# Patient Record
Sex: MALE | Race: WHITE | Employment: OTHER | URBAN - NONMETROPOLITAN AREA
[De-identification: names, ages, dates, MRNs, and addresses within clinical notes are randomized per-mention and may not be internally consistent; named-entity substitution may affect disease eponyms.]

---

## 2017-01-03 RX ORDER — HYDROCHLOROTHIAZIDE 25 MG/1
TABLET ORAL
Qty: 30 TABLET | Refills: 4 | Status: SHIPPED | OUTPATIENT
Start: 2017-01-03 | End: 2017-11-01 | Stop reason: SDUPTHER

## 2017-01-18 ENCOUNTER — OFFICE VISIT (OUTPATIENT)
Dept: FAMILY MEDICINE CLINIC | Age: 62
End: 2017-01-18

## 2017-01-18 VITALS
HEART RATE: 68 BPM | HEIGHT: 70 IN | SYSTOLIC BLOOD PRESSURE: 122 MMHG | DIASTOLIC BLOOD PRESSURE: 70 MMHG | WEIGHT: 192 LBS | BODY MASS INDEX: 27.49 KG/M2 | OXYGEN SATURATION: 98 %

## 2017-01-18 DIAGNOSIS — I10 ESSENTIAL HYPERTENSION: Primary | ICD-10-CM

## 2017-01-18 PROCEDURE — G8482 FLU IMMUNIZE ORDER/ADMIN: HCPCS | Performed by: NURSE PRACTITIONER

## 2017-01-18 PROCEDURE — G8419 CALC BMI OUT NRM PARAM NOF/U: HCPCS | Performed by: NURSE PRACTITIONER

## 2017-01-18 PROCEDURE — G8427 DOCREV CUR MEDS BY ELIG CLIN: HCPCS | Performed by: NURSE PRACTITIONER

## 2017-01-18 PROCEDURE — 99214 OFFICE O/P EST MOD 30 MIN: CPT | Performed by: NURSE PRACTITIONER

## 2017-01-18 PROCEDURE — 3017F COLORECTAL CA SCREEN DOC REV: CPT | Performed by: NURSE PRACTITIONER

## 2017-01-18 PROCEDURE — 4004F PT TOBACCO SCREEN RCVD TLK: CPT | Performed by: NURSE PRACTITIONER

## 2017-01-18 ASSESSMENT — ENCOUNTER SYMPTOMS
RESPIRATORY NEGATIVE: 1
EYES NEGATIVE: 1
ALLERGIC/IMMUNOLOGIC NEGATIVE: 1

## 2017-03-27 ENCOUNTER — OFFICE VISIT (OUTPATIENT)
Dept: FAMILY MEDICINE CLINIC | Age: 62
End: 2017-03-27

## 2017-03-27 VITALS
BODY MASS INDEX: 26.45 KG/M2 | SYSTOLIC BLOOD PRESSURE: 126 MMHG | DIASTOLIC BLOOD PRESSURE: 84 MMHG | HEIGHT: 70 IN | WEIGHT: 184.8 LBS | TEMPERATURE: 98.6 F | OXYGEN SATURATION: 95 % | HEART RATE: 96 BPM

## 2017-03-27 DIAGNOSIS — J40 BRONCHITIS: Primary | ICD-10-CM

## 2017-03-27 PROCEDURE — 99214 OFFICE O/P EST MOD 30 MIN: CPT | Performed by: FAMILY MEDICINE

## 2017-03-27 RX ORDER — CEFDINIR 300 MG/1
300 CAPSULE ORAL 2 TIMES DAILY
Qty: 20 CAPSULE | Refills: 0 | Status: SHIPPED | OUTPATIENT
Start: 2017-03-27 | End: 2017-04-06

## 2017-03-27 RX ORDER — DEXTROMETHORPHAN HYDROBROMIDE AND PROMETHAZINE HYDROCHLORIDE 15; 6.25 MG/5ML; MG/5ML
5 SYRUP ORAL 4 TIMES DAILY PRN
Qty: 120 ML | Refills: 0 | Status: SHIPPED | OUTPATIENT
Start: 2017-03-27 | End: 2017-04-03

## 2017-03-27 ASSESSMENT — ENCOUNTER SYMPTOMS
SHORTNESS OF BREATH: 1
VOMITING: 0
DIARRHEA: 0
NAUSEA: 0
COUGH: 1
CONSTIPATION: 0

## 2017-03-31 DIAGNOSIS — I10 ESSENTIAL HYPERTENSION: ICD-10-CM

## 2017-03-31 RX ORDER — FLUOXETINE HYDROCHLORIDE 40 MG/1
CAPSULE ORAL
Qty: 60 CAPSULE | Refills: 5 | Status: SHIPPED | OUTPATIENT
Start: 2017-03-31 | End: 2017-09-27 | Stop reason: SDUPTHER

## 2017-03-31 RX ORDER — LISINOPRIL 10 MG/1
TABLET ORAL
Qty: 30 TABLET | Refills: 3 | Status: SHIPPED | OUTPATIENT
Start: 2017-03-31 | End: 2017-07-31 | Stop reason: SDUPTHER

## 2017-04-18 ENCOUNTER — OFFICE VISIT (OUTPATIENT)
Dept: FAMILY MEDICINE CLINIC | Age: 62
End: 2017-04-18

## 2017-04-18 VITALS
SYSTOLIC BLOOD PRESSURE: 102 MMHG | DIASTOLIC BLOOD PRESSURE: 68 MMHG | HEIGHT: 70 IN | BODY MASS INDEX: 27.2 KG/M2 | OXYGEN SATURATION: 96 % | WEIGHT: 190 LBS | HEART RATE: 89 BPM

## 2017-04-18 DIAGNOSIS — R10.30 LOWER ABDOMINAL PAIN: ICD-10-CM

## 2017-04-18 DIAGNOSIS — R30.0 DYSURIA: Primary | ICD-10-CM

## 2017-04-18 DIAGNOSIS — N41.0 ACUTE PROSTATITIS: ICD-10-CM

## 2017-04-18 LAB
BILIRUBIN, POC: NEGATIVE
BLOOD URINE, POC: NEGATIVE
CLARITY, POC: CLEAR
COLOR, POC: YELLOW
GLUCOSE URINE, POC: NEGATIVE
KETONES, POC: NORMAL
LEUKOCYTE EST, POC: NEGATIVE
NITRITE, POC: NEGATIVE
PH, POC: 6
PROTEIN, POC: NORMAL
SPECIFIC GRAVITY, POC: 1.02
UROBILINOGEN, POC: 0.2

## 2017-04-18 PROCEDURE — 81002 URINALYSIS NONAUTO W/O SCOPE: CPT | Performed by: FAMILY MEDICINE

## 2017-04-18 PROCEDURE — 99214 OFFICE O/P EST MOD 30 MIN: CPT | Performed by: FAMILY MEDICINE

## 2017-04-18 RX ORDER — CIPROFLOXACIN 500 MG/1
500 TABLET, FILM COATED ORAL 2 TIMES DAILY
Qty: 28 TABLET | Refills: 0 | Status: SHIPPED | OUTPATIENT
Start: 2017-04-18 | End: 2017-05-02

## 2017-04-18 RX ORDER — IBUPROFEN 800 MG/1
800 TABLET ORAL EVERY 8 HOURS PRN
Qty: 60 TABLET | Refills: 0 | Status: SHIPPED | OUTPATIENT
Start: 2017-04-18 | End: 2019-06-19

## 2017-04-18 ASSESSMENT — ENCOUNTER SYMPTOMS
ABDOMINAL PAIN: 1
NAUSEA: 1

## 2017-04-20 LAB — URINE CULTURE, ROUTINE: NORMAL

## 2017-04-27 RX ORDER — SIMVASTATIN 10 MG
TABLET ORAL
Qty: 30 TABLET | Refills: 5 | Status: SHIPPED | OUTPATIENT
Start: 2017-04-27 | End: 2017-08-30 | Stop reason: SDUPTHER

## 2017-05-23 ENCOUNTER — OFFICE VISIT (OUTPATIENT)
Dept: FAMILY MEDICINE CLINIC | Age: 62
End: 2017-05-23

## 2017-05-23 VITALS
BODY MASS INDEX: 27.35 KG/M2 | OXYGEN SATURATION: 98 % | HEART RATE: 87 BPM | DIASTOLIC BLOOD PRESSURE: 68 MMHG | HEIGHT: 70 IN | SYSTOLIC BLOOD PRESSURE: 102 MMHG | WEIGHT: 191 LBS

## 2017-05-23 DIAGNOSIS — R59.0 AXILLARY ADENOPATHY: Primary | ICD-10-CM

## 2017-05-23 PROCEDURE — 99213 OFFICE O/P EST LOW 20 MIN: CPT | Performed by: NURSE PRACTITIONER

## 2017-05-23 ASSESSMENT — ENCOUNTER SYMPTOMS: RESPIRATORY NEGATIVE: 1

## 2017-06-13 RX ORDER — METOPROLOL SUCCINATE 25 MG/1
TABLET, EXTENDED RELEASE ORAL
Qty: 30 TABLET | Refills: 5 | Status: SHIPPED | OUTPATIENT
Start: 2017-06-13 | End: 2018-06-28 | Stop reason: SDUPTHER

## 2017-06-14 RX ORDER — METOPROLOL SUCCINATE 25 MG/1
TABLET, EXTENDED RELEASE ORAL
Qty: 30 TABLET | Refills: 5 | Status: SHIPPED | OUTPATIENT
Start: 2017-06-14 | End: 2017-08-30 | Stop reason: SDUPTHER

## 2017-07-31 DIAGNOSIS — I10 ESSENTIAL HYPERTENSION: ICD-10-CM

## 2017-07-31 RX ORDER — LISINOPRIL 10 MG/1
TABLET ORAL
Qty: 30 TABLET | Refills: 3 | Status: SHIPPED | OUTPATIENT
Start: 2017-07-31 | End: 2017-11-29 | Stop reason: SDUPTHER

## 2017-08-25 RX ORDER — SIMVASTATIN 10 MG
TABLET ORAL
Qty: 90 TABLET | Refills: 3 | Status: SHIPPED | OUTPATIENT
Start: 2017-08-25 | End: 2018-10-29 | Stop reason: DRUGHIGH

## 2017-08-30 ENCOUNTER — OFFICE VISIT (OUTPATIENT)
Dept: FAMILY MEDICINE CLINIC | Age: 62
End: 2017-08-30

## 2017-08-30 VITALS
SYSTOLIC BLOOD PRESSURE: 124 MMHG | HEIGHT: 70 IN | HEART RATE: 81 BPM | DIASTOLIC BLOOD PRESSURE: 86 MMHG | BODY MASS INDEX: 27.06 KG/M2 | WEIGHT: 189 LBS | TEMPERATURE: 98.3 F | OXYGEN SATURATION: 97 %

## 2017-08-30 DIAGNOSIS — R73.09 ELEVATED GLUCOSE: ICD-10-CM

## 2017-08-30 DIAGNOSIS — Z11.4 SCREENING FOR HIV WITHOUT PRESENCE OF RISK FACTORS: ICD-10-CM

## 2017-08-30 DIAGNOSIS — E78.2 MIXED HYPERLIPIDEMIA: ICD-10-CM

## 2017-08-30 DIAGNOSIS — Z23 NEEDS FLU SHOT: ICD-10-CM

## 2017-08-30 DIAGNOSIS — R55 NEAR SYNCOPE: Primary | ICD-10-CM

## 2017-08-30 DIAGNOSIS — I10 ESSENTIAL HYPERTENSION: ICD-10-CM

## 2017-08-30 PROCEDURE — 90471 IMMUNIZATION ADMIN: CPT | Performed by: NURSE PRACTITIONER

## 2017-08-30 PROCEDURE — 90688 IIV4 VACCINE SPLT 0.5 ML IM: CPT | Performed by: NURSE PRACTITIONER

## 2017-08-30 PROCEDURE — 99213 OFFICE O/P EST LOW 20 MIN: CPT | Performed by: NURSE PRACTITIONER

## 2017-08-30 ASSESSMENT — ENCOUNTER SYMPTOMS
RESPIRATORY NEGATIVE: 1
ALLERGIC/IMMUNOLOGIC NEGATIVE: 1
EYES NEGATIVE: 1
GASTROINTESTINAL NEGATIVE: 1

## 2017-08-31 ENCOUNTER — NURSE ONLY (OUTPATIENT)
Dept: FAMILY MEDICINE CLINIC | Age: 62
End: 2017-08-31

## 2017-08-31 DIAGNOSIS — I10 ESSENTIAL HYPERTENSION: ICD-10-CM

## 2017-08-31 DIAGNOSIS — E78.2 MIXED HYPERLIPIDEMIA: ICD-10-CM

## 2017-08-31 DIAGNOSIS — R73.09 ELEVATED GLUCOSE: ICD-10-CM

## 2017-08-31 DIAGNOSIS — Z11.4 SCREENING FOR HIV WITHOUT PRESENCE OF RISK FACTORS: ICD-10-CM

## 2017-08-31 LAB
A/G RATIO: 1.6 (ref 1.1–2.2)
ALBUMIN SERPL-MCNC: 4.6 G/DL (ref 3.4–5)
ALP BLD-CCNC: 46 U/L (ref 40–129)
ALT SERPL-CCNC: 49 U/L (ref 10–40)
ANION GAP SERPL CALCULATED.3IONS-SCNC: 17 MMOL/L (ref 3–16)
AST SERPL-CCNC: 50 U/L (ref 15–37)
BILIRUB SERPL-MCNC: 0.8 MG/DL (ref 0–1)
BUN BLDV-MCNC: 17 MG/DL (ref 7–20)
CALCIUM SERPL-MCNC: 9.5 MG/DL (ref 8.3–10.6)
CHLORIDE BLD-SCNC: 98 MMOL/L (ref 99–110)
CHOLESTEROL, TOTAL: 226 MG/DL (ref 0–199)
CO2: 23 MMOL/L (ref 21–32)
CREAT SERPL-MCNC: 0.8 MG/DL (ref 0.8–1.3)
GFR AFRICAN AMERICAN: >60
GFR NON-AFRICAN AMERICAN: >60
GLOBULIN: 2.9 G/DL
GLUCOSE BLD-MCNC: 101 MG/DL (ref 70–99)
HDLC SERPL-MCNC: 50 MG/DL (ref 40–60)
LDL CHOLESTEROL CALCULATED: 134 MG/DL
POTASSIUM SERPL-SCNC: 4.5 MMOL/L (ref 3.5–5.1)
SODIUM BLD-SCNC: 138 MMOL/L (ref 136–145)
TOTAL PROTEIN: 7.5 G/DL (ref 6.4–8.2)
TRIGL SERPL-MCNC: 210 MG/DL (ref 0–150)
VLDLC SERPL CALC-MCNC: 42 MG/DL

## 2017-08-31 PROCEDURE — 36415 COLL VENOUS BLD VENIPUNCTURE: CPT | Performed by: NURSE PRACTITIONER

## 2017-09-01 LAB
ESTIMATED AVERAGE GLUCOSE: 102.5 MG/DL
HBA1C MFR BLD: 5.2 %
HIV-1 AND HIV-2 ANTIBODIES: NORMAL

## 2017-09-27 RX ORDER — FLUOXETINE HYDROCHLORIDE 40 MG/1
CAPSULE ORAL
Qty: 60 CAPSULE | Refills: 5 | Status: SHIPPED | OUTPATIENT
Start: 2017-09-27 | End: 2018-04-04 | Stop reason: SDUPTHER

## 2017-11-01 RX ORDER — HYDROCHLOROTHIAZIDE 25 MG/1
TABLET ORAL
Qty: 30 TABLET | Refills: 4 | Status: SHIPPED | OUTPATIENT
Start: 2017-11-01 | End: 2018-08-27 | Stop reason: SDUPTHER

## 2018-04-04 RX ORDER — FLUOXETINE HYDROCHLORIDE 40 MG/1
CAPSULE ORAL
Qty: 60 CAPSULE | Refills: 5 | Status: SHIPPED | OUTPATIENT
Start: 2018-04-04 | End: 2018-06-26 | Stop reason: SDUPTHER

## 2018-05-03 ENCOUNTER — OFFICE VISIT (OUTPATIENT)
Dept: FAMILY MEDICINE CLINIC | Age: 63
End: 2018-05-03

## 2018-05-03 VITALS
HEIGHT: 70 IN | OXYGEN SATURATION: 94 % | DIASTOLIC BLOOD PRESSURE: 74 MMHG | WEIGHT: 192 LBS | BODY MASS INDEX: 27.49 KG/M2 | HEART RATE: 90 BPM | TEMPERATURE: 98.1 F | SYSTOLIC BLOOD PRESSURE: 126 MMHG

## 2018-05-03 DIAGNOSIS — R68.89 FLU-LIKE SYMPTOMS: ICD-10-CM

## 2018-05-03 DIAGNOSIS — J18.9 PNEUMONIA OF LEFT LOWER LOBE DUE TO INFECTIOUS ORGANISM: Primary | ICD-10-CM

## 2018-05-03 LAB
INFLUENZA A ANTIGEN, POC: NORMAL
INFLUENZA B ANTIGEN, POC: NORMAL

## 2018-05-03 PROCEDURE — 99213 OFFICE O/P EST LOW 20 MIN: CPT | Performed by: NURSE PRACTITIONER

## 2018-05-03 PROCEDURE — 87804 INFLUENZA ASSAY W/OPTIC: CPT | Performed by: NURSE PRACTITIONER

## 2018-05-03 RX ORDER — ALBUTEROL SULFATE 90 UG/1
2 AEROSOL, METERED RESPIRATORY (INHALATION) EVERY 6 HOURS PRN
Qty: 1 INHALER | Refills: 3 | Status: SHIPPED | OUTPATIENT
Start: 2018-05-03 | End: 2019-06-19

## 2018-05-03 RX ORDER — LEVOFLOXACIN 500 MG/1
500 TABLET, FILM COATED ORAL DAILY
Qty: 5 TABLET | Refills: 0 | Status: SHIPPED | OUTPATIENT
Start: 2018-05-03 | End: 2018-05-08

## 2018-05-03 ASSESSMENT — ENCOUNTER SYMPTOMS
NAUSEA: 1
EYES NEGATIVE: 1
VOMITING: 0
SHORTNESS OF BREATH: 1
DIARRHEA: 0
WHEEZING: 1
COUGH: 1

## 2018-05-27 DIAGNOSIS — I10 ESSENTIAL HYPERTENSION: ICD-10-CM

## 2018-05-29 RX ORDER — LISINOPRIL 10 MG/1
TABLET ORAL
Qty: 30 TABLET | Refills: 5 | Status: SHIPPED | OUTPATIENT
Start: 2018-05-29 | End: 2018-11-28 | Stop reason: SDUPTHER

## 2018-06-20 ENCOUNTER — TELEPHONE (OUTPATIENT)
Dept: FAMILY MEDICINE CLINIC | Age: 63
End: 2018-06-20

## 2018-06-21 RX ORDER — POTASSIUM CHLORIDE 750 MG/1
10 TABLET, FILM COATED, EXTENDED RELEASE ORAL DAILY
Qty: 90 TABLET | Refills: 3 | Status: SHIPPED | OUTPATIENT
Start: 2018-06-21 | End: 2019-07-23 | Stop reason: SDUPTHER

## 2018-06-28 RX ORDER — METOPROLOL SUCCINATE 25 MG/1
TABLET, EXTENDED RELEASE ORAL
Qty: 30 TABLET | Refills: 5 | Status: SHIPPED | OUTPATIENT
Start: 2018-06-28 | End: 2019-01-15 | Stop reason: SDUPTHER

## 2018-07-26 RX ORDER — SIMVASTATIN 10 MG
TABLET ORAL
Qty: 30 TABLET | Refills: 0 | Status: SHIPPED | OUTPATIENT
Start: 2018-07-26 | End: 2018-08-31 | Stop reason: SDUPTHER

## 2018-08-14 ENCOUNTER — OFFICE VISIT (OUTPATIENT)
Dept: FAMILY MEDICINE CLINIC | Age: 63
End: 2018-08-14

## 2018-08-14 VITALS
DIASTOLIC BLOOD PRESSURE: 96 MMHG | SYSTOLIC BLOOD PRESSURE: 156 MMHG | OXYGEN SATURATION: 98 % | HEART RATE: 93 BPM | WEIGHT: 196.2 LBS | BODY MASS INDEX: 28.15 KG/M2

## 2018-08-14 DIAGNOSIS — R53.83 FATIGUE, UNSPECIFIED TYPE: Primary | ICD-10-CM

## 2018-08-14 DIAGNOSIS — R00.2 PALPITATIONS: ICD-10-CM

## 2018-08-14 DIAGNOSIS — R42 DIZZINESS: ICD-10-CM

## 2018-08-14 PROCEDURE — 3017F COLORECTAL CA SCREEN DOC REV: CPT | Performed by: NURSE PRACTITIONER

## 2018-08-14 PROCEDURE — 99213 OFFICE O/P EST LOW 20 MIN: CPT | Performed by: NURSE PRACTITIONER

## 2018-08-14 PROCEDURE — G8419 CALC BMI OUT NRM PARAM NOF/U: HCPCS | Performed by: NURSE PRACTITIONER

## 2018-08-14 PROCEDURE — 93000 ELECTROCARDIOGRAM COMPLETE: CPT | Performed by: NURSE PRACTITIONER

## 2018-08-14 PROCEDURE — 4004F PT TOBACCO SCREEN RCVD TLK: CPT | Performed by: NURSE PRACTITIONER

## 2018-08-14 PROCEDURE — G8427 DOCREV CUR MEDS BY ELIG CLIN: HCPCS | Performed by: NURSE PRACTITIONER

## 2018-08-14 PROCEDURE — 36415 COLL VENOUS BLD VENIPUNCTURE: CPT | Performed by: NURSE PRACTITIONER

## 2018-08-14 ASSESSMENT — ENCOUNTER SYMPTOMS
RESPIRATORY NEGATIVE: 1
EYES NEGATIVE: 1
GASTROINTESTINAL NEGATIVE: 1

## 2018-08-14 ASSESSMENT — PATIENT HEALTH QUESTIONNAIRE - PHQ9
SUM OF ALL RESPONSES TO PHQ QUESTIONS 1-9: 0
2. FEELING DOWN, DEPRESSED OR HOPELESS: 0
1. LITTLE INTEREST OR PLEASURE IN DOING THINGS: 0
SUM OF ALL RESPONSES TO PHQ QUESTIONS 1-9: 0
SUM OF ALL RESPONSES TO PHQ9 QUESTIONS 1 & 2: 0

## 2018-08-14 NOTE — PROGRESS NOTES
normal.   Neck: Neck supple. Cardiovascular: Normal rate, regular rhythm, S1 normal, S2 normal, normal heart sounds, intact distal pulses and normal pulses. Exam reveals no gallop. No murmur heard. Pulmonary/Chest: Effort normal and breath sounds normal. No accessory muscle usage. No respiratory distress. Abdominal: Soft. Bowel sounds are normal.   Neurological: He is alert and oriented to person, place, and time. Skin: Skin is warm and dry. No rash noted. Psychiatric: He has a normal mood and affect. His speech is normal and behavior is normal.       Assessment/Plan:   1. Fatigue, unspecified type  Patient presents today with c/o dizziness, fatigue, palpitations and intermittent chest pain over the past 6 months. Patient also reports his blood has fluctuated. Blood pressure is elevated today however patient/spouse reports he has been out of metoprolol for 4-5 days. Patient also reports he just took morning medications just prior to visit. Patient with no history of heart disease however strong family history of heart disease in first degree family member <48years of age. EKG shows NSR today. Discussed other possible causes including CAD. Recommend labs as below and patient f/u with cardiology. I also advised patient that should he develop any further chest pain, I would recommend patient seek emergent care. Patient/spouse verbalized understanding. Also see patient instructions. - Comprehensive Metabolic Panel  - TSH with Reflex  - CBC Auto Differential  - Vitamin B12  - Nirali Paul MD    2. Palpitations  See note above. - EKG 12 Lead  - Nirali Paul MD    3.  Dizziness  - Comprehensive Metabolic Panel  - EKG 12 Lead  - Nirali Paul MD

## 2018-08-14 NOTE — PATIENT INSTRUCTIONS
dizziness. Some people feel dizzy when they have migraine headaches. Sometimes bouts of flu can make you feel dizzy. Some medical conditions, such as heart problems or high blood pressure, can make you feel dizzy. Many medicines can cause dizziness, including medicines for high blood pressure, pain, or anxiety. If a medicine causes your symptoms, your doctor may recommend that you stop or change the medicine. If it is a problem with your heart, you may need medicine to help your heart work better. If there is no clear reason for your symptoms, your doctor may suggest watching and waiting for a while to see if the dizziness goes away on its own. Follow-up care is a key part of your treatment and safety. Be sure to make and go to all appointments, and call your doctor if you are having problems. It's also a good idea to know your test results and keep a list of the medicines you take. How can you care for yourself at home? · If your doctor recommends or prescribes medicine, take it exactly as directed. Call your doctor if you think you are having a problem with your medicine. · Do not drive while you feel dizzy. · Try to prevent falls. Steps you can take include:  ¨ Using nonskid mats, adding grab bars near the tub, and using night-lights. ¨ Clearing your home so that walkways are free of anything you might trip on. ¨ Letting family and friends know that you have been feeling dizzy. This will help them know how to help you. When should you call for help? Call 911 anytime you think you may need emergency care. For example, call if:    · You passed out (lost consciousness).     · You have dizziness along with symptoms of a heart attack. These may include:  ¨ Chest pain or pressure, or a strange feeling in the chest.  ¨ Sweating. ¨ Shortness of breath. ¨ Nausea or vomiting. ¨ Pain, pressure, or a strange feeling in the back, neck, jaw, or upper belly or in one or both shoulders or arms.   ¨ Lightheadedness or sudden weakness. ¨ A fast or irregular heartbeat.     · You have symptoms of a stroke. These may include:  ¨ Sudden numbness, tingling, weakness, or loss of movement in your face, arm, or leg, especially on only one side of your body. ¨ Sudden vision changes. ¨ Sudden trouble speaking. ¨ Sudden confusion or trouble understanding simple statements. ¨ Sudden problems with walking or balance. ¨ A sudden, severe headache that is different from past headaches.    Call your doctor now or seek immediate medical care if:    · You feel dizzy and have a fever, headache, or ringing in your ears.     · You have new or increased nausea and vomiting.     · Your dizziness does not go away or comes back.    Watch closely for changes in your health, and be sure to contact your doctor if:    · You do not get better as expected. Where can you learn more? Go to https://[email or web address].[email or web address]. org and sign in to your [employer] account. Enter A250 in the Thename.is box to learn more about \"Dizziness: Care Instructions. \"     If you do not have an account, please click on the \"Sign Up Now\" link. Current as of: November 20, 2017  Content Version: 11.7  © 3265-6230 [employer]. Care instructions adapted under license by [facility] ([facility]). If you have questions about a medical condition or this instruction, always ask your healthcare professional. [address] any warranty or liability for your use of this information.

## 2018-08-15 LAB
A/G RATIO: 1.9 (ref 1.1–2.2)
ALBUMIN SERPL-MCNC: 4.7 G/DL (ref 3.4–5)
ALP BLD-CCNC: 45 U/L (ref 40–129)
ALT SERPL-CCNC: 42 U/L (ref 10–40)
ANION GAP SERPL CALCULATED.3IONS-SCNC: 15 MMOL/L (ref 3–16)
AST SERPL-CCNC: 44 U/L (ref 15–37)
BASOPHILS ABSOLUTE: 0 K/UL (ref 0–0.2)
BASOPHILS RELATIVE PERCENT: 0.5 %
BILIRUB SERPL-MCNC: 0.5 MG/DL (ref 0–1)
BUN BLDV-MCNC: 15 MG/DL (ref 7–20)
CALCIUM SERPL-MCNC: 9.5 MG/DL (ref 8.3–10.6)
CHLORIDE BLD-SCNC: 100 MMOL/L (ref 99–110)
CO2: 23 MMOL/L (ref 21–32)
CREAT SERPL-MCNC: 0.8 MG/DL (ref 0.8–1.3)
EOSINOPHILS ABSOLUTE: 0.2 K/UL (ref 0–0.6)
EOSINOPHILS RELATIVE PERCENT: 4.8 %
GFR AFRICAN AMERICAN: >60
GFR NON-AFRICAN AMERICAN: >60
GLOBULIN: 2.5 G/DL
GLUCOSE BLD-MCNC: 100 MG/DL (ref 70–99)
HCT VFR BLD CALC: 39.8 % (ref 40.5–52.5)
HEMOGLOBIN: 13.4 G/DL (ref 13.5–17.5)
LYMPHOCYTES ABSOLUTE: 1.2 K/UL (ref 1–5.1)
LYMPHOCYTES RELATIVE PERCENT: 32.5 %
MCH RBC QN AUTO: 35 PG (ref 26–34)
MCHC RBC AUTO-ENTMCNC: 33.7 G/DL (ref 31–36)
MCV RBC AUTO: 104 FL (ref 80–100)
MONOCYTES ABSOLUTE: 0.4 K/UL (ref 0–1.3)
MONOCYTES RELATIVE PERCENT: 11.2 %
NEUTROPHILS ABSOLUTE: 1.9 K/UL (ref 1.7–7.7)
NEUTROPHILS RELATIVE PERCENT: 51 %
PDW BLD-RTO: 13.4 % (ref 12.4–15.4)
PLATELET # BLD: 200 K/UL (ref 135–450)
PLATELET SLIDE REVIEW: ADEQUATE
PMV BLD AUTO: 8.8 FL (ref 5–10.5)
POTASSIUM SERPL-SCNC: 4.1 MMOL/L (ref 3.5–5.1)
RBC # BLD: 3.83 M/UL (ref 4.2–5.9)
RBC # BLD: NORMAL 10*6/UL
SLIDE REVIEW: ABNORMAL
SODIUM BLD-SCNC: 138 MMOL/L (ref 136–145)
TOTAL PROTEIN: 7.2 G/DL (ref 6.4–8.2)
TSH REFLEX: 1.7 UIU/ML (ref 0.27–4.2)
VITAMIN B-12: 333 PG/ML (ref 211–911)
WBC # BLD: 3.8 K/UL (ref 4–11)

## 2018-08-20 ENCOUNTER — TELEPHONE (OUTPATIENT)
Dept: CARDIOLOGY CLINIC | Age: 63
End: 2018-08-20

## 2018-08-20 NOTE — TELEPHONE ENCOUNTER
----- Message from Juan Gupta MD sent at 8/20/2018  8:27 AM EDT -----  Normal EKG upon my review. They want referral and please make sure he will have appt. Thanks.

## 2018-08-28 RX ORDER — HYDROCHLOROTHIAZIDE 25 MG/1
TABLET ORAL
Qty: 30 TABLET | Refills: 4 | Status: SHIPPED | OUTPATIENT
Start: 2018-08-28 | End: 2019-01-15 | Stop reason: SDUPTHER

## 2018-08-31 RX ORDER — SIMVASTATIN 10 MG
TABLET ORAL
Qty: 30 TABLET | Refills: 5 | Status: SHIPPED | OUTPATIENT
Start: 2018-08-31 | End: 2018-10-01 | Stop reason: SDUPTHER

## 2018-09-27 PROBLEM — R53.82 CHRONIC FATIGUE: Status: ACTIVE | Noted: 2018-09-27

## 2018-09-27 PROBLEM — R42 DIZZINESS: Status: ACTIVE | Noted: 2018-09-27

## 2018-10-01 ENCOUNTER — OFFICE VISIT (OUTPATIENT)
Dept: CARDIOLOGY CLINIC | Age: 63
End: 2018-10-01
Payer: MEDICARE

## 2018-10-01 VITALS
WEIGHT: 197 LBS | OXYGEN SATURATION: 96 % | HEART RATE: 74 BPM | HEIGHT: 70 IN | SYSTOLIC BLOOD PRESSURE: 164 MMHG | DIASTOLIC BLOOD PRESSURE: 104 MMHG | BODY MASS INDEX: 28.2 KG/M2

## 2018-10-01 DIAGNOSIS — R07.2 PRECORDIAL PAIN: ICD-10-CM

## 2018-10-01 DIAGNOSIS — R42 DIZZINESS: ICD-10-CM

## 2018-10-01 DIAGNOSIS — R00.2 PALPITATIONS: Primary | ICD-10-CM

## 2018-10-01 DIAGNOSIS — I10 ESSENTIAL HYPERTENSION: ICD-10-CM

## 2018-10-01 DIAGNOSIS — E78.2 MIXED HYPERLIPIDEMIA: ICD-10-CM

## 2018-10-01 PROCEDURE — G8419 CALC BMI OUT NRM PARAM NOF/U: HCPCS | Performed by: INTERNAL MEDICINE

## 2018-10-01 PROCEDURE — 3017F COLORECTAL CA SCREEN DOC REV: CPT | Performed by: INTERNAL MEDICINE

## 2018-10-01 PROCEDURE — G8484 FLU IMMUNIZE NO ADMIN: HCPCS | Performed by: INTERNAL MEDICINE

## 2018-10-01 PROCEDURE — 99204 OFFICE O/P NEW MOD 45 MIN: CPT | Performed by: INTERNAL MEDICINE

## 2018-10-01 PROCEDURE — G8427 DOCREV CUR MEDS BY ELIG CLIN: HCPCS | Performed by: INTERNAL MEDICINE

## 2018-10-01 RX ORDER — SILDENAFIL CITRATE 20 MG/1
20 TABLET ORAL PRN
COMMUNITY
End: 2019-09-20

## 2018-10-01 NOTE — PROGRESS NOTES
[address]   Cardiac Consultation    Referring Provider:  SATISH Thakur CNP     Chief Complaint   Patient presents with   [facility] Cardiologist     referred by PCP, ekg 08/14/2018    Palpitations    Dizziness    Fatigue    Discuss Labs     cmp 08/14/2018 & lip 2015      Subjective  History of Present Illness:   Mr Botello Ear 66 yr old male here as new patient referred by NP Ephraim Grant for c/o palpitations, SOB, and CP. He has PMH HTN, HLD, GERD, rheumatoid arthritis, and enlarged prostate. His most recent EKG 8/14/18 showed NSR. Note remote [facility] 2004 showed normal coronaries. Today he reports for last 4 months having random episodes of palpitations that feel like his heart is racing/skipping in his chest.  No associated symptoms and nothing triggers the event. Spells last about 20 minutes with no associated symptoms and resolve spontaneously. On average 2 episodes per week. No palps for past week. He also reports for past 4 yrs with standing after sitting he will feel dizzy and lightheaded. He has passed out twice in past 6 months which is new for him. He rememebers last episode getting out of car to urinate and woke up with wife picking him up off road. At times reports vague chest pains not related to anything in particular. He does get SOB when walking longer distances which is newer finding. Note +premature CAD family history with father dying of MI at age 46 and sister with CABG at 70yo. Patient with no complaints of  SOB,  edema, or orthopnea/PND. Veleta Gift Past Medical History:   has a past medical history of Acid reflux; Arthritis; Depression; Enlarged prostate; Hearing decreased; Hyperlipidemia; Hypertension; and Muscle pain. Surgical History:   has a past surgical history that includes Appendectomy; Wrist surgery; other surgical history (09/27/2012); Cardiac catheterization; Cystocopy; Colonoscopy (7/25/2013); and Upper gastrointestinal endoscopy (7/25/2013).      Social History:   Retired from [employer], , 2 grown children lives with wife in [place], [place]  He reports that he quit smoking about 25 years ago after smoking 15 yrs 5ppd. . He has a 30.00 pack-year smoking history. His smokeless tobacco use includes Chew. He reports that he drinks about 6 pack per alcohol per day . He reports that he uses drugs, including Marijuana several times per week. Family History:  Da MI  age 46, Brother CHF Sister CABG age 72  family history includes Cancer in his mother; Diabetes in his mother; Heart Disease in his father; High Blood Pressure in his father; Stroke in his mother. Home Medications:  Prior to Admission medications    Medication Sig Start Date End Date Taking? Authorizing Provider   sildenafil (REVATIO) 20 MG tablet Take 20 mg by mouth as needed   Yes Historical Provider, MD   simvastatin (ZOCOR) 10 MG tablet TAKE 1 TABLET BY MOUTH EVERY EVENING. 18  Yes SATISH Meek CNP   hydrochlorothiazide (HYDRODIURIL) 25 MG tablet TAKE 1/2 TABLET BY MOUTH DAILY.  18  Yes SATISH Meek CNP   metoprolol succinate (TOPROL XL) 25 MG extended release tablet TAKE 1 TABLET BY MOUTH DAILY 18  Yes Alfreda Motta MD   FLUoxetine (PROZAC) 40 MG capsule TAKE ONE CAPSULE BY MOUTH TWICE A DAY 18  Yes Alfreda Motta MD   potassium chloride (KLOR-CON 10) 10 MEQ extended release tablet Take 1 tablet by mouth daily 18  Yes Alfreda Motta MD   lisinopril (PRINIVIL;ZESTRIL) 10 MG tablet TAKE 1 TABLET BY MOUTH DAILY 18  Yes Alfreda Motta MD   albuterol sulfate HFA (VENTOLIN HFA) 108 (90 Base) MCG/ACT inhaler Inhale 2 puffs into the lungs every 6 hours as needed for Wheezing 5/3/18  Yes SATISH Meek CNP   simvastatin (ZOCOR) 10 MG tablet TAKE 1 TABLET BY MOUTH EVERY EVENING. 17  Yes SATISH Meek CNP   ibuprofen (ADVIL;MOTRIN) 800 MG tablet Take 1 tablet by mouth every 8 hours as needed for Pain

## 2018-10-01 NOTE — COMMUNICATION BODY
4/18/17  Yes Ta Eubanks MD   sulindac (CLINORIL) 150 MG tablet TAKE 1 TABLET BY MOUTH 2 TIMES DAILY. 11/28/16  Yes Historical Provider, MD   FIBER COMPLETE PO Take by mouth   Yes Historical Provider, MD   terazosin (HYTRIN) 5 MG capsule TAKE 1 CAPSULE BY MOUTH NIGHTLY. 11/28/16  Yes Ta Eubanks MD   Tocilizumab (ACTEMRA IV) Infuse  intravenously. Yes Historical Provider, MD   aspirin 81 MG chewable tablet Take 81 mg by mouth daily. Yes Historical Provider, MD   omeprazole (PRILOSEC) 20 MG capsule Take 20 mg by mouth Daily    Yes Historical Provider, MD        Allergies:  Codeine and Pcn [penicillins]     Review of Systems:   · Constitutional: there has been no unanticipated weight loss. There's been no change in energy level, sleep pattern, or activity level. · Eyes: No visual changes or diplopia. No scleral icterus. · ENT: No Headaches, hearing loss or vertigo. No mouth sores or sore throat. · Cardiovascular: Reviewed in HPI  · Respiratory: No cough or wheezing, no sputum production. No hematemesis. · Gastrointestinal: No abdominal pain, appetite loss, blood in stools. No change in bowel or bladder habits. · Genitourinary: No dysuria, trouble voiding, or hematuria. · Musculoskeletal:  No gait disturbance, weakness or joint complaints. · Integumentary: No rash or pruritis. · Neurological: No headache, diplopia, change in muscle strength, numbness or tingling. No change in gait, balance, coordination, mood, affect, memory, mentation, behavior. · Psychiatric: No anxiety, no depression. · Endocrine: No malaise, fatigue or temperature intolerance. No excessive thirst, fluid intake, or urination. No tremor. · Hematologic/Lymphatic: No abnormal bruising or bleeding, blood clots or swollen lymph nodes. · Allergic/Immunologic: No nasal congestion or hives.         Physical Examination:    Vitals:    10/01/18 1355   BP: 136/82   Pulse: 80   SpO2: 96%        Constitutional and General Appearance: NAD   Respiratory:  · Normal excursion and expansion without use of accessory muscles  · Resp Auscultation: Normal breath sounds without dullness  Cardiovascular:  · The apical impulses not displaced  · Heart tones are crisp and normal  · Cervical veins are not engorged  · The carotid upstroke is normal in amplitude and contour without delay or bruit  · Normal S1S2, No S3, No Murmur  · Peripheral pulses are symmetrical and full  · There is no clubbing, cyanosis of the extremities. · No edema  · Femoral Arteries: 2+ and equal  · Pedal Pulses: 2+ and equal   Abdomen:  · No masses or tenderness  · Liver/Spleen: No Abnormalities Noted  Neurological/Psychiatric:  · Alert and oriented in all spheres  · Moves all extremities well  · Exhibits normal gait balance and coordination  · No abnormalities of mood, affect, memory, mentation, or behavior are noted  Skin:  · Skin: warm and dry. Lab Results   Component Value Date    CHOL 226 (H) 08/31/2017    CHOL 168 01/15/2015    CHOL 146 08/08/2012     Lab Results   Component Value Date    TRIG 210 (H) 08/31/2017    TRIG 94 01/15/2015    TRIG 58 08/08/2012     Lab Results   Component Value Date    HDL 50 08/31/2017    HDL 37 (L) 01/15/2015    HDL 52 08/08/2012     Lab Results   Component Value Date    LDLCALC 134 (H) 08/31/2017    LDLCALC 112 (H) 01/15/2015    LDLCALC 82 08/08/2012     Lab Results   Component Value Date    LABVLDL 42 08/31/2017    LABVLDL 19 01/15/2015    LABVLDL 12 08/08/2012     No results found for: CHOLHDLRATIO    Assessment:     1. Palpitations:  His most recent EKG 8/14/18 NSR. Possible ventricular or atrial ectopy vs SVT vs VT vs sinus tachycardia. Will likely have him wear monitor after non-invasive testing completed. 2. Essential hypertension Stable and will continue present medical regimen. 3. Mixed hyperlipidemia  Managed by PCP. I personally reviewed most recent lipids from 8/31/17showing mildly suboptimal labs.  Will recheck near future

## 2018-10-26 ENCOUNTER — HOSPITAL ENCOUNTER (OUTPATIENT)
Dept: NON INVASIVE DIAGNOSTICS | Age: 63
Discharge: HOME | End: 2018-10-26
Payer: MEDICARE

## 2018-10-26 ENCOUNTER — HOSPITAL ENCOUNTER (OUTPATIENT)
Age: 63
Discharge: HOME | End: 2018-10-26
Payer: MEDICARE

## 2018-10-26 ENCOUNTER — TELEPHONE (OUTPATIENT)
Dept: CARDIOLOGY CLINIC | Age: 63
End: 2018-10-26

## 2018-10-26 ENCOUNTER — HOSPITAL ENCOUNTER (OUTPATIENT)
Dept: NUCLEAR MEDICINE | Age: 63
Discharge: HOME | End: 2018-10-26
Payer: MEDICARE

## 2018-10-26 DIAGNOSIS — R00.2 PALPITATIONS: ICD-10-CM

## 2018-10-26 DIAGNOSIS — R42 DIZZINESS: ICD-10-CM

## 2018-10-26 DIAGNOSIS — R07.2 PRECORDIAL PAIN: ICD-10-CM

## 2018-10-26 DIAGNOSIS — E78.2 MIXED HYPERLIPIDEMIA: ICD-10-CM

## 2018-10-26 DIAGNOSIS — R00.2 PALPITATIONS: Primary | ICD-10-CM

## 2018-10-26 LAB
CHOLESTEROL, TOTAL: 210 MG/DL (ref 0–199)
HDLC SERPL-MCNC: 49 MG/DL (ref 40–60)
LDL CHOLESTEROL CALCULATED: 130 MG/DL
LV EF: 55 %
LV EF: 58 %
LVEF MODALITY: NORMAL
LVEF MODALITY: NORMAL
TRIGL SERPL-MCNC: 157 MG/DL (ref 0–150)
VLDLC SERPL CALC-MCNC: 31 MG/DL

## 2018-10-26 PROCEDURE — 36415 COLL VENOUS BLD VENIPUNCTURE: CPT

## 2018-10-26 PROCEDURE — 93306 TTE W/DOPPLER COMPLETE: CPT

## 2018-10-26 PROCEDURE — 93017 CV STRESS TEST TRACING ONLY: CPT

## 2018-10-26 PROCEDURE — 78452 HT MUSCLE IMAGE SPECT MULT: CPT

## 2018-10-26 PROCEDURE — 3430000000 HC RX DIAGNOSTIC RADIOPHARMACEUTICAL: Performed by: INTERNAL MEDICINE

## 2018-10-26 PROCEDURE — A9502 TC99M TETROFOSMIN: HCPCS | Performed by: INTERNAL MEDICINE

## 2018-10-26 PROCEDURE — 80061 LIPID PANEL: CPT

## 2018-10-26 PROCEDURE — 6360000002 HC RX W HCPCS: Performed by: INTERNAL MEDICINE

## 2018-10-26 RX ADMIN — TETROFOSMIN 33 MILLICURIE: 0.23 INJECTION, POWDER, LYOPHILIZED, FOR SOLUTION INTRAVENOUS at 10:20

## 2018-10-26 RX ADMIN — TETROFOSMIN 11 MILLICURIE: 0.23 INJECTION, POWDER, LYOPHILIZED, FOR SOLUTION INTRAVENOUS at 09:05

## 2018-10-26 RX ADMIN — REGADENOSON 0.4 MG: 0.08 INJECTION, SOLUTION INTRAVENOUS at 10:20

## 2018-10-26 NOTE — TELEPHONE ENCOUNTER
Echo 2D w doppler w color complete   Order: [id number]   Status:  Final result   Visible to patient:  No (Not Released) Dx:  Palpitations; Precordial pain; Dizziness   Notes recorded by Chau Morgan MD on 10/26/2018 at 4:27 PM EDT  ECHO and stress test. Heart strength normal and no evidence for significant heart artery blockages. Good news. ? Electrical rhythm issue. Please order 30 day event monitor and make sure f/u appt afterwards. Thanks.      Attempted to reach

## 2018-10-29 ENCOUNTER — TELEPHONE (OUTPATIENT)
Dept: CARDIOLOGY CLINIC | Age: 63
End: 2018-10-29

## 2018-10-29 DIAGNOSIS — Z79.899 MEDICATION MANAGEMENT: Primary | ICD-10-CM

## 2018-10-29 RX ORDER — SIMVASTATIN 20 MG
20 TABLET ORAL NIGHTLY
Qty: 30 TABLET | Refills: 3 | Status: SHIPPED | OUTPATIENT
Start: 2018-10-29 | End: 2018-12-28 | Stop reason: SDUPTHER

## 2018-11-28 DIAGNOSIS — I10 ESSENTIAL HYPERTENSION: ICD-10-CM

## 2018-11-28 RX ORDER — LISINOPRIL 10 MG/1
TABLET ORAL
Qty: 30 TABLET | Refills: 5 | Status: SHIPPED | OUTPATIENT
Start: 2018-11-28 | End: 2019-06-19 | Stop reason: SDUPTHER

## 2018-12-20 PROCEDURE — 93228 REMOTE 30 DAY ECG REV/REPORT: CPT | Performed by: INTERNAL MEDICINE

## 2018-12-28 RX ORDER — SIMVASTATIN 20 MG
20 TABLET ORAL NIGHTLY
Qty: 30 TABLET | Refills: 0 | Status: SHIPPED | OUTPATIENT
Start: 2018-12-28 | End: 2019-01-15 | Stop reason: SDUPTHER

## 2019-01-15 ENCOUNTER — OFFICE VISIT (OUTPATIENT)
Dept: CARDIOLOGY CLINIC | Age: 64
End: 2019-01-15
Payer: MEDICARE

## 2019-01-15 VITALS
OXYGEN SATURATION: 97 % | HEIGHT: 70 IN | HEART RATE: 70 BPM | WEIGHT: 192 LBS | BODY MASS INDEX: 27.49 KG/M2 | DIASTOLIC BLOOD PRESSURE: 62 MMHG | SYSTOLIC BLOOD PRESSURE: 112 MMHG

## 2019-01-15 DIAGNOSIS — R00.2 PALPITATIONS: ICD-10-CM

## 2019-01-15 DIAGNOSIS — E78.2 MIXED HYPERLIPIDEMIA: ICD-10-CM

## 2019-01-15 DIAGNOSIS — I10 ESSENTIAL HYPERTENSION: Primary | ICD-10-CM

## 2019-01-15 PROCEDURE — G8484 FLU IMMUNIZE NO ADMIN: HCPCS | Performed by: INTERNAL MEDICINE

## 2019-01-15 PROCEDURE — G8419 CALC BMI OUT NRM PARAM NOF/U: HCPCS | Performed by: INTERNAL MEDICINE

## 2019-01-15 PROCEDURE — 3017F COLORECTAL CA SCREEN DOC REV: CPT | Performed by: INTERNAL MEDICINE

## 2019-01-15 PROCEDURE — 99214 OFFICE O/P EST MOD 30 MIN: CPT | Performed by: INTERNAL MEDICINE

## 2019-01-15 PROCEDURE — G8427 DOCREV CUR MEDS BY ELIG CLIN: HCPCS | Performed by: INTERNAL MEDICINE

## 2019-01-15 PROCEDURE — 4004F PT TOBACCO SCREEN RCVD TLK: CPT | Performed by: INTERNAL MEDICINE

## 2019-01-15 RX ORDER — HYDROCHLOROTHIAZIDE 25 MG/1
25 TABLET ORAL DAILY
Qty: 30 TABLET | Refills: 11 | Status: SHIPPED | OUTPATIENT
Start: 2019-01-15 | End: 2019-07-11 | Stop reason: ALTCHOICE

## 2019-01-15 RX ORDER — SIMVASTATIN 20 MG
20 TABLET ORAL NIGHTLY
Qty: 30 TABLET | Refills: 11 | Status: SHIPPED | OUTPATIENT
Start: 2019-01-15 | End: 2019-06-19

## 2019-01-15 RX ORDER — METOPROLOL SUCCINATE 25 MG/1
25 TABLET, EXTENDED RELEASE ORAL DAILY
Qty: 30 TABLET | Refills: 11 | Status: SHIPPED | OUTPATIENT
Start: 2019-01-15 | End: 2019-09-20 | Stop reason: SDUPTHER

## 2019-02-05 RX ORDER — FLUOXETINE HYDROCHLORIDE 40 MG/1
CAPSULE ORAL
Qty: 60 CAPSULE | Refills: 5 | Status: SHIPPED | OUTPATIENT
Start: 2019-02-05 | End: 2019-07-23 | Stop reason: SDUPTHER

## 2019-02-09 DIAGNOSIS — R00.2 PALPITATIONS: ICD-10-CM

## 2019-02-09 DIAGNOSIS — R42 DIZZINESS: ICD-10-CM

## 2019-02-11 ENCOUNTER — TELEPHONE (OUTPATIENT)
Dept: CARDIOLOGY CLINIC | Age: 64
End: 2019-02-11

## 2019-04-23 DIAGNOSIS — I10 ESSENTIAL HYPERTENSION: ICD-10-CM

## 2019-04-24 RX ORDER — LISINOPRIL 10 MG/1
TABLET ORAL
Qty: 30 TABLET | Refills: 3 | OUTPATIENT
Start: 2019-04-24

## 2019-04-29 RX ORDER — POTASSIUM CHLORIDE 750 MG/1
CAPSULE, EXTENDED RELEASE ORAL
Qty: 90 CAPSULE | Refills: 0 | OUTPATIENT
Start: 2019-04-29

## 2019-06-14 DIAGNOSIS — I10 ESSENTIAL HYPERTENSION: ICD-10-CM

## 2019-06-17 RX ORDER — LISINOPRIL 10 MG/1
TABLET ORAL
Qty: 30 TABLET | Refills: 4 | OUTPATIENT
Start: 2019-06-17

## 2019-06-19 ENCOUNTER — HOSPITAL ENCOUNTER (INPATIENT)
Age: 64
LOS: 1 days | Discharge: HOME | DRG: 305 | End: 2019-06-20
Attending: EMERGENCY MEDICINE | Admitting: INTERNAL MEDICINE
Payer: MEDICARE

## 2019-06-19 ENCOUNTER — APPOINTMENT (OUTPATIENT)
Dept: GENERAL RADIOLOGY | Age: 64
DRG: 305 | End: 2019-06-19
Payer: MEDICARE

## 2019-06-19 DIAGNOSIS — R07.9 CHEST PAIN, UNSPECIFIED TYPE: ICD-10-CM

## 2019-06-19 DIAGNOSIS — I10 ESSENTIAL HYPERTENSION: ICD-10-CM

## 2019-06-19 DIAGNOSIS — I10 HYPERTENSION, UNSPECIFIED TYPE: Primary | ICD-10-CM

## 2019-06-19 LAB
A/G RATIO: 1.7 (ref 1.1–2.2)
ALBUMIN SERPL-MCNC: 4.8 G/DL (ref 3.4–5)
ALP BLD-CCNC: 49 U/L (ref 40–129)
ALT SERPL-CCNC: 28 U/L (ref 10–40)
ANION GAP SERPL CALCULATED.3IONS-SCNC: 14 MMOL/L (ref 3–16)
AST SERPL-CCNC: 29 U/L (ref 15–37)
BASOPHILS ABSOLUTE: 0.1 K/UL (ref 0–0.2)
BASOPHILS RELATIVE PERCENT: 1.2 %
BILIRUB SERPL-MCNC: 0.4 MG/DL (ref 0–1)
BUN BLDV-MCNC: 20 MG/DL (ref 7–20)
CALCIUM SERPL-MCNC: 9.9 MG/DL (ref 8.3–10.6)
CHLORIDE BLD-SCNC: 100 MMOL/L (ref 99–110)
CO2: 24 MMOL/L (ref 21–32)
CREAT SERPL-MCNC: 0.9 MG/DL (ref 0.8–1.3)
EOSINOPHILS ABSOLUTE: 0.3 K/UL (ref 0–0.6)
EOSINOPHILS RELATIVE PERCENT: 6 %
GFR AFRICAN AMERICAN: >60
GFR NON-AFRICAN AMERICAN: >60
GLOBULIN: 2.8 G/DL
GLUCOSE BLD-MCNC: 89 MG/DL (ref 70–99)
HCT VFR BLD CALC: 41.5 % (ref 40.5–52.5)
HEMOGLOBIN: 14.2 G/DL (ref 13.5–17.5)
LYMPHOCYTES ABSOLUTE: 1.5 K/UL (ref 1–5.1)
LYMPHOCYTES RELATIVE PERCENT: 34.8 %
MCH RBC QN AUTO: 34.8 PG (ref 26–34)
MCHC RBC AUTO-ENTMCNC: 34.3 G/DL (ref 31–36)
MCV RBC AUTO: 101.5 FL (ref 80–100)
MONOCYTES ABSOLUTE: 0.4 K/UL (ref 0–1.3)
MONOCYTES RELATIVE PERCENT: 10.6 %
NEUTROPHILS ABSOLUTE: 2 K/UL (ref 1.7–7.7)
NEUTROPHILS RELATIVE PERCENT: 47.4 %
PDW BLD-RTO: 12.8 % (ref 12.4–15.4)
PLATELET # BLD: 197 K/UL (ref 135–450)
PMV BLD AUTO: 7.2 FL (ref 5–10.5)
POTASSIUM SERPL-SCNC: 4.2 MMOL/L (ref 3.5–5.1)
RBC # BLD: 4.09 M/UL (ref 4.2–5.9)
SODIUM BLD-SCNC: 138 MMOL/L (ref 136–145)
TOTAL PROTEIN: 7.6 G/DL (ref 6.4–8.2)
TROPONIN: <0.01 NG/ML
TROPONIN: <0.01 NG/ML
WBC # BLD: 4.2 K/UL (ref 4–11)

## 2019-06-19 PROCEDURE — 80053 COMPREHEN METABOLIC PANEL: CPT

## 2019-06-19 PROCEDURE — 6370000000 HC RX 637 (ALT 250 FOR IP): Performed by: INTERNAL MEDICINE

## 2019-06-19 PROCEDURE — 6360000002 HC RX W HCPCS: Performed by: INTERNAL MEDICINE

## 2019-06-19 PROCEDURE — 85025 COMPLETE CBC W/AUTO DIFF WBC: CPT

## 2019-06-19 PROCEDURE — 2580000003 HC RX 258: Performed by: INTERNAL MEDICINE

## 2019-06-19 PROCEDURE — 71045 X-RAY EXAM CHEST 1 VIEW: CPT

## 2019-06-19 PROCEDURE — 36415 COLL VENOUS BLD VENIPUNCTURE: CPT

## 2019-06-19 PROCEDURE — 2060000000 HC ICU INTERMEDIATE R&B

## 2019-06-19 PROCEDURE — 99285 EMERGENCY DEPT VISIT HI MDM: CPT

## 2019-06-19 PROCEDURE — G0378 HOSPITAL OBSERVATION PER HR: HCPCS

## 2019-06-19 PROCEDURE — 96374 THER/PROPH/DIAG INJ IV PUSH: CPT

## 2019-06-19 PROCEDURE — 84484 ASSAY OF TROPONIN QUANT: CPT

## 2019-06-19 PROCEDURE — 96372 THER/PROPH/DIAG INJ SC/IM: CPT

## 2019-06-19 PROCEDURE — 2580000003 HC RX 258: Performed by: EMERGENCY MEDICINE

## 2019-06-19 PROCEDURE — 93005 ELECTROCARDIOGRAM TRACING: CPT | Performed by: EMERGENCY MEDICINE

## 2019-06-19 PROCEDURE — 6370000000 HC RX 637 (ALT 250 FOR IP): Performed by: EMERGENCY MEDICINE

## 2019-06-19 RX ORDER — ONDANSETRON 2 MG/ML
4 INJECTION INTRAMUSCULAR; INTRAVENOUS EVERY 6 HOURS PRN
Status: DISCONTINUED | OUTPATIENT
Start: 2019-06-19 | End: 2019-06-20 | Stop reason: HOSPADM

## 2019-06-19 RX ORDER — FINASTERIDE 5 MG/1
5 TABLET, FILM COATED ORAL DAILY
Status: DISCONTINUED | OUTPATIENT
Start: 2019-06-19 | End: 2019-06-20 | Stop reason: HOSPADM

## 2019-06-19 RX ORDER — ASPIRIN 81 MG/1
81 TABLET, CHEWABLE ORAL DAILY
Status: DISCONTINUED | OUTPATIENT
Start: 2019-06-20 | End: 2019-06-20 | Stop reason: HOSPADM

## 2019-06-19 RX ORDER — PANTOPRAZOLE SODIUM 40 MG/1
40 TABLET, DELAYED RELEASE ORAL
Status: DISCONTINUED | OUTPATIENT
Start: 2019-06-20 | End: 2019-06-20 | Stop reason: HOSPADM

## 2019-06-19 RX ORDER — NITROGLYCERIN 0.4 MG/1
0.4 TABLET SUBLINGUAL EVERY 5 MIN PRN
Status: DISCONTINUED | OUTPATIENT
Start: 2019-06-19 | End: 2019-06-20 | Stop reason: HOSPADM

## 2019-06-19 RX ORDER — MORPHINE SULFATE 4 MG/ML
2 INJECTION, SOLUTION INTRAMUSCULAR; INTRAVENOUS EVERY 4 HOURS PRN
Status: DISCONTINUED | OUTPATIENT
Start: 2019-06-19 | End: 2019-06-20 | Stop reason: HOSPADM

## 2019-06-19 RX ORDER — HYDRALAZINE HYDROCHLORIDE 20 MG/ML
10 INJECTION INTRAMUSCULAR; INTRAVENOUS EVERY 6 HOURS PRN
Status: DISCONTINUED | OUTPATIENT
Start: 2019-06-19 | End: 2019-06-20 | Stop reason: HOSPADM

## 2019-06-19 RX ORDER — SODIUM CHLORIDE 0.9 % (FLUSH) 0.9 %
10 SYRINGE (ML) INJECTION PRN
Status: DISCONTINUED | OUTPATIENT
Start: 2019-06-19 | End: 2019-06-20 | Stop reason: HOSPADM

## 2019-06-19 RX ORDER — CARVEDILOL 25 MG/1
25 TABLET ORAL 2 TIMES DAILY
Status: DISCONTINUED | OUTPATIENT
Start: 2019-06-19 | End: 2019-06-20

## 2019-06-19 RX ORDER — SODIUM CHLORIDE 9 MG/ML
INJECTION, SOLUTION INTRAVENOUS CONTINUOUS
Status: DISCONTINUED | OUTPATIENT
Start: 2019-06-19 | End: 2019-06-19

## 2019-06-19 RX ORDER — FINASTERIDE 5 MG/1
5 TABLET, FILM COATED ORAL DAILY
COMMUNITY

## 2019-06-19 RX ORDER — DOXAZOSIN 2 MG/1
4 TABLET ORAL DAILY
Status: DISCONTINUED | OUTPATIENT
Start: 2019-06-19 | End: 2019-06-20 | Stop reason: HOSPADM

## 2019-06-19 RX ORDER — LISINOPRIL 10 MG/1
10 TABLET ORAL DAILY
Status: DISCONTINUED | OUTPATIENT
Start: 2019-06-19 | End: 2019-06-20

## 2019-06-19 RX ORDER — POTASSIUM CHLORIDE 750 MG/1
10 TABLET, EXTENDED RELEASE ORAL DAILY
Status: DISCONTINUED | OUTPATIENT
Start: 2019-06-19 | End: 2019-06-20 | Stop reason: HOSPADM

## 2019-06-19 RX ORDER — ASPIRIN 81 MG/1
243 TABLET, CHEWABLE ORAL ONCE
Status: COMPLETED | OUTPATIENT
Start: 2019-06-19 | End: 2019-06-19

## 2019-06-19 RX ORDER — IBUPROFEN 600 MG/1
600 TABLET ORAL
Status: DISCONTINUED | OUTPATIENT
Start: 2019-06-20 | End: 2019-06-20 | Stop reason: HOSPADM

## 2019-06-19 RX ORDER — HYDROCHLOROTHIAZIDE 25 MG/1
25 TABLET ORAL DAILY
Status: DISCONTINUED | OUTPATIENT
Start: 2019-06-19 | End: 2019-06-20 | Stop reason: HOSPADM

## 2019-06-19 RX ORDER — SIMVASTATIN 10 MG
20 TABLET ORAL NIGHTLY
Status: DISCONTINUED | OUTPATIENT
Start: 2019-06-19 | End: 2019-06-20 | Stop reason: HOSPADM

## 2019-06-19 RX ORDER — FLUOXETINE 10 MG/1
40 CAPSULE ORAL 2 TIMES DAILY
Status: DISCONTINUED | OUTPATIENT
Start: 2019-06-19 | End: 2019-06-20 | Stop reason: HOSPADM

## 2019-06-19 RX ORDER — METOPROLOL SUCCINATE 25 MG/1
25 TABLET, EXTENDED RELEASE ORAL DAILY
Status: DISCONTINUED | OUTPATIENT
Start: 2019-06-19 | End: 2019-06-19

## 2019-06-19 RX ORDER — SODIUM CHLORIDE 0.9 % (FLUSH) 0.9 %
10 SYRINGE (ML) INJECTION EVERY 12 HOURS SCHEDULED
Status: DISCONTINUED | OUTPATIENT
Start: 2019-06-19 | End: 2019-06-20 | Stop reason: HOSPADM

## 2019-06-19 RX ORDER — LISINOPRIL 10 MG/1
TABLET ORAL
Qty: 30 TABLET | Refills: 0 | Status: ON HOLD | OUTPATIENT
Start: 2019-06-19 | End: 2019-06-20 | Stop reason: HOSPADM

## 2019-06-19 RX ADMIN — POTASSIUM CHLORIDE 10 MEQ: 10 TABLET, EXTENDED RELEASE ORAL at 22:21

## 2019-06-19 RX ADMIN — SIMVASTATIN 20 MG: 10 TABLET, FILM COATED ORAL at 22:58

## 2019-06-19 RX ADMIN — CARVEDILOL 25 MG: 25 TABLET, FILM COATED ORAL at 22:58

## 2019-06-19 RX ADMIN — SODIUM CHLORIDE: 9 INJECTION, SOLUTION INTRAVENOUS at 17:33

## 2019-06-19 RX ADMIN — MORPHINE SULFATE 2 MG: 4 INJECTION INTRAVENOUS at 22:59

## 2019-06-19 RX ADMIN — Medication 10 ML: at 22:21

## 2019-06-19 RX ADMIN — NITROGLYCERIN 0.4 MG: 0.4 TABLET SUBLINGUAL at 17:44

## 2019-06-19 RX ADMIN — ENOXAPARIN SODIUM 40 MG: 40 INJECTION SUBCUTANEOUS at 22:21

## 2019-06-19 RX ADMIN — ASPIRIN 81 MG 243 MG: 81 TABLET ORAL at 17:33

## 2019-06-19 RX ADMIN — DOXAZOSIN 4 MG: 2 TABLET ORAL at 22:20

## 2019-06-19 RX ADMIN — NITROGLYCERIN 0.4 MG: 0.4 TABLET SUBLINGUAL at 17:39

## 2019-06-19 RX ADMIN — NITROGLYCERIN 1 INCH: 20 OINTMENT TOPICAL at 17:53

## 2019-06-19 RX ADMIN — NITROGLYCERIN 0.4 MG: 0.4 TABLET SUBLINGUAL at 17:33

## 2019-06-19 RX ADMIN — Medication 10 ML: at 22:58

## 2019-06-19 ASSESSMENT — ENCOUNTER SYMPTOMS
DIARRHEA: 1
CONSTIPATION: 0
NAUSEA: 0
PHOTOPHOBIA: 0
SHORTNESS OF BREATH: 0
SORE THROAT: 0
ABDOMINAL PAIN: 0
VOMITING: 0

## 2019-06-19 ASSESSMENT — PAIN DESCRIPTION - LOCATION
LOCATION: CHEST
LOCATION: CHEST

## 2019-06-19 ASSESSMENT — PAIN SCALES - GENERAL
PAINLEVEL_OUTOF10: 4
PAINLEVEL_OUTOF10: 1
PAINLEVEL_OUTOF10: 0
PAINLEVEL_OUTOF10: 1
PAINLEVEL_OUTOF10: 2

## 2019-06-19 ASSESSMENT — PAIN DESCRIPTION - PAIN TYPE: TYPE: ACUTE PAIN

## 2019-06-19 ASSESSMENT — PAIN - FUNCTIONAL ASSESSMENT: PAIN_FUNCTIONAL_ASSESSMENT: PREVENTS OR INTERFERES WITH ALL ACTIVE AND SOME PASSIVE ACTIVITIES

## 2019-06-19 ASSESSMENT — PAIN DESCRIPTION - DESCRIPTORS
DESCRIPTORS: PRESSURE
DESCRIPTORS: PRESSURE

## 2019-06-19 ASSESSMENT — PAIN DESCRIPTION - PROGRESSION: CLINICAL_PROGRESSION: GRADUALLY IMPROVING

## 2019-06-19 ASSESSMENT — PAIN DESCRIPTION - ORIENTATION: ORIENTATION: MID

## 2019-06-19 ASSESSMENT — PAIN DESCRIPTION - ONSET: ONSET: GRADUAL

## 2019-06-19 ASSESSMENT — PAIN DESCRIPTION - FREQUENCY: FREQUENCY: INTERMITTENT

## 2019-06-19 NOTE — ED PROVIDER NOTES
Patient presents emergency department with complaint of hypertension and chest pressure. Patient states that his pressure began at approximately 12:00 today. Patient states that he would rank it is a 2 out of 10. He has had no headaches no dizziness no difficulty speaking chewing or swallowing. He said no abdominal pain. He did have an episode of diarrhea. But no dysuria and no shortness of breath. Patient has chronic arthritic pain to his shoulders and he states that is unchanged. He has never had nitroglycerin. He does follow-up with cardiologist and had a stress test last year. Patient had a catheterization done 15 years ago. Patient quit smoking many years ago but does chew tobacco.  Patient drinks 4-5 beers every night. Nothing has made his pressure better or worse. Patient was recently out of his lisinopril for 3 days but started it back up yesterday. He was not out any other medications. Patient last took Viagra 5 days ago. PAST MEDICAL HISTORY   has a past medical history of Acid reflux, Arthritis, Depression, Enlarged prostate, Hearing decreased, Hyperlipidemia, Hypertension, and Muscle pain. PAST SURGICAL HISTORY   has a past surgical history that includes Appendectomy; Wrist surgery; other surgical history (09/27/2012); Cardiac catheterization; Cystocopy; Colonoscopy (7/25/2013); and Upper gastrointestinal endoscopy (7/25/2013). FAMILY HISTORY  family history includes Cancer in his mother; Diabetes in his mother; Heart Disease in his father; High Blood Pressure in his father; Stroke in his mother. SOCIAL HISTORY   reports that he quit smoking about 25 years ago. He has a 30.00 pack-year smoking history. His smokeless tobacco use includes snuff. He reports that he drinks about 12.6 oz of alcohol per week. He reports that he has current or past drug history. Drug: Marijuana.     HOME MEDICATIONS     Prior to Admission medications    Medication Sig Start Date End Date Taking? Authorizing Provider   lisinopril (PRINIVIL;ZESTRIL) 10 MG tablet TAKE 1 TABLET BY MOUTH DAILY 6/19/19   SATISH Trejo CNP   FLUoxetine (PROZAC) 40 MG capsule TAKE 1 CAPSULE BY MOUTH TWICE A DAY 2/5/19   SATISH Trejo CNP   simvastatin (ZOCOR) 20 MG tablet Take 1 tablet by mouth nightly 1/15/19   Zulay Ruiz MD   hydrochlorothiazide (HYDRODIURIL) 25 MG tablet Take 1 tablet by mouth daily 1/15/19   Zulay Ruiz MD   metoprolol succinate (TOPROL XL) 25 MG extended release tablet Take 1 tablet by mouth daily 1/15/19   Zulay Ruiz MD   sildenafil (REVATIO) 20 MG tablet Take 20 mg by mouth as needed    Historical Provider, MD   potassium chloride (KLOR-CON 10) 10 MEQ extended release tablet Take 1 tablet by mouth daily 6/21/18   Dameon Dai MD   albuterol sulfate HFA (VENTOLIN HFA) 108 (90 Base) MCG/ACT inhaler Inhale 2 puffs into the lungs every 6 hours as needed for Wheezing 5/3/18   SATISH Trejo CNP   ibuprofen (ADVIL;MOTRIN) 800 MG tablet Take 1 tablet by mouth every 8 hours as needed for Pain 4/18/17   Dameon Dai MD   sulindac (CLINORIL) 150 MG tablet TAKE 1 TABLET BY MOUTH 2 TIMES DAILY. 11/28/16   Historical Provider, MD   FIBER COMPLETE PO Take by mouth    Historical Provider, MD   terazosin (HYTRIN) 5 MG capsule TAKE 1 CAPSULE BY MOUTH NIGHTLY. 11/28/16   Dameon Dai MD   Tocilizumab (ACTEMRA IV) Infuse  intravenously. Historical Provider, MD   aspirin 81 MG chewable tablet Take 81 mg by mouth daily. Historical Provider, MD   omeprazole (PRILOSEC) 20 MG capsule Take 20 mg by mouth Daily     Historical Provider, MD        ALLERGIES  is allergic to codeine and pcn [penicillins]. Review of Systems   Constitutional: Negative for chills and fever. HENT: Negative for ear pain and sore throat. Eyes: Negative for photophobia. Respiratory: Negative for shortness of breath. Cardiovascular: Positive for chest pain. Gastrointestinal: Positive for diarrhea. Negative for abdominal pain, constipation, nausea and vomiting. Endocrine: Negative for cold intolerance and heat intolerance. Genitourinary: Negative for dysuria. Musculoskeletal: Positive for arthralgias. Negative for myalgias. Skin: Negative for rash. Neurological: Negative for dizziness, speech difficulty, numbness and headaches. Physical Exam   Constitutional: He is oriented to person, place, and time. He appears well-developed. No distress. HENT:   Head: Normocephalic and atraumatic. Right Ear: Tympanic membrane and external ear normal.   Left Ear: Tympanic membrane and external ear normal.   Mouth/Throat: Oropharynx is clear and moist. No oropharyngeal exudate. Eyes: Pupils are equal, round, and reactive to light. Conjunctivae are normal. Right eye exhibits no discharge. Left eye exhibits no discharge. Neck: Normal range of motion. Neck supple. No JVD present. Cardiovascular: Normal rate, regular rhythm, normal heart sounds and intact distal pulses. Exam reveals no gallop and no friction rub. No murmur heard. Pulmonary/Chest: Effort normal and breath sounds normal. No stridor. No respiratory distress. He has no wheezes. He has no rales. He exhibits no tenderness. Abdominal: Soft. Bowel sounds are normal. He exhibits no distension and no mass. There is no tenderness. There is no rigidity, no rebound and no guarding. No hernia. Musculoskeletal: Normal range of motion. He exhibits no edema or tenderness. Neurological: He is alert and oriented to person, place, and time. He has normal strength. No cranial nerve deficit or sensory deficit. He exhibits normal muscle tone. Coordination normal. GCS eye subscore is 4. GCS verbal subscore is 5. GCS motor subscore is 6. Skin: Skin is warm and dry. Capillary refill takes less than 2 seconds. No rash noted. He is not diaphoretic. No erythema. No pallor.    Psychiatric: He has a normal mood and

## 2019-06-19 NOTE — ED NOTES
[facility]     Monty Strong Day  06/19/19 1934

## 2019-06-19 NOTE — ED NOTES
PT STS HE IS HAVING CHEST PRESSURE MIDSTERNAL TODAY ALSO. DENIES SOB.      Rodriguez Gomez, RN  06/19/19 2426

## 2019-06-20 VITALS
RESPIRATION RATE: 18 BRPM | HEART RATE: 88 BPM | TEMPERATURE: 97.9 F | BODY MASS INDEX: 28.65 KG/M2 | DIASTOLIC BLOOD PRESSURE: 73 MMHG | OXYGEN SATURATION: 96 % | WEIGHT: 200.13 LBS | SYSTOLIC BLOOD PRESSURE: 123 MMHG | HEIGHT: 70 IN

## 2019-06-20 PROBLEM — R07.9 CHEST PAIN: Status: RESOLVED | Noted: 2019-06-19 | Resolved: 2019-06-20

## 2019-06-20 LAB
CHOLESTEROL, TOTAL: 178 MG/DL (ref 0–199)
EKG ATRIAL RATE: 62 BPM
EKG ATRIAL RATE: 77 BPM
EKG DIAGNOSIS: NORMAL
EKG DIAGNOSIS: NORMAL
EKG P AXIS: 53 DEGREES
EKG P AXIS: 54 DEGREES
EKG P-R INTERVAL: 158 MS
EKG P-R INTERVAL: 170 MS
EKG Q-T INTERVAL: 396 MS
EKG Q-T INTERVAL: 456 MS
EKG QRS DURATION: 84 MS
EKG QRS DURATION: 86 MS
EKG QTC CALCULATION (BAZETT): 448 MS
EKG QTC CALCULATION (BAZETT): 462 MS
EKG R AXIS: 51 DEGREES
EKG R AXIS: 78 DEGREES
EKG T AXIS: 66 DEGREES
EKG T AXIS: 73 DEGREES
EKG VENTRICULAR RATE: 62 BPM
EKG VENTRICULAR RATE: 77 BPM
HCT VFR BLD CALC: 37.5 % (ref 40.5–52.5)
HDLC SERPL-MCNC: 44 MG/DL (ref 40–60)
HEMOGLOBIN: 13 G/DL (ref 13.5–17.5)
LDL CHOLESTEROL CALCULATED: ABNORMAL MG/DL
LDL CHOLESTEROL DIRECT: 99 MG/DL
MCH RBC QN AUTO: 35.5 PG (ref 26–34)
MCHC RBC AUTO-ENTMCNC: 34.7 G/DL (ref 31–36)
MCV RBC AUTO: 102.2 FL (ref 80–100)
PDW BLD-RTO: 13 % (ref 12.4–15.4)
PLATELET # BLD: 173 K/UL (ref 135–450)
PMV BLD AUTO: 7.2 FL (ref 5–10.5)
RBC # BLD: 3.67 M/UL (ref 4.2–5.9)
TRIGL SERPL-MCNC: 347 MG/DL (ref 0–150)
TROPONIN: <0.01 NG/ML
VLDLC SERPL CALC-MCNC: ABNORMAL MG/DL
WBC # BLD: 3.9 K/UL (ref 4–11)

## 2019-06-20 PROCEDURE — G0378 HOSPITAL OBSERVATION PER HR: HCPCS

## 2019-06-20 PROCEDURE — 6370000000 HC RX 637 (ALT 250 FOR IP): Performed by: INTERNAL MEDICINE

## 2019-06-20 PROCEDURE — 99222 1ST HOSP IP/OBS MODERATE 55: CPT | Performed by: INTERNAL MEDICINE

## 2019-06-20 PROCEDURE — 93010 ELECTROCARDIOGRAM REPORT: CPT | Performed by: INTERNAL MEDICINE

## 2019-06-20 PROCEDURE — 36415 COLL VENOUS BLD VENIPUNCTURE: CPT

## 2019-06-20 PROCEDURE — 93005 ELECTROCARDIOGRAM TRACING: CPT | Performed by: INTERNAL MEDICINE

## 2019-06-20 PROCEDURE — 2580000003 HC RX 258: Performed by: INTERNAL MEDICINE

## 2019-06-20 PROCEDURE — 85027 COMPLETE CBC AUTOMATED: CPT

## 2019-06-20 PROCEDURE — 80061 LIPID PANEL: CPT

## 2019-06-20 PROCEDURE — 96372 THER/PROPH/DIAG INJ SC/IM: CPT

## 2019-06-20 PROCEDURE — 6360000002 HC RX W HCPCS: Performed by: INTERNAL MEDICINE

## 2019-06-20 PROCEDURE — 99217 PR OBSERVATION CARE DISCHARGE MANAGEMENT: CPT | Performed by: INTERNAL MEDICINE

## 2019-06-20 RX ORDER — LISINOPRIL 20 MG/1
20 TABLET ORAL DAILY
Qty: 30 TABLET | Refills: 0 | Status: SHIPPED | OUTPATIENT
Start: 2019-06-21 | End: 2019-07-23 | Stop reason: SDUPTHER

## 2019-06-20 RX ORDER — LISINOPRIL 20 MG/1
20 TABLET ORAL DAILY
Status: DISCONTINUED | OUTPATIENT
Start: 2019-06-20 | End: 2019-06-20 | Stop reason: HOSPADM

## 2019-06-20 RX ORDER — METOPROLOL SUCCINATE 25 MG/1
25 TABLET, EXTENDED RELEASE ORAL DAILY
Status: DISCONTINUED | OUTPATIENT
Start: 2019-06-20 | End: 2019-06-20 | Stop reason: HOSPADM

## 2019-06-20 RX ADMIN — Medication 10 ML: at 09:47

## 2019-06-20 RX ADMIN — DOXAZOSIN 4 MG: 2 TABLET ORAL at 09:48

## 2019-06-20 RX ADMIN — FINASTERIDE 5 MG: 5 TABLET, FILM COATED ORAL at 09:48

## 2019-06-20 RX ADMIN — PANTOPRAZOLE SODIUM 40 MG: 40 TABLET, DELAYED RELEASE ORAL at 09:49

## 2019-06-20 RX ADMIN — ASPIRIN 81 MG 81 MG: 81 TABLET ORAL at 09:48

## 2019-06-20 RX ADMIN — METOPROLOL SUCCINATE 25 MG: 25 TABLET, FILM COATED, EXTENDED RELEASE ORAL at 12:33

## 2019-06-20 RX ADMIN — ENOXAPARIN SODIUM 40 MG: 40 INJECTION SUBCUTANEOUS at 09:49

## 2019-06-20 RX ADMIN — FLUOXETINE 40 MG: 10 CAPSULE ORAL at 09:48

## 2019-06-20 RX ADMIN — HYDROCHLOROTHIAZIDE 25 MG: 25 TABLET ORAL at 09:47

## 2019-06-20 RX ADMIN — POTASSIUM CHLORIDE 10 MEQ: 10 TABLET, EXTENDED RELEASE ORAL at 09:47

## 2019-06-20 RX ADMIN — LISINOPRIL 20 MG: 20 TABLET ORAL at 09:49

## 2019-06-20 NOTE — PLAN OF CARE
Problem: SAFETY  Goal: Free from accidental physical injury  Outcome: Ongoing  Goal: Free from intentional harm  Outcome: Ongoing     Problem: DAILY CARE  Goal: Daily care needs are met  Outcome: Ongoing     Problem: PAIN  Goal: Patient's pain/discomfort is manageable  Outcome: Ongoing     Problem: SKIN INTEGRITY  Goal: Skin integrity is maintained or improved  Outcome: Ongoing     Problem: KNOWLEDGE DEFICIT  Goal: Patient/S.O. demonstrates understanding of disease process, treatment plan, medications, and discharge instructions. Outcome: Ongoing     Problem: DISCHARGE BARRIERS  Goal: Patient's continuum of care needs are met  Outcome: Ongoing     Problem: Pain:  Description  Pain management should include both nonpharmacologic and pharmacologic interventions.   Goal: Pain level will decrease  Description  Pain level will decrease  Outcome: Ongoing  Goal: Control of acute pain  Description  Control of acute pain  Outcome: Ongoing  Goal: Control of chronic pain  Description  Control of chronic pain  Outcome: Ongoing

## 2019-06-20 NOTE — CARE COORDINATION
DISCHARGE ORDER  Date/Time 2019 12:29 PM  Completed by: Erich Carias, Case Management    Patient Name: Nellie Barrientos    : 1955  Admitting Diagnosis: Chest pain [R07.9]  Chest pain [R07.9]  Admit Date/Time: 2019  4:59 PM    Noted discharge order. Confirmed discharge plan with patient / family (wife): Yes   Discharge Plan: Noted order for dc. Met with pt and wife briefly prior to dc. States is indep at home with all care. Denies services. Declines HHC. Chart reviewed and no dc needs identified.

## 2019-06-20 NOTE — H&P
discomfort  Gastrointestinal:  Negative for nausea, vomiting, diarrhea  Genitourinary:  Negative for polyuria, dysuria   Hematologic/Lymphatic:  Negative for  bleeding tendency, easy bruising  Musculoskeletal:  Negative for myalgias and arthralgias  Neurologic:  Negative for  confusion,dysarthria. Skin:  Negative for itching,rash  Psychiatric:  Negative for depression,anxiety, agitation. Endocrine:  Negative for polydipsia,polyuria,heat /cold intolerance. PHYSICAL EXAM:    BP (!) 153/89   Pulse 72   Temp 98.1 °F (36.7 °C) (Oral)   Resp 16   Ht 5' 10\" (1.778 m)   Wt 199 lb 6.4 oz (90.4 kg)   SpO2 97%   BMI 28.61 kg/m²     General appearance:  No apparent distress, appears stated age and cooperative. HEENT:  Normal cephalic, atraumatic without obvious deformity. Pupils equal, round, and reactive to light. Extra ocular muscles intact. Conjunctivae/corneas clear. Neck: Supple, with full range of motion. No jugular venous distention. Trachea midline. Respiratory:  Normal respiratory effort. Clear to auscultation, bilaterally without Rales/Wheezes/Rhonchi. Cardiovascular:  Regular rate and rhythm with normal S1/S2 without murmurs, rubs or gallops. Abdomen: Soft, non-tender, non-distended with normal bowel sounds. Musculoskeletal:  No clubbing, cyanosis or edema bilaterally. Full range of motion without deformity. Skin: Skin color, texture, turgor normal.  No rashes or lesions. Neurologic:  Neurovascularly intact without any focal sensory/motor deficits.  Cranial nerves: II-XII intact, grossly non-focal.  Psychiatric:  Alert and oriented, thought content appropriate, normal insight  Capillary Refill: Brisk,< 3 seconds   Peripheral Pulses: +2 palpable, equal bilaterally       Labs:   Recent Labs     06/19/19  1715   WBC 4.2   HGB 14.2   HCT 41.5        Recent Labs     06/19/19  1715      K 4.2      CO2 24   BUN 20   CREATININE 0.9   CALCIUM 9.9     Recent Labs     06/19/19  1715   AST

## 2019-06-20 NOTE — PROGRESS NOTES
Discharge instructions given and patient voiced understanding. Copy of discharge and meds to beds brought medication lisinopril and medication with patient. IV removed. Vitals stable. CP and PE completed. Leaving with all personal belongings. No further needs upon discharge.

## 2019-06-20 NOTE — PLAN OF CARE
Problem: SAFETY  Goal: Free from accidental physical injury  6/20/2019 0114 by René Persaud RN  Outcome: Ongoing  6/20/2019 0007 by René Persaud RN  Outcome: Ongoing  Goal: Free from intentional harm  6/20/2019 0114 by René Persaud RN  Outcome: Ongoing  6/20/2019 0007 by René Persaud RN  Outcome: Ongoing     Problem: PAIN  Goal: Patient's pain/discomfort is manageable  6/20/2019 0114 by René Persaud RN  Outcome: Ongoing  6/20/2019 0007 by René Persaud RN  Outcome: Ongoing     Problem: SKIN INTEGRITY  Goal: Skin integrity is maintained or improved  6/20/2019 0114 by René Persaud RN  Outcome: Ongoing  6/20/2019 0007 by René Persaud RN  Outcome: Ongoing     Problem: KNOWLEDGE DEFICIT  Goal: Patient/S.O. demonstrates understanding of disease process, treatment plan, medications, and discharge instructions.   6/20/2019 0114 by René Persaud RN  Outcome: Ongoing  6/20/2019 0007 by René Persaud RN  Outcome: Ongoing     Problem: DISCHARGE BARRIERS  Goal: Patient's continuum of care needs are met  6/20/2019 0114 by René Persaud RN  Outcome: Ongoing  6/20/2019 0007 by René Persaud RN  Outcome: Ongoing

## 2019-06-20 NOTE — PROGRESS NOTES
Assessment completed as charted. Patient denies any needs at this time. No c/o c/p. Vss. Call light in reach will monitor.

## 2019-06-20 NOTE — DISCHARGE SUMMARY
Name:  Christine Husain  Room:  /9417-86  MRN:    [id number]    Discharge Summary    This discharge summary is in conjunction with a complete physical exam done on the day of discharge. Discharging Physician: Dr. Maradiaga [place]: 6/19/2019  Discharge:   6/20/2019     HPI taken from admission H&P:      60 y.o. male who presented to the hospital with a chief complaint chest pain. The patient has a history of rheumatoid arthritis and is on an immunologic infusion monthly. He also has a history of hypertension, depression and BPH. He states that she's been very weak over the last couple of days with sternal chest tightness and discomfort. He has had no energy and has been unable to get out of bed really. He endorses this discomfort in his chest tightness, nonradiating and has been persistent. He did endorse get some relief in the emergency room when he got nitroglycerin. The pain never did go away and is still there. At home his wife noted his blood pressure has been high and this was confirmed at the emergency room tonight. Of note he recently had a negative stress test in October 2018. He denies any nausea, vomiting, shortness of breath, diaphoresis, abdominal pain. He'll be admitted for cardiology evaluation. Diagnoses this Admission and Hospital Course     #Chest pain  - suspect related to uncontrolled BP  - he had a negative stress test in oct 2018  - Ruled out ACS with negative serial trop and a non ischemic EKG  - Chest pain resolved with control of BP  - seen by cardiology, BP meds adjusted as below, no further testing    #Hypertensive Urgency   - /110 on admission   - continued home toprol XL and HCTZ  - increased lisinopril from 10 mg daily to 20 mg daily   - see PCP 1 week.   Home monitoring of BP    #Rheumatoid Arthritis  - gets IV infusions     #Depression  - cont home meds    Procedures (Please Review Full Report for Details)  None     Consults    Cardiology     Physical Exam at Discharge:    BP (!) 158/84   Pulse 73   Temp 97.9 °F (36.6 °C) (Oral)   Resp 18   Ht 5' 10\" (1.778 m)   Wt 200 lb 2 oz (90.8 kg)   SpO2 96%   BMI 28.71 kg/m²     Gen: No distress. Alert. Eyes: PERRL. No sclera icterus. No conjunctival injection. ENT: No discharge. Pharynx clear. Neck: No JVD. Trachea midline. Resp: No accessory muscle use. No crackles. No wheezes. No rhonchi. CV: Regular rate. Regular rhythm. No murmur. No rub. No edema. GI: Non-tender. Non-distended. Normal bowel sounds. Skin: Warm and dry. No nodule on exposed extremities. No rash on exposed extremities. M/S: No cyanosis. No joint deformity. No clubbing. Neuro: Awake. Grossly nonfocal    Psych: Oriented x 3. No anxiety or agitation. CBC:   Recent Labs     06/19/19  1715 06/20/19  0500   WBC 4.2 3.9*   HGB 14.2 13.0*   HCT 41.5 37.5*   .5* 102.2*    173     BMP:   Recent Labs     06/19/19  1715      K 4.2      CO2 24   BUN 20   CREATININE 0.9     LIVER PROFILE:   Recent Labs     06/19/19  1715   AST 29   ALT 28   BILITOT 0.4   ALKPHOS 49     CULTURES  None     RADIOLOGY  XR CHEST PORTABLE   Final Result   No active cardiopulmonary disease           Stress test  10/2018   Normal myocardial perfusion normal left ventricular function and wall    motion.    The estimated left ventricular function is 55%.    The left ventricular size is mildly dilated.      Discharge Medications     Medication List      CHANGE how you take these medications    lisinopril 20 MG tablet  Commonly known as:  PRINIVIL;ZESTRIL  Take 1 tablet by mouth daily  Start taking on:  6/21/2019  What changed:    · medication strength  · how much to take  · how to take this  · when to take this  · additional instructions        CONTINUE taking these medications    ACTEMRA IV     aspirin 81 MG chewable tablet     FLUoxetine 40 MG capsule  Commonly known as:  PROZAC  TAKE 1 CAPSULE BY MOUTH TWICE A DAY     hydrochlorothiazide 25 MG tablet  Commonly known as:  HYDRODIURIL  Take 1 tablet by mouth daily     metoprolol succinate 25 MG extended release tablet  Commonly known as:  TOPROL XL  Take 1 tablet by mouth daily     potassium chloride 10 MEQ extended release tablet  Commonly known as:  KLOR-CON 10  Take 1 tablet by mouth daily     sildenafil 20 MG tablet  Commonly known as:  REVATIO     sulindac 150 MG tablet  Commonly known as:  CLINORIL     terazosin 5 MG capsule  Commonly known as:  HYTRIN  TAKE 1 CAPSULE BY MOUTH NIGHTLY. STOP taking these medications    albuterol sulfate  (90 Base) MCG/ACT inhaler  Commonly known as:  VENTOLIN HFA     FIBER COMPLETE PO     ibuprofen 800 MG tablet  Commonly known as:  ADVIL;MOTRIN     simvastatin 20 MG tablet  Commonly known as:  ZOCOR        ASK your doctor about these medications    finasteride 5 MG tablet  Commonly known as:  PROSCAR     omeprazole 20 MG delayed release capsule  Commonly known as:  PRILOSEC           Where to Get Your Medications      These medications were sent to [id number] [place] [address], [address] [place] [address]    Phone:  [phone]   · lisinopril 20 MG tablet       Discharged in stable condition to home     Follow Up:   Follow up with PCP in 1 week     Anita Hernandez PA-C  6/20/2019 1:31 PM        .Demarco Urena MD 7/10/2019 8:13 AM

## 2019-06-20 NOTE — CONSULTS
[address] [facility]  [phone]        Reason for Consultation/Chief Complaint: \"I have been worn out and having high BP. \" Consulted for HTN    History of Present Illness:  Noreen Viveros is a 60 y.o. patient who presented to [facility] 6/19/19 with c/o fatigue and high BP. He  follows with me  for his cardiology care last OV  1/15/19. He has PMH HTN, HLD, GERD, rheumatoid arthritis, and enlarged prostate. Note remote C 2004 showed normal coronaries. Most recent ECHO 10/26/18 EF 55-60%; moderate concentric LVH, Mild MR, TR, CA. Most recent Lexiscan myoview stress test 10/26/18 Normal myocardial perfusion normal LV function and wall  motion. EF=55%. Most recent event monitor 11/13/18-12/12/18 showed NSR most of test avg HR 77 bpm, no pauses or arrhythmias tachycardia 11% and bradycardia 11% no afib detected. At last OV he reported random episodes of palpitations, SOB, and CP which prompted the non-invasive tested described previously. Now presents with feeling fatigued and \"run down\" last couple of days. Also minimal chest tightness constant. Wife reports home BP >200mmHg and he ran out of lisinopril for 3-4 days. Restarted just before admission. In the ED his EKG revealed NSR 62bmp (no change from 6/10/19 or 8/18 EKG)  Note CXR No active cardiopulmonary disease;  troponin neg x 3; elevated /110, 197/118mmHg. Patient with no c/o SOB, palpitations, dizziness, edema, or orthopnea/PND. I have been asked to provide consultation regarding further management and testing. Past Medical History:   has a past medical history of Acid reflux, Arthritis, Depression, Enlarged prostate, Hearing decreased, Hyperlipidemia, Hypertension, and Muscle pain. Surgical History:   has a past surgical history that includes Appendectomy; Wrist surgery; other surgical history (09/27/2012); Cardiac catheterization; Cystocopy; Colonoscopy (7/25/2013); and Upper gastrointestinal endoscopy (7/25/2013). Social History:  He is disabled, , 2 sons lives with his wife in [facility]. He  reports that he quit smoking about 20 years ago after smoking for about 20 yrs 5 ppd. He has a 30.00 pack-year smoking history. His smokeless tobacco use includes snuff 1 can dip every other day. He reports that he drinks about 6 pack per day  He reports that he has current  Marijuana use 3  joints per week     Family History: Dad  MI age 46, Mom  age 68 lung CA Bros MI CHF  Age 52's Sister CABG age 76  family history includes Cancer in his mother; Diabetes in his mother; Heart Disease in his father; High Blood Pressure in his father; Stroke in his mother. Home Medications:  Were reviewed and are listed in nursing record. and/or listed below  Prior to Admission medications    Medication Sig Start Date End Date Taking? Authorizing Provider   lisinopril (PRINIVIL;ZESTRIL) 10 MG tablet TAKE 1 TABLET BY MOUTH DAILY 19  Yes SATISH Cornelius CNP   finasteride (PROSCAR) 5 MG tablet Take 5 mg by mouth daily   Yes Historical Provider, MD   FLUoxetine (PROZAC) 40 MG capsule TAKE 1 CAPSULE BY MOUTH TWICE A DAY 19  Yes SATISH Cornelius CNP   hydrochlorothiazide (HYDRODIURIL) 25 MG tablet Take 1 tablet by mouth daily 1/15/19  Yes Adonis Flanagan MD   metoprolol succinate (TOPROL XL) 25 MG extended release tablet Take 1 tablet by mouth daily 1/15/19  Yes Adonis Flanagan MD   potassium chloride (KLOR-CON 10) 10 MEQ extended release tablet Take 1 tablet by mouth daily 18  Yes Genny Casas MD   sulindac (CLINORIL) 150 MG tablet TAKE 1 TABLET BY MOUTH 2 TIMES DAILY. 16  Yes Historical Provider, MD   terazosin (HYTRIN) 5 MG capsule TAKE 1 CAPSULE BY MOUTH NIGHTLY. 16  Yes Genny Casas MD   aspirin 81 MG chewable tablet Take 81 mg by mouth daily.    Yes Historical Provider, MD   omeprazole (PRILOSEC) 20 MG capsule Take 20 mg by mouth Daily    Yes Historical Provider, MD sildenafil (REVATIO) 20 MG tablet Take 20 mg by mouth as needed    Historical Provider, MD   Tocilizumab (ACTEMRA IV) Infuse  intravenously. Historical Provider, MD        Allergies:  Codeine and Pcn [penicillins]     Review of Systems:   All 14 point review of symptoms completed. Pertinent positives identified in the HPI, all other review of symptoms negative as below.     Physical Examination:    Vitals:    06/20/19 0432   BP: (!) 142/90   Pulse: 61   Resp: 16   Temp: 97.6 °F (36.4 °C)   SpO2: 96%    Weight: 200 lb 2 oz (90.8 kg)         General Appearance:  Alert, cooperative, no distress, appears stated age   Head:  Normocephalic, without obvious abnormality, atraumatic   Eyes:  PERRL, conjunctiva/corneas clear       Nose: Nares normal, no drainage or sinus tenderness   Throat: Lips, mucosa, and tongue normal   Neck: Supple, symmetrical, trachea midline, no adenopathy, thyroid: not enlarged, symmetric, no tenderness/mass/nodules, no carotid bruit or JVD       Lungs:   Clear to auscultation bilaterally, respirations unlabored   Chest Wall:  No tenderness or deformity   Heart:  Regular rate and rhythm, S1, S2 normal, no murmur, rub or gallop   Abdomen:   Soft, non-tender, bowel sounds active all four quadrants,  no masses, no organomegaly           Extremities: Extremities normal, atraumatic, no cyanosis or edema   Pulses: 2+ and symmetric   Skin: Skin color, texture, turgor normal, no rashes or lesions   Pysch: Normal mood and affect   Neurologic: Normal gross motor and sensory exam.         Labs  CBC:   Lab Results   Component Value Date    WBC 3.9 06/20/2019    RBC 3.67 06/20/2019    HGB 13.0 06/20/2019    HCT 37.5 06/20/2019    .2 06/20/2019    RDW 13.0 06/20/2019     06/20/2019     CMP:    Lab Results   Component Value Date     06/19/2019    K 4.2 06/19/2019     06/19/2019    CO2 24 06/19/2019    BUN 20 06/19/2019    CREATININE 0.9 06/19/2019    GFRAA >60 06/19/2019    GFRAA >60 2012    AGRATIO 1.7 2019    LABGLOM >60 2019    GLUCOSE 89 2019    PROT 7.6 2019    PROT 6.7 2012    PROT 6.7 2012    CALCIUM 9.9 2019    BILITOT 0.4 2019    ALKPHOS 49 2019    AST 29 2019    ALT 28 2019     PT/INR:  No results found for: PTINR  Lab Results   Component Value Date    CKTOTAL 24 (L) 2012    TROPONINI <0.01 2019       EK19  I have reviewed EKG with the following interpretation:  Impression: Normal sinus rhythmPossible Left atrial enlargementBorderline ECGNo previous ECGs availableConfirmed by CATIA Eason MD [id number]) on 2019 7:49:43 AM    EKG 19  Normal sinus rhythmNormal ECGWhen compared with ECG of 2019 17:09, (unconfirmed)No significant change was foundConfirmed by CATIA Eason MD ([id number]) on 2019 7:49:    CXR 19  No active cardiopulmonary disease   FINDINGS: Heart size and pulmonary vessels within normal limits. Lungs clear. Costophrenic angles sharp      ECHO 10/26/18 Summary   Normal left ventricular systolic function with an estimated EF of 55-60%.   No regional wall motion abnormalities are seen.  Gerlean Berlin is moderate concentric left ventricular hypertrophy.   Normal left ventricular diastolic filling pressure.   The aortic root is mildly dilated.   Mild mitral regurgitation.   Mild tricuspid regurgitation.   Normal systolic pulmonary artery pressure (SPAP) estimated at 25 mmHg (RA   pressure 3 mmHg).  Mild pulmonic regurgitation present. Lexiscan myoview stress test 10/26/18  Summary  Normal myocardial perfusion normal left ventricular function and wall  motion. The estimated left ventricular function is 55%. The left ventricular size is mildly dilated. event monitor 18-18   showed NSR most of test avg HR 77 bpm, no pauses or arrhythmias tachycardia 11% and bradycardia 11% no afib detected.     Assessment:   Lucy Mccarntey is a 60 y.o. patient who presented to [facility] 6/19/19 with c/o fatigue and high BP. He  follows with me  for his cardiology care last OV  1/15/19. He has PMH HTN, HLD, GERD, rheumatoid arthritis, and enlarged prostate. Note remote [facility] 2004 showed normal coronaries. Most recent ECHO 10/26/18 EF 55-60%; moderate concentric LVH, Mild MR, TR, WI. Most recent Lexiscan myoview stress test 10/26/18 Normal myocardial perfusion normal LV function and wall  motion. EF=55%. Most recent event monitor 11/13/18-12/12/18 showed NSR most of test avg HR 77 bpm, no pauses or arrhythmias tachycardia 11% and bradycardia 11% no afib detected. At last OV he reported random episodes of palpitations, SOB, and CP which prompted the non-invasive tested described previously. Now presents with feeling fatigued and \"run down\" last couple of days. Also minimal chest tightness constant. Wife reports home BP >200mmHg and he ran out of lisinopril for 3-4 days. Restarted just before admission. In the ED his EKG revealed NSR 62bmp (no change from 6/10/19 or 8/18 EKG)  Note CXR No active cardiopulmonary disease;  troponin neg x 3; elevated /110, 197/118mmHg. Diagnosis of HTN emergency in older male who ran out of lisinopril at home which likely caused clinical presentation. Mild unspecified CP likely due to HTN. Recs:  1. Increase lisinopril from 10 to 20mg daily for better BP control. 2. Note BP already improved and he feels much better. 3. Continue toprol XL 25mg qd, HCTZ 25mg qd, zocor 20mg qhs, and baby aspirin qd.  4. No need for cardiac testing at this time. 6. OK for d/c from cardiology later today if BP improved and feeling OK. He has f/u appt PCP in 3 weeks.        Patient Active Problem List   Diagnosis    Muscular weakness    Depression    Acid reflux    Hyperlipidemia    Hypertension    Muscle pain    Hearing decreased    Arthritis    Enlarged prostate    Chronic fatigue    Dizziness    Palpitations    Chest pain        Thank you

## 2019-06-21 ENCOUNTER — TELEPHONE (OUTPATIENT)
Dept: FAMILY MEDICINE CLINIC | Age: 64
End: 2019-06-21

## 2019-06-21 RX ORDER — AMLODIPINE BESYLATE 5 MG/1
5 TABLET ORAL DAILY
Qty: 90 TABLET | Refills: 3 | Status: SHIPPED | OUTPATIENT
Start: 2019-06-21 | End: 2019-07-11 | Stop reason: ALTCHOICE

## 2019-06-21 NOTE — TELEPHONE ENCOUNTER
Ana Cristina [facility] Transitions Initial Follow Up Call    Outreach made within 2 business days of discharge: Yes    Patient: Rodrigo Dominguez Patient : 1955   MRN: [id number]  Reason for Admission: There are no discharge diagnoses documented for the most recent discharge. Discharge Date: 19       Spoke with: wife    Discharge department/facility: 2019    TCM Interactive Patient Contact:  Was patient able to fill all prescriptions: Yes  Was patient instructed to bring all medications to the follow-up visit: Yes  Is patient taking all medications as directed in the discharge summary?  Yes  Does patient understand their discharge instructions: Yes  Does patient have questions or concerns that need addressed prior to 7-14 day follow up office visit: yes     Scheduled appointment with PCP within 7-14 days    Follow Up  Future Appointments   Date Time Provider Ney Vyas   2019 10:00 AM SATISH Lee - CNP [facility]   2019  1:45 PM Colin Flores MD [facility] ANNMARIE Hall MA

## 2019-06-21 NOTE — TELEPHONE ENCOUNTER
I spoke with the wife and his BP is high again. It is running 164/100.  They increased his Lisinopril to 20mg, please advise rc   He will see you 07/23/2019 [email or web address]

## 2019-06-21 NOTE — TELEPHONE ENCOUNTER
Wife called back, she said his bp is down to 110/76 and she did speak with his cardiologist who advised if bp stays up he is to take Amlodipine 5 mg which they sent in for him and he does have appt scheduled for 7/23 and cardiologist said that was fine

## 2019-06-21 NOTE — TELEPHONE ENCOUNTER
Pt's wife called. Nicholas Leal was  discharged from hospital yesterday. BP's have been elevated overnight and this morning ranging from 150's-180's/ 100-110. Nicholas Leal has a headache no other symptoms. Crissy Alfaro was advised if BP's remain elevated or any other symptoms or concern- go to ED. Hospital note 6/20/19  Recs:  1. Increase lisinopril from 10 to 20mg daily for better BP control. 2. Note BP already improved and he feels much better. 3. Continue toprol XL 25mg qd, HCTZ 25mg qd, zocor 20mg qhs, and baby aspirin qd.  4. No need for cardiac testing at this time. 6. OK for d/c from cardiology later today if BP improved and feeling OK. He has f/u appt PCP in 3 weeks.

## 2019-07-11 ENCOUNTER — TELEPHONE (OUTPATIENT)
Dept: CARDIOLOGY CLINIC | Age: 64
End: 2019-07-11

## 2019-07-18 ENCOUNTER — HOSPITAL ENCOUNTER (EMERGENCY)
Age: 64
Discharge: HOME | End: 2019-07-18
Attending: EMERGENCY MEDICINE
Payer: MEDICARE

## 2019-07-18 ENCOUNTER — TELEPHONE (OUTPATIENT)
Dept: CARDIOLOGY CLINIC | Age: 64
End: 2019-07-18

## 2019-07-18 ENCOUNTER — APPOINTMENT (OUTPATIENT)
Dept: GENERAL RADIOLOGY | Age: 64
End: 2019-07-18
Payer: MEDICARE

## 2019-07-18 VITALS
RESPIRATION RATE: 10 BRPM | OXYGEN SATURATION: 97 % | WEIGHT: 207 LBS | BODY MASS INDEX: 29.63 KG/M2 | DIASTOLIC BLOOD PRESSURE: 91 MMHG | HEIGHT: 70 IN | HEART RATE: 66 BPM | SYSTOLIC BLOOD PRESSURE: 146 MMHG | TEMPERATURE: 98.4 F

## 2019-07-18 DIAGNOSIS — I10 ASYMPTOMATIC HYPERTENSION: Primary | ICD-10-CM

## 2019-07-18 LAB
A/G RATIO: 1.6 (ref 1.1–2.2)
ALBUMIN SERPL-MCNC: 4.4 G/DL (ref 3.4–5)
ALP BLD-CCNC: 48 U/L (ref 40–129)
ALT SERPL-CCNC: 36 U/L (ref 10–40)
ANION GAP SERPL CALCULATED.3IONS-SCNC: 18 MMOL/L (ref 3–16)
AST SERPL-CCNC: 37 U/L (ref 15–37)
BASOPHILS ABSOLUTE: 0 K/UL (ref 0–0.2)
BASOPHILS RELATIVE PERCENT: 0.6 %
BILIRUB SERPL-MCNC: 0.5 MG/DL (ref 0–1)
BUN BLDV-MCNC: 19 MG/DL (ref 7–20)
CALCIUM SERPL-MCNC: 9.4 MG/DL (ref 8.3–10.6)
CHLORIDE BLD-SCNC: 97 MMOL/L (ref 99–110)
CO2: 19 MMOL/L (ref 21–32)
CREAT SERPL-MCNC: 0.9 MG/DL (ref 0.8–1.3)
EKG ATRIAL RATE: 67 BPM
EKG DIAGNOSIS: NORMAL
EKG P AXIS: 27 DEGREES
EKG P-R INTERVAL: 156 MS
EKG Q-T INTERVAL: 430 MS
EKG QRS DURATION: 84 MS
EKG QTC CALCULATION (BAZETT): 454 MS
EKG R AXIS: 44 DEGREES
EKG T AXIS: 70 DEGREES
EKG VENTRICULAR RATE: 67 BPM
EOSINOPHILS ABSOLUTE: 0.1 K/UL (ref 0–0.6)
EOSINOPHILS RELATIVE PERCENT: 2.7 %
GFR AFRICAN AMERICAN: >60
GFR NON-AFRICAN AMERICAN: >60
GLOBULIN: 2.8 G/DL
GLUCOSE BLD-MCNC: 224 MG/DL (ref 70–99)
HCT VFR BLD CALC: 38.3 % (ref 40.5–52.5)
HEMOGLOBIN: 12.9 G/DL (ref 13.5–17.5)
LYMPHOCYTES ABSOLUTE: 0.6 K/UL (ref 1–5.1)
LYMPHOCYTES RELATIVE PERCENT: 16.9 %
MCH RBC QN AUTO: 34.4 PG (ref 26–34)
MCHC RBC AUTO-ENTMCNC: 33.8 G/DL (ref 31–36)
MCV RBC AUTO: 102 FL (ref 80–100)
MONOCYTES ABSOLUTE: 0.1 K/UL (ref 0–1.3)
MONOCYTES RELATIVE PERCENT: 3.4 %
NEUTROPHILS ABSOLUTE: 2.8 K/UL (ref 1.7–7.7)
NEUTROPHILS RELATIVE PERCENT: 76.4 %
PDW BLD-RTO: 12.2 % (ref 12.4–15.4)
PLATELET # BLD: 180 K/UL (ref 135–450)
PMV BLD AUTO: 7.6 FL (ref 5–10.5)
POTASSIUM REFLEX MAGNESIUM: 4.1 MMOL/L (ref 3.5–5.1)
RBC # BLD: 3.76 M/UL (ref 4.2–5.9)
SODIUM BLD-SCNC: 134 MMOL/L (ref 136–145)
TOTAL PROTEIN: 7.2 G/DL (ref 6.4–8.2)
TROPONIN: <0.01 NG/ML
WBC # BLD: 3.7 K/UL (ref 4–11)

## 2019-07-18 PROCEDURE — 71046 X-RAY EXAM CHEST 2 VIEWS: CPT

## 2019-07-18 PROCEDURE — 93005 ELECTROCARDIOGRAM TRACING: CPT | Performed by: PHYSICIAN ASSISTANT

## 2019-07-18 PROCEDURE — 85025 COMPLETE CBC W/AUTO DIFF WBC: CPT

## 2019-07-18 PROCEDURE — 80053 COMPREHEN METABOLIC PANEL: CPT

## 2019-07-18 PROCEDURE — 84484 ASSAY OF TROPONIN QUANT: CPT

## 2019-07-18 PROCEDURE — 93010 ELECTROCARDIOGRAM REPORT: CPT | Performed by: INTERNAL MEDICINE

## 2019-07-18 PROCEDURE — 99283 EMERGENCY DEPT VISIT LOW MDM: CPT

## 2019-07-18 RX ORDER — AMLODIPINE BESYLATE 5 MG/1
5 TABLET ORAL DAILY
COMMUNITY
End: 2019-09-20 | Stop reason: SDUPTHER

## 2019-07-18 ASSESSMENT — ENCOUNTER SYMPTOMS
GASTROINTESTINAL NEGATIVE: 1
CHEST TIGHTNESS: 1

## 2019-07-18 ASSESSMENT — HEART SCORE: ECG: 0

## 2019-07-18 NOTE — ED PROVIDER NOTES
tablet Take 81 mg by mouth daily. omeprazole (PRILOSEC) 20 MG capsule Take 20 mg by mouth Daily Historical Med      Tocilizumab (ACTEMRA IV) Infuse intravenously Indications: once mth on next dose on  Historical Med               ALLERGIES     Codeine and Pcn [penicillins]    FAMILYHISTORY       Family History   Problem Relation Age of Onset    Cancer Mother         lung    Stroke Mother         at age 24   Hiram  Diabetes Mother     Heart Disease Father     High Blood Pressure Father           SOCIAL HISTORY       Social History     Socioeconomic History    Marital status:      Spouse name: None    Number of children: None    Years of education: None    Highest education level: None   Occupational History    None   Social Needs    Financial resource strain: None    Food insecurity:     Worry: None     Inability: None    Transportation needs:     Medical: None     Non-medical: None   Tobacco Use    Smoking status: Former Smoker     Packs/day: 2.00     Years: 15.00     Pack years: 30.00     Last attempt to quit: 1993     Years since quittin.0    Smokeless tobacco: Current User     Types: Snuff    Tobacco comment: chews daily   Substance and Sexual Activity    Alcohol use:  Yes     Alcohol/week: 30.0 - 35.0 standard drinks     Types: 30 - 35 Cans of beer per week     Comment: PER WIFE    Drug use: Yes     Types: Marijuana     Comment: couple times per wk    Sexual activity: Yes     Partners: Female   Lifestyle    Physical activity:     Days per week: None     Minutes per session: None    Stress: None   Relationships    Social connections:     Talks on phone: None     Gets together: None     Attends Druze service: None     Active member of club or organization: None     Attends meetings of clubs or organizations: None     Relationship status: None    Intimate partner violence:     Fear of current or ex partner: None     Emotionally abused: None     Physically abused: None Emergency Department Physician in the absence of a cardiologist.  Please see their note for interpretation of EKG. RADIOLOGY:   Non-plain film images such as CT, Ultrasound and MRI are read by the radiologist. Plain radiographic images are visualized andpreliminarily interpreted by the  ED Provider with the below findings:        Interpretation perthe Radiologist below, if available at the time of this note:    XR CHEST STANDARD (2 VW)   Final Result   1. No acute abnormality. No results found. PROCEDURES   Unless otherwise noted below, none     Procedures    CRITICAL CARE TIME   N/A    CONSULTS:  None      EMERGENCY DEPARTMENT COURSE and DIFFERENTIAL DIAGNOSIS/MDM:   Vitals:    Vitals:    07/18/19 1307 07/18/19 1333 07/18/19 1403 07/18/19 1404   BP: (!) 151/97 (!) 141/90 (!) 146/91    Pulse: 70 71 67 66   Resp: 21 12 17 10   Temp:       TempSrc:       SpO2: 95% 93% (!) 88% 97%   Weight:       Height:           Patient was given thefollowing medications:  Medications - No data to display    Patient brought in today for complaints of elevated blood pressure at home. On exam alert oriented afebrile breathing on room air satting 98%. Nontoxic appearing no acute respiratory distress. Old labs and records reviewed. Patient recently admitted for hypertension about 1 month ago and they have recently changed his medications. Patient switched now taking 20 of lisinopril along with metoprolol, amlodipine as needed. Patient states he did take 1 dose of amlodipine 5 mg this morning. Patient did have an echo done 1 month ago which showed an EF of 55 to 60%. He also had a negative stress test done in October 2018. Patient does follow with Dr. Ela Rosa with cardiology. Patient states that since he was admitted 1 month ago he has not followed up with cardiology in regards to the recent medication changes. Patient did have slightly elevated blood sugar at 225 today. Sodium of 134.   White count of

## 2019-07-23 ENCOUNTER — OFFICE VISIT (OUTPATIENT)
Dept: FAMILY MEDICINE CLINIC | Age: 64
End: 2019-07-23
Payer: MEDICARE

## 2019-07-23 VITALS
DIASTOLIC BLOOD PRESSURE: 94 MMHG | OXYGEN SATURATION: 96 % | BODY MASS INDEX: 29.82 KG/M2 | HEART RATE: 63 BPM | SYSTOLIC BLOOD PRESSURE: 169 MMHG | WEIGHT: 207.8 LBS

## 2019-07-23 DIAGNOSIS — E78.2 MIXED HYPERLIPIDEMIA: ICD-10-CM

## 2019-07-23 DIAGNOSIS — F33.42 RECURRENT MAJOR DEPRESSIVE DISORDER, IN FULL REMISSION (HCC): ICD-10-CM

## 2019-07-23 DIAGNOSIS — I10 ESSENTIAL HYPERTENSION: Primary | ICD-10-CM

## 2019-07-23 DIAGNOSIS — M06.9 RHEUMATOID ARTHRITIS INVOLVING MULTIPLE SITES, UNSPECIFIED RHEUMATOID FACTOR PRESENCE: ICD-10-CM

## 2019-07-23 LAB
ANION GAP SERPL CALCULATED.3IONS-SCNC: 14 MMOL/L (ref 3–16)
BASOPHILS ABSOLUTE: 0 K/UL (ref 0–0.2)
BASOPHILS RELATIVE PERCENT: 1.2 %
BUN BLDV-MCNC: 21 MG/DL (ref 7–20)
CALCIUM SERPL-MCNC: 9.2 MG/DL (ref 8.3–10.6)
CHLORIDE BLD-SCNC: 103 MMOL/L (ref 99–110)
CHOLESTEROL, TOTAL: 172 MG/DL (ref 0–199)
CO2: 22 MMOL/L (ref 21–32)
CREAT SERPL-MCNC: 0.7 MG/DL (ref 0.8–1.3)
EOSINOPHILS ABSOLUTE: 0.1 K/UL (ref 0–0.6)
EOSINOPHILS RELATIVE PERCENT: 2.8 %
GFR AFRICAN AMERICAN: >60
GFR NON-AFRICAN AMERICAN: >60
GLUCOSE BLD-MCNC: 91 MG/DL (ref 70–99)
HCT VFR BLD CALC: 37.6 % (ref 40.5–52.5)
HDLC SERPL-MCNC: 54 MG/DL (ref 40–60)
HEMOGLOBIN: 12.8 G/DL (ref 13.5–17.5)
LDL CHOLESTEROL CALCULATED: 96 MG/DL
LYMPHOCYTES ABSOLUTE: 0.8 K/UL (ref 1–5.1)
LYMPHOCYTES RELATIVE PERCENT: 21.1 %
MCH RBC QN AUTO: 34.9 PG (ref 26–34)
MCHC RBC AUTO-ENTMCNC: 34.1 G/DL (ref 31–36)
MCV RBC AUTO: 102.6 FL (ref 80–100)
MONOCYTES ABSOLUTE: 0.4 K/UL (ref 0–1.3)
MONOCYTES RELATIVE PERCENT: 11.2 %
NEUTROPHILS ABSOLUTE: 2.4 K/UL (ref 1.7–7.7)
NEUTROPHILS RELATIVE PERCENT: 63.7 %
PDW BLD-RTO: 12.5 % (ref 12.4–15.4)
PLATELET # BLD: 195 K/UL (ref 135–450)
PMV BLD AUTO: 8.3 FL (ref 5–10.5)
POTASSIUM SERPL-SCNC: 4.2 MMOL/L (ref 3.5–5.1)
RBC # BLD: 3.67 M/UL (ref 4.2–5.9)
SODIUM BLD-SCNC: 139 MMOL/L (ref 136–145)
TRIGL SERPL-MCNC: 111 MG/DL (ref 0–150)
VLDLC SERPL CALC-MCNC: 22 MG/DL
WBC # BLD: 3.7 K/UL (ref 4–11)

## 2019-07-23 PROCEDURE — 3017F COLORECTAL CA SCREEN DOC REV: CPT | Performed by: NURSE PRACTITIONER

## 2019-07-23 PROCEDURE — 99214 OFFICE O/P EST MOD 30 MIN: CPT | Performed by: NURSE PRACTITIONER

## 2019-07-23 PROCEDURE — G8419 CALC BMI OUT NRM PARAM NOF/U: HCPCS | Performed by: NURSE PRACTITIONER

## 2019-07-23 PROCEDURE — 4004F PT TOBACCO SCREEN RCVD TLK: CPT | Performed by: NURSE PRACTITIONER

## 2019-07-23 PROCEDURE — G8427 DOCREV CUR MEDS BY ELIG CLIN: HCPCS | Performed by: NURSE PRACTITIONER

## 2019-07-23 RX ORDER — FLUOXETINE HYDROCHLORIDE 40 MG/1
80 CAPSULE ORAL DAILY
Qty: 60 CAPSULE | Refills: 5 | Status: SHIPPED | OUTPATIENT
Start: 2019-07-23 | End: 2020-01-28

## 2019-07-23 RX ORDER — POTASSIUM CHLORIDE 750 MG/1
10 TABLET, FILM COATED, EXTENDED RELEASE ORAL DAILY
Qty: 90 TABLET | Refills: 3 | Status: SHIPPED | OUTPATIENT
Start: 2019-07-23 | End: 2020-07-14 | Stop reason: SDUPTHER

## 2019-07-23 RX ORDER — LISINOPRIL 20 MG/1
20 TABLET ORAL DAILY
Qty: 30 TABLET | Refills: 0 | Status: SHIPPED | OUTPATIENT
Start: 2019-07-23 | End: 2019-08-23 | Stop reason: SDUPTHER

## 2019-07-23 ASSESSMENT — ENCOUNTER SYMPTOMS
RESPIRATORY NEGATIVE: 1
ALLERGIC/IMMUNOLOGIC NEGATIVE: 1
EYES NEGATIVE: 1

## 2019-07-23 NOTE — PROGRESS NOTES
Years since quittin.0    Smokeless tobacco: Current User     Types: Snuff    Tobacco comment: chews daily   Substance Use Topics    Alcohol use: Yes     Alcohol/week: 30.0 - 35.0 standard drinks     Types: 30 - 35 Cans of beer per week     Comment: PER WIFE       Objective:   BP (!) 180/99   Pulse 63   Wt 207 lb 12.8 oz (94.3 kg)   SpO2 96%   BMI 29.82 kg/m²      Physical Exam   Constitutional: He is oriented to person, place, and time. He appears well-developed and well-nourished. He is cooperative. He does not have a sickly appearance. No distress. HENT:   Head: Normocephalic and atraumatic. Eyes: Conjunctivae and EOM are normal.   Neck: Normal range of motion. Neck supple. No thyromegaly present. Cardiovascular: Normal rate, regular rhythm, S1 normal, S2 normal, normal heart sounds and intact distal pulses. Exam reveals no gallop and no friction rub. No murmur heard. Pulmonary/Chest: Effort normal and breath sounds normal. No respiratory distress. Abdominal: Soft. Bowel sounds are normal.   Musculoskeletal: Normal range of motion. Neurological: He is oriented to person, place, and time. Coordination normal.   Skin: Skin is warm, dry and intact. No rash noted. Psychiatric: He has a normal mood and affect. His speech is normal and behavior is normal.       Assessment/Plan:   1. Essential hypertension  Patient presents today to follow-up on elevated blood pressure with associated dizziness and headaches. Patient has had high blood pressures over the past couple days however has also had some intermittently low blood pressures over the past 1 week. Amlodipine 5 mg by mouth daily was added on 2019 per patient/wife. Patient is also currently taking lisinopril 20 mg by mouth daily and Lopressor 25 mg daily. Exam is benign. Patient is following with cardiology regarding elevated blood pressures and had a hospital stay approximately 1 month ago.   Reviewed recent hospital records and

## 2019-07-23 NOTE — PATIENT INSTRUCTIONS
Please read the healthy family handout that you were given and share it with your family. Please compare this printed medication list with your medications at home to be sure they are the same. If you have any medications that are different please contact us immediately at [phone]. Also review your allergies that we have listed, these may also include medications that you have not been able to tolerate, make sure everything listed is correct. If you have any allergies that are different please contact us immediately at [phone]. Patient Education        DASH Diet: Care Instructions  Your Care Instructions    The DASH diet is an eating plan that can help lower your blood pressure. DASH stands for Dietary Approaches to Stop Hypertension. Hypertension is high blood pressure. The DASH diet focuses on eating foods that are high in calcium, potassium, and magnesium. These nutrients can lower blood pressure. The foods that are highest in these nutrients are fruits, vegetables, low-fat dairy products, nuts, seeds, and legumes. But taking calcium, potassium, and magnesium supplements instead of eating foods that are high in those nutrients does not have the same effect. The DASH diet also includes whole grains, fish, and poultry. The DASH diet is one of several lifestyle changes your doctor may recommend to lower your high blood pressure. Your doctor may also want you to decrease the amount of sodium in your diet. Lowering sodium while following the DASH diet can lower blood pressure even further than just the DASH diet alone. Follow-up care is a key part of your treatment and safety. Be sure to make and go to all appointments, and call your doctor if you are having problems. It's also a good idea to know your test results and keep a list of the medicines you take. How can you care for yourself at home? Following the DASH diet  · Eat 4 to 5 servings of fruit each day.  A serving is 1 medium-sized piece of

## 2019-07-24 DIAGNOSIS — D64.9 ANEMIA, UNSPECIFIED TYPE: Primary | ICD-10-CM

## 2019-07-24 DIAGNOSIS — D64.9 ANEMIA, UNSPECIFIED TYPE: ICD-10-CM

## 2019-07-24 LAB
FERRITIN: 152.9 NG/ML (ref 30–400)
FOLATE: 14.42 NG/ML (ref 4.78–24.2)
IRON SATURATION: 44 % (ref 20–50)
IRON: 157 UG/DL (ref 59–158)
TOTAL IRON BINDING CAPACITY: 353 UG/DL (ref 260–445)
VITAMIN B-12: 323 PG/ML (ref 211–911)

## 2019-08-01 RX ORDER — TERAZOSIN 5 MG/1
CAPSULE ORAL
Qty: 90 CAPSULE | Refills: 1 | Status: SHIPPED | OUTPATIENT
Start: 2019-08-01 | End: 2020-01-27

## 2019-08-23 DIAGNOSIS — E78.2 MIXED HYPERLIPIDEMIA: ICD-10-CM

## 2019-08-23 RX ORDER — LISINOPRIL 20 MG/1
TABLET ORAL
Qty: 30 TABLET | Refills: 0 | Status: SHIPPED | OUTPATIENT
Start: 2019-08-23 | End: 2019-09-20 | Stop reason: SDUPTHER

## 2019-08-23 NOTE — TELEPHONE ENCOUNTER
Future appt scheduled 01/08/2020  Last appt 07/23/2019    Last Written 07/23/2019  lisinopril (PRINIVIL;ZESTRIL) 20 MG tablet   #30  0 RF

## 2019-09-20 ENCOUNTER — OFFICE VISIT (OUTPATIENT)
Dept: CARDIOLOGY CLINIC | Age: 64
End: 2019-09-20
Payer: MEDICARE

## 2019-09-20 VITALS
DIASTOLIC BLOOD PRESSURE: 90 MMHG | WEIGHT: 211 LBS | OXYGEN SATURATION: 97 % | HEART RATE: 67 BPM | SYSTOLIC BLOOD PRESSURE: 120 MMHG | HEIGHT: 70 IN | BODY MASS INDEX: 30.21 KG/M2

## 2019-09-20 DIAGNOSIS — E78.2 MIXED HYPERLIPIDEMIA: ICD-10-CM

## 2019-09-20 DIAGNOSIS — I10 ESSENTIAL HYPERTENSION: Primary | ICD-10-CM

## 2019-09-20 PROCEDURE — 3017F COLORECTAL CA SCREEN DOC REV: CPT | Performed by: INTERNAL MEDICINE

## 2019-09-20 PROCEDURE — 99213 OFFICE O/P EST LOW 20 MIN: CPT | Performed by: INTERNAL MEDICINE

## 2019-09-20 PROCEDURE — G8417 CALC BMI ABV UP PARAM F/U: HCPCS | Performed by: INTERNAL MEDICINE

## 2019-09-20 PROCEDURE — 4004F PT TOBACCO SCREEN RCVD TLK: CPT | Performed by: INTERNAL MEDICINE

## 2019-09-20 PROCEDURE — G8427 DOCREV CUR MEDS BY ELIG CLIN: HCPCS | Performed by: INTERNAL MEDICINE

## 2019-09-20 RX ORDER — METOPROLOL SUCCINATE 25 MG/1
25 TABLET, EXTENDED RELEASE ORAL DAILY
Qty: 30 TABLET | Refills: 11 | Status: SHIPPED | OUTPATIENT
Start: 2019-09-20 | End: 2020-07-14 | Stop reason: SDUPTHER

## 2019-09-20 RX ORDER — SIMVASTATIN 20 MG
20 TABLET ORAL NIGHTLY
COMMUNITY
End: 2020-01-28 | Stop reason: SDUPTHER

## 2019-09-20 RX ORDER — LISINOPRIL 20 MG/1
TABLET ORAL
Qty: 30 TABLET | Refills: 11 | Status: SHIPPED | OUTPATIENT
Start: 2019-09-20 | End: 2020-07-14 | Stop reason: SDUPTHER

## 2019-09-20 RX ORDER — AMLODIPINE BESYLATE 5 MG/1
5 TABLET ORAL DAILY
Qty: 30 TABLET | Refills: 11 | Status: SHIPPED | OUTPATIENT
Start: 2019-09-20 | End: 2020-07-14 | Stop reason: SDUPTHER

## 2019-09-20 NOTE — LETTER
[facility]   Cardiac Consultation    Referring Provider:  SATISH Isabel CNP     Chief Complaint   Patient presents with    Follow-Up from Hospital     no cardiac complaints      Subjective:  Mr Master Etienne is being seen today for cardiology followup of HTN, HLD, palpitations; no complaints today    History of Present Illness:   Mr Sagastume Litter male here for routine cardiac f/u. He has PMH HTN, HLD, GERD, rheumatoid arthritis, and enlarged prostate. Note remote [facility] 2004 showed normal coronaries. Most recent ECHO 10/26/18 EF 55-60%. moderate concentric LVH, Mild MR, TR, KS. Most recent Lexiscan myoview stress test 10/26/18 Normal myocardial perfusion normal LV function and wall  motion. EF=55%. Most recent event monitor 11/13/18-12/12/18 showed NSR most of test avg HR 77 bpm, no pauses or arrhythmias tachycardia 11% and bradycardia 11% no afib detected. Admited [facility] 6/19/19 with fatigue and mild CP. Noted home BP >200mmHg after he ran out of lisinopril. In the ED his EKG revealed NSR 62bmp (no change from 6/10/19 or 8/18 EKG); negative Tyree x 3; elevated /110, 197/118mmHg. Note increased lisinopril from 10 to 20mg daily 6/21/19 added amlodipine 5 mg daily. Note on 7/11/19 HCTZ stopped due to orthostatic hypotension per Dr. Jules Geiger when I was off. Most recent EKG 7/18/19 Normal; NSR (no change from 8/18 EKG).    Today he reports feeling good. Reports home blood pressures have been 120/90 and below. Denies chest pain, shortness of breath, edema, dizziness, palpitations and syncope. 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 Taking all his medication without complication or side effects. Past Medical History:   has a past medical history of Acid reflux, Arthritis, Depression, Enlarged prostate, Hearing decreased, Hyperlipidemia, Hypertension, Muscle pain, and Rheumatoid arthritis ([facility] [address]      Surgical History: 10/26/18 EF 55-60%; moderate concentric LVH, Mild MR, TR, RI. Most recent Lexiscan myoview stress test 10/26/18 Normal myocardial perfusion normal wall motion and LVEF55%. Plan:  1. Meds reviewed. Refills as warranted   2. Continue current medication   3. Keep home blood pressure. goal 135/80 or less  4. Follow up with me in 6 months     This note was scribed in the presence of Marshall Puckett MD by Gale Ramos RN    Cost of prescription medications and patient compliance have been reviewed with patient. All questions answered. Thank you for allowing me to participate in the care of this individual.    This note was scribed in the presence of Marshall Puckett MD by Paty Patino RN    I, Dr. Sandy Kumar, personally performed the services described in this documentation, as scribed by the above signed scribe in my presence. It is both accurate and complete to my knowledge. I agree with the details independently gathered by the clinical support staff, while the remaining scribed note accurately describes my personal service to the patient. Guilherme Starr.  Eliu Mendoza M.D., [facility] [place]

## 2019-11-19 ENCOUNTER — OFFICE VISIT (OUTPATIENT)
Dept: FAMILY MEDICINE CLINIC | Age: 64
End: 2019-11-19
Payer: MEDICARE

## 2019-11-19 VITALS
OXYGEN SATURATION: 93 % | WEIGHT: 208.2 LBS | DIASTOLIC BLOOD PRESSURE: 98 MMHG | SYSTOLIC BLOOD PRESSURE: 161 MMHG | TEMPERATURE: 98.3 F | BODY MASS INDEX: 29.87 KG/M2 | HEART RATE: 75 BPM

## 2019-11-19 DIAGNOSIS — B96.89 ACUTE BACTERIAL BRONCHITIS: Primary | ICD-10-CM

## 2019-11-19 DIAGNOSIS — J20.8 ACUTE BACTERIAL BRONCHITIS: Primary | ICD-10-CM

## 2019-11-19 PROCEDURE — G8417 CALC BMI ABV UP PARAM F/U: HCPCS | Performed by: NURSE PRACTITIONER

## 2019-11-19 PROCEDURE — 99213 OFFICE O/P EST LOW 20 MIN: CPT | Performed by: NURSE PRACTITIONER

## 2019-11-19 PROCEDURE — 3017F COLORECTAL CA SCREEN DOC REV: CPT | Performed by: NURSE PRACTITIONER

## 2019-11-19 PROCEDURE — 4004F PT TOBACCO SCREEN RCVD TLK: CPT | Performed by: NURSE PRACTITIONER

## 2019-11-19 PROCEDURE — G8484 FLU IMMUNIZE NO ADMIN: HCPCS | Performed by: NURSE PRACTITIONER

## 2019-11-19 PROCEDURE — G8427 DOCREV CUR MEDS BY ELIG CLIN: HCPCS | Performed by: NURSE PRACTITIONER

## 2019-11-19 RX ORDER — AZITHROMYCIN 250 MG/1
250 TABLET, FILM COATED ORAL SEE ADMIN INSTRUCTIONS
Qty: 6 TABLET | Refills: 0 | Status: SHIPPED | OUTPATIENT
Start: 2019-11-19 | End: 2019-11-24

## 2019-11-19 RX ORDER — BENZONATATE 100 MG/1
100 CAPSULE ORAL 3 TIMES DAILY PRN
Qty: 30 CAPSULE | Refills: 0 | Status: SHIPPED | OUTPATIENT
Start: 2019-11-19 | End: 2019-11-26

## 2019-11-19 ASSESSMENT — ENCOUNTER SYMPTOMS
COUGH: 1
WHEEZING: 1
SORE THROAT: 0
GASTROINTESTINAL NEGATIVE: 1
SINUS PAIN: 0
EYES NEGATIVE: 1
SINUS PRESSURE: 0
SHORTNESS OF BREATH: 1

## 2020-01-15 ENCOUNTER — OFFICE VISIT (OUTPATIENT)
Dept: FAMILY MEDICINE CLINIC | Age: 65
End: 2020-01-15
Payer: MEDICARE

## 2020-01-15 VITALS
HEART RATE: 71 BPM | SYSTOLIC BLOOD PRESSURE: 146 MMHG | DIASTOLIC BLOOD PRESSURE: 83 MMHG | BODY MASS INDEX: 29.56 KG/M2 | OXYGEN SATURATION: 96 % | WEIGHT: 206 LBS

## 2020-01-15 LAB
A/G RATIO: 1.7 (ref 1.1–2.2)
ALBUMIN SERPL-MCNC: 4.7 G/DL (ref 3.4–5)
ALP BLD-CCNC: 51 U/L (ref 40–129)
ALT SERPL-CCNC: 44 U/L (ref 10–40)
ANION GAP SERPL CALCULATED.3IONS-SCNC: 14 MMOL/L (ref 3–16)
AST SERPL-CCNC: 52 U/L (ref 15–37)
BASOPHILS ABSOLUTE: 0 K/UL (ref 0–0.2)
BASOPHILS RELATIVE PERCENT: 0.5 %
BILIRUB SERPL-MCNC: 0.4 MG/DL (ref 0–1)
BUN BLDV-MCNC: 20 MG/DL (ref 7–20)
CALCIUM SERPL-MCNC: 9.3 MG/DL (ref 8.3–10.6)
CHLORIDE BLD-SCNC: 101 MMOL/L (ref 99–110)
CO2: 21 MMOL/L (ref 21–32)
CREAT SERPL-MCNC: 0.7 MG/DL (ref 0.8–1.3)
EOSINOPHILS ABSOLUTE: 0 K/UL (ref 0–0.6)
EOSINOPHILS RELATIVE PERCENT: 0.8 %
GFR AFRICAN AMERICAN: >60
GFR NON-AFRICAN AMERICAN: >60
GLOBULIN: 2.8 G/DL
GLUCOSE BLD-MCNC: 92 MG/DL (ref 70–99)
HCT VFR BLD CALC: 38.3 % (ref 40.5–52.5)
HEMOGLOBIN: 12.7 G/DL (ref 13.5–17.5)
LYMPHOCYTES ABSOLUTE: 1 K/UL (ref 1–5.1)
LYMPHOCYTES RELATIVE PERCENT: 21.2 %
MCH RBC QN AUTO: 34.3 PG (ref 26–34)
MCHC RBC AUTO-ENTMCNC: 33.3 G/DL (ref 31–36)
MCV RBC AUTO: 103.2 FL (ref 80–100)
MONOCYTES ABSOLUTE: 0.5 K/UL (ref 0–1.3)
MONOCYTES RELATIVE PERCENT: 9.8 %
NEUTROPHILS ABSOLUTE: 3.2 K/UL (ref 1.7–7.7)
NEUTROPHILS RELATIVE PERCENT: 67.7 %
PDW BLD-RTO: 13.7 % (ref 12.4–15.4)
PLATELET # BLD: 212 K/UL (ref 135–450)
PMV BLD AUTO: 8.4 FL (ref 5–10.5)
POTASSIUM SERPL-SCNC: 4 MMOL/L (ref 3.5–5.1)
PROSTATE SPECIFIC ANTIGEN: 0.26 NG/ML (ref 0–4)
RBC # BLD: 3.71 M/UL (ref 4.2–5.9)
SODIUM BLD-SCNC: 136 MMOL/L (ref 136–145)
TOTAL PROTEIN: 7.5 G/DL (ref 6.4–8.2)
WBC # BLD: 4.7 K/UL (ref 4–11)

## 2020-01-15 PROCEDURE — 99213 OFFICE O/P EST LOW 20 MIN: CPT | Performed by: NURSE PRACTITIONER

## 2020-01-15 PROCEDURE — G8427 DOCREV CUR MEDS BY ELIG CLIN: HCPCS | Performed by: NURSE PRACTITIONER

## 2020-01-15 PROCEDURE — G8484 FLU IMMUNIZE NO ADMIN: HCPCS | Performed by: NURSE PRACTITIONER

## 2020-01-15 PROCEDURE — G8417 CALC BMI ABV UP PARAM F/U: HCPCS | Performed by: NURSE PRACTITIONER

## 2020-01-15 PROCEDURE — 3017F COLORECTAL CA SCREEN DOC REV: CPT | Performed by: NURSE PRACTITIONER

## 2020-01-15 PROCEDURE — 4004F PT TOBACCO SCREEN RCVD TLK: CPT | Performed by: NURSE PRACTITIONER

## 2020-01-15 PROCEDURE — 36415 COLL VENOUS BLD VENIPUNCTURE: CPT | Performed by: NURSE PRACTITIONER

## 2020-01-15 ASSESSMENT — ENCOUNTER SYMPTOMS
EYES NEGATIVE: 1
GASTROINTESTINAL NEGATIVE: 1
RESPIRATORY NEGATIVE: 1

## 2020-01-15 NOTE — PATIENT INSTRUCTIONS
fruit, ½ cup chopped or canned fruit, 1/4 cup dried fruit, or 4 ounces (½ cup) of fruit juice. Choose fruit more often than fruit juice. · Eat 4 to 5 servings of vegetables each day. A serving is 1 cup of lettuce or raw leafy vegetables, ½ cup of chopped or cooked vegetables, or 4 ounces (½ cup) of vegetable juice. Choose vegetables more often than vegetable juice. · Get 2 to 3 servings of low-fat and fat-free dairy each day. A serving is 8 ounces of milk, 1 cup of yogurt, or 1 ½ ounces of cheese. · Eat 6 to 8 servings of grains each day. A serving is 1 slice of bread, 1 ounce of dry cereal, or ½ cup of cooked rice, pasta, or cooked cereal. Try to choose whole-grain products as much as possible. · Limit lean meat, poultry, and fish to 2 servings each day. A serving is 3 ounces, about the size of a deck of cards. · Eat 4 to 5 servings of nuts, seeds, and legumes (cooked dried beans, lentils, and split peas) each week. A serving is 1/3 cup of nuts, 2 tablespoons of seeds, or ½ cup of cooked beans or peas. · Limit fats and oils to 2 to 3 servings each day. A serving is 1 teaspoon of vegetable oil or 2 tablespoons of salad dressing. · Limit sweets and added sugars to 5 servings or less a week. A serving is 1 tablespoon jelly or jam, ½ cup sorbet, or 1 cup of lemonade. · Eat less than 2,300 milligrams (mg) of sodium a day. If you limit your sodium to 1,500 mg a day, you can lower your blood pressure even more. Tips for success  · Start small. Do not try to make dramatic changes to your diet all at once. You might feel that you are missing out on your favorite foods and then be more likely to not follow the plan. Make small changes, and stick with them. Once those changes become habit, add a few more changes. · Try some of the following:  ? Make it a goal to eat a fruit or vegetable at every meal and at snacks. This will make it easy to get the recommended amount of fruits and vegetables each day.   ? Try yogurt topped with fruit and nuts for a snack or healthy dessert. ? Add lettuce, tomato, cucumber, and onion to sandwiches. ? Combine a ready-made pizza crust with low-fat mozzarella cheese and lots of vegetable toppings. Try using tomatoes, squash, spinach, broccoli, carrots, cauliflower, and onions. ? Have a variety of cut-up vegetables with a low-fat dip as an appetizer instead of chips and dip. ? Sprinkle sunflower seeds or chopped almonds over salads. Or try adding chopped walnuts or almonds to cooked vegetables. ? Try some vegetarian meals using beans and peas. Add garbanzo or kidney beans to salads. Make burritos and tacos with mashed pereira beans or black beans. Where can you learn more? Go to https://[employer]. org and sign in to your [employer] account. Enter Z958 in the [employer] box to learn more about \"DASH Diet: Care Instructions. \"     If you do not have an account, please click on the \"Sign Up Now\" link. Current as of: April 9, 2019  Content Version: 12.3  © 2060-9061 Healthwise, [employer]. Care instructions adapted under license by [facility] ([facility]). If you have questions about a medical condition or this instruction, always ask your healthcare professional. [place] [address] any warranty or liability for your use of this information. Patient Education        High Blood Pressure: Care Instructions  Overview    It's normal for blood pressure to go up and down throughout the day. But if it stays up, you have high blood pressure. Another name for high blood pressure is hypertension. Despite what a lot of people think, high blood pressure usually doesn't cause headaches or make you feel dizzy or lightheaded. It usually has no symptoms. But it does increase your risk of stroke, heart attack, and other problems. You and your doctor will talk about your risks of these problems based on your blood pressure.   Your doctor will give you a goal for your blood pressure. Your goal will be based on your health and your age. Lifestyle changes, such as eating healthy and being active, are always important to help lower blood pressure. You might also take medicine to reach your blood pressure goal.  Follow-up care is a key part of your treatment and safety. Be sure to make and go to all appointments, and call your doctor if you are having problems. It's also a good idea to know your test results and keep a list of the medicines you take. How can you care for yourself at home? Medical treatment  · If you stop taking your medicine, your blood pressure will go back up. You may take one or more types of medicine to lower your blood pressure. Be safe with medicines. Take your medicine exactly as prescribed. Call your doctor if you think you are having a problem with your medicine. · Talk to your doctor before you start taking aspirin every day. Aspirin can help certain people lower their risk of a heart attack or stroke. But taking aspirin isn't right for everyone, because it can cause serious bleeding. · See your doctor regularly. You may need to see the doctor more often at first or until your blood pressure comes down. · If you are taking blood pressure medicine, talk to your doctor before you take decongestants or anti-inflammatory medicine, such as ibuprofen. Some of these medicines can raise blood pressure. · Learn how to check your blood pressure at home. Lifestyle changes  · Stay at a healthy weight. This is especially important if you put on weight around the waist. Losing even 10 pounds can help you lower your blood pressure. · If your doctor recommends it, get more exercise. Walking is a good choice. Bit by bit, increase the amount you walk every day. Try for at least 30 minutes on most days of the week. You also may want to swim, bike, or do other activities. · Avoid or limit alcohol. Talk to your doctor about whether you can drink any alcohol.   · Try to limit how much sodium you eat to less than 2,300 milligrams (mg) a day. Your doctor may ask you to try to eat less than 1,500 mg a day. · Eat plenty of fruits (such as bananas and oranges), vegetables, legumes, whole grains, and low-fat dairy products. · Lower the amount of saturated fat in your diet. Saturated fat is found in animal products such as milk, cheese, and meat. Limiting these foods may help you lose weight and also lower your risk for heart disease. · Do not smoke. Smoking increases your risk for heart attack and stroke. If you need help quitting, talk to your doctor about stop-smoking programs and medicines. These can increase your chances of quitting for good. When should you call for help? Call  911 anytime you think you may need emergency care. This may mean having symptoms that suggest that your blood pressure is causing a serious heart or blood vessel problem. Your blood pressure may be over 180/120.   For example, call  911 if:    · You have symptoms of a heart attack. These may include:  ? Chest pain or pressure, or a strange feeling in the chest.  ? Sweating. ? Shortness of breath. ? Nausea or vomiting. ? Pain, pressure, or a strange feeling in the back, neck, jaw, or upper belly or in one or both shoulders or arms. ? Lightheadedness or sudden weakness. ? A fast or irregular heartbeat.     · You have symptoms of a stroke. These may include:  ? Sudden numbness, tingling, weakness, or loss of movement in your face, arm, or leg, especially on only one side of your body. ? Sudden vision changes. ? Sudden trouble speaking. ? Sudden confusion or trouble understanding simple statements. ? Sudden problems with walking or balance. ? A sudden, severe headache that is different from past headaches.     · You have severe back or belly pain.    Do not wait until your blood pressure comes down on its own.  Get help right away.   Call your doctor now or seek immediate care if:    · Your blood pressure

## 2020-01-15 NOTE — PROGRESS NOTES
Comment: PER WIFE       Objective:   BP (!) 157/94   Pulse 71   Wt 206 lb (93.4 kg)   SpO2 96%   BMI 29.56 kg/m²     Vitals:    01/15/20 1100 01/15/20 1130   BP: (!) 157/94 (!) 146/83   Pulse: 71    SpO2: 96%    Weight: 206 lb (93.4 kg)      Physical Exam  Vitals signs and nursing note reviewed. Constitutional:       Appearance: Normal appearance. He is well-developed and well-groomed. He is not ill-appearing or toxic-appearing. HENT:      Head: Normocephalic and atraumatic. Eyes:      Conjunctiva/sclera: Conjunctivae normal.   Neck:      Musculoskeletal: Normal range of motion and neck supple. Cardiovascular:      Rate and Rhythm: Normal rate and regular rhythm. Pulses: Normal pulses. Heart sounds: Normal heart sounds, S1 normal and S2 normal. No murmur. No friction rub. No gallop. Pulmonary:      Effort: Pulmonary effort is normal.      Breath sounds: Normal breath sounds. No decreased breath sounds, wheezing, rhonchi or rales. Abdominal:      General: Bowel sounds are normal.      Palpations: Abdomen is soft. Musculoskeletal:      Comments: Full range of motion upper/lower extremities   Lymphadenopathy:      Cervical: No cervical adenopathy. Skin:     General: Skin is warm and dry. Capillary Refill: Capillary refill takes less than 2 seconds. Findings: No rash. Neurological:      Mental Status: He is alert and oriented to person, place, and time. Psychiatric:         Attention and Perception: Attention normal.         Mood and Affect: Mood normal.         Speech: Speech normal.         Behavior: Behavior normal. Behavior is cooperative. Thought Content: Thought content normal.         Assessment/Plan:   1. Rheumatoid arthritis involving multiple sites, unspecified rheumatoid factor presence ([facility] [address]  Patient presents today to follow-up on chronic conditions including rheumatoid arthritis, hypertension and hyperlipidemia.   Patient reports overall he is doing well and he denies any acute problems or concerns. Patient continues to take Tommas Pitcher as ordered and follows with rheumatology. Patient denies any current joint redness, swelling or pain. Patient reports mild stiffness in the morning that resolves within minutes of activity. 2. Essential hypertension  Blood pressure slightly elevated however repeat blood pressure improved. Patient does monitor blood pressure at home and reports blood pressure typically runs 140 over 80s. Patient reports cardiology is happy with his blood pressure. Recommend patient continue with current medication regimen and follow-up with cardiology as advised. Patient agreeable. Repeat labs as below. - CBC Auto Differential  - Comprehensive Metabolic Panel    3. Mixed hyperlipidemia  Patient continues to take simvastatin without any new myalgias or GI upset reviewed most recent lipids on 7/23/2019 which indicates good control with current treatment. Recommend patient continue with current medication regimen and will repeat lipids July/2020. 4. Prostate cancer screening  Patient follows with urology related to BPH/LUTS however reports he has not had a PSA level done for several years. PSA today.    - Psa screening

## 2020-01-16 ENCOUNTER — HOSPITAL ENCOUNTER (EMERGENCY)
Age: 65
Discharge: HOME | End: 2020-01-16
Payer: MEDICARE

## 2020-01-16 VITALS
OXYGEN SATURATION: 100 % | RESPIRATION RATE: 16 BRPM | HEIGHT: 70 IN | SYSTOLIC BLOOD PRESSURE: 152 MMHG | DIASTOLIC BLOOD PRESSURE: 86 MMHG | WEIGHT: 203 LBS | BODY MASS INDEX: 29.06 KG/M2 | TEMPERATURE: 98 F | HEART RATE: 80 BPM

## 2020-01-16 PROCEDURE — 12001 RPR S/N/AX/GEN/TRNK 2.5CM/<: CPT

## 2020-01-16 PROCEDURE — 99283 EMERGENCY DEPT VISIT LOW MDM: CPT

## 2020-01-16 RX ORDER — CEPHALEXIN 500 MG/1
500 CAPSULE ORAL 4 TIMES DAILY
Qty: 28 CAPSULE | Refills: 0 | Status: SHIPPED | OUTPATIENT
Start: 2020-01-16 | End: 2020-01-23

## 2020-01-16 ASSESSMENT — PAIN SCALES - GENERAL: PAINLEVEL_OUTOF10: 3

## 2020-01-16 ASSESSMENT — ENCOUNTER SYMPTOMS: COLOR CHANGE: 0

## 2020-01-16 NOTE — ED NOTES
Patient provided with discharge instructions and any prescriptions. Follow-up reviewed with patient/family. No further questions verbalized at this time. Vital signs and patient stable upon discharge.         Capri Dougherty RN  01/16/20 2893

## 2020-01-16 NOTE — ED PROVIDER NOTES
[address] [facility]        Pt Name: Jesus Cantu  MRN: [id number]  [place] 1955  Date of evaluation: 1/16/2020  Provider: Jesu Gifford PA-C  PCP: SATISH Lema - CNP    This patient was not seen and evaluated by the attending physician       [address] [place] [address]       Chief Complaint   Patient presents with    Hand Laceration     Pt cut finger on wood splinter. Left hand. HISTORY OF PRESENT ILLNESS   (Location/Symptom, Timing/Onset, Context/Setting, Quality, Duration, Modifying Factors, Severity)  Note limiting factors. Jesus Cantu is a 60 y.o. male patient was placing a board on equipment and cut his left ring finger on sharp metal edge. His finger was not smashed. Denies any numbness or weakness. States his tetanus is up-to-date. The history is provided by the patient and the spouse. Hand Problem   Location:  Finger  Finger location:  L ring finger  Injury: yes    Pain details:     Onset quality:  Sudden  Tetanus status:  Up to date  Associated symptoms: no decreased range of motion, no fever and no numbness      Nursing Notes were reviewed    REVIEW OF SYSTEMS    (2-9 systems for level 4, 10 or more for level 5)     Review of Systems   Constitutional: Negative for fever. Skin: Positive for wound. Negative for color change. Neurological: Negative for weakness and numbness. Positives and Pertinent negatives as per HPI.        PAST MEDICAL HISTORY     Past Medical History:   Diagnosis Date    Acid reflux     Arthritis     rheumatoid    Depression     Enlarged prostate     Hearing decreased     does not wear his hearing aid    Hyperlipidemia     no meds    Hypertension     Muscle pain     Rheumatoid arthritis ([address]          SURGICAL HISTORY     Past Surgical History:   Procedure Laterality Date    APPENDECTOMY      CARDIAC CATHETERIZATION      COLONOSCOPY  7/25/2013    polyps    CYSTOSCOPY      OTHER SURGICAL HISTORY  09/27/2012    muscle biopsy left shoulder    UPPER GASTROINTESTINAL ENDOSCOPY  7/25/2013    dysphagia    WRIST SURGERY      left         CURRENTMEDICATIONS       Discharge Medication List as of 1/16/2020  5:38 PM      CONTINUE these medications which have NOT CHANGED    Details   Tofacitinib Citrate (XELJANZ XR) 11 MG TB24 Take 11 mg by mouth dailyHistorical Med      simvastatin (ZOCOR) 20 MG tablet Take 20 mg by mouth nightlyHistorical Med      lisinopril (PRINIVIL;ZESTRIL) 20 MG tablet TAKE 1 TABLET BY MOUTH EVERY DAY, Disp-30 tablet, R-11Normal      amLODIPine (NORVASC) 5 MG tablet Take 1 tablet by mouth daily, Disp-30 tablet, R-11Normal      metoprolol succinate (TOPROL XL) 25 MG extended release tablet Take 1 tablet by mouth daily, Disp-30 tablet, R-11Normal      terazosin (HYTRIN) 5 MG capsule TAKE 1 CAPSULE BY MOUTH EVERY DAY, Disp-90 capsule, R-1Normal      potassium chloride (KLOR-CON 10) 10 MEQ extended release tablet Take 1 tablet by mouth daily, Disp-90 tablet, R-3Normal      FLUoxetine (PROZAC) 40 MG capsule Take 2 capsules by mouth daily, Disp-60 capsule, R-5Normal      finasteride (PROSCAR) 5 MG tablet Take 5 mg by mouth dailyHistorical Med      sulindac (CLINORIL) 150 MG tablet TAKE 1 TABLET BY MOUTH 2 TIMES DAILY., R-11      aspirin 81 MG chewable tablet Take 81 mg by mouth daily.       omeprazole (PRILOSEC) 20 MG capsule Take 20 mg by mouth Daily Historical Med               ALLERGIES     Codeine and Pcn [penicillins]    FAMILYHISTORY       Family History   Problem Relation Age of Onset    Cancer Mother         lung    Stroke Mother         at age 24   [place] Diabetes Mother     Heart Disease Father     High Blood Pressure Father           SOCIAL HISTORY       Social History     Socioeconomic History    Marital status:      Spouse name: None    Number of children: None    Years of education: None    Highest education level: None   Occupational History    None   Social anesthetic:  Bupivacaine 0.5% w/o epi (2cc)    Block injection procedure:  Anatomic landmarks identified    Block outcome:  Incomplete block  Laceration details:     Location:  Finger    Finger location:  L ring finger    Length (cm):  2    Depth (mm):  3  Repair type:     Repair type:  Simple  Pre-procedure details:     Preparation:  Patient was prepped and draped in usual sterile fashion  Exploration:     Wound exploration: entire depth of wound probed and visualized      Wound extent: no fascia violation noted, no foreign bodies/material noted, no nerve damage noted and no vascular damage noted      Contaminated: yes    Treatment:     Area cleansed with:  Hibiclens and saline    Amount of cleaning:  Standard    Irrigation solution:  Sterile saline  Skin repair:     Repair method:  Sutures    Suture size:  5-0    Suture material:  Prolene    Suture technique:  Simple interrupted    Number of sutures:  5  Approximation:     Approximation:  Close  Post-procedure details:     Dressing:  Antibiotic ointment and non-adherent dressing    Patient tolerance of procedure: Tolerated well, no immediate complications  Comments:      Wound extends to subcutaneous fatty tissue        CRITICAL CARE TIME   N/A    CONSULTS:  None      EMERGENCY DEPARTMENT COURSE and DIFFERENTIAL DIAGNOSIS/MDM:   Vitals:    Vitals:    01/16/20 1745   BP: (!) 152/86   Pulse: 80   Resp: 16   Temp: 98 °F (36.7 °C)   TempSrc: Oral   SpO2: 100%   Weight: 203 lb (92.1 kg)   Height: 5' 10\" (1.778 m)       Patient was given thefollowing medications:  Medications - No data to display    Wound was cleaned. Skin sutured. Suture mobile in 10 days. Discussed wound care at home. Prophylactic antibiotics. Advise returning to the ER for any worsening or signs of infection. Patient and wife understand and agree.     I estimate there is LOW risk for COMPARTMENT SYNDROME, TENDON OR NEUROVASCULAR INJURY, OR FOREIGN BODY, thus I consider the discharge disposition reasonable. Also, there is no evidence or peritonitis, sepsis, or toxicity. FINAL IMPRESSION      1. Laceration of left ring finger without foreign body without damage to nail, initial encounter          DISPOSITION/PLAN   DISPOSITION Decision to Discharge    PATIENT REFERREDTO:  [facility] Emergency Department  [address]  In 10 days  For suture removal    [facility] Emergency Department  [address] [facility] [id number] [phone]    If symptoms worsen      DISCHARGE MEDICATIONS:  Discharge Medication List as of 1/16/2020  5:38 PM      START taking these medications    Details   cephALEXin (KEFLEX) 500 MG capsule Take 1 capsule by mouth 4 times daily for 7 days, Disp-28 capsule, R-0Print             DISCONTINUED MEDICATIONS:  Discharge Medication List as of 1/16/2020  5:38 PM                 (Please note that portions ofthis note were completed with a voice recognition program.  Efforts were made to edit the dictations but occasionally words are mis-transcribed.)    Gretta Leon PA-C (electronically signed)            Jose Manuel Lopez PA-C  01/16/20 1946

## 2020-01-27 RX ORDER — TERAZOSIN 5 MG/1
CAPSULE ORAL
Qty: 90 CAPSULE | Refills: 1 | Status: SHIPPED | OUTPATIENT
Start: 2020-01-27 | End: 2020-07-14 | Stop reason: SDUPTHER

## 2020-01-28 ENCOUNTER — TELEPHONE (OUTPATIENT)
Dept: FAMILY MEDICINE CLINIC | Age: 65
End: 2020-01-28

## 2020-01-28 RX ORDER — FLUOXETINE HYDROCHLORIDE 40 MG/1
CAPSULE ORAL
Qty: 180 CAPSULE | Refills: 1 | Status: SHIPPED | OUTPATIENT
Start: 2020-01-28 | End: 2020-07-14 | Stop reason: SDUPTHER

## 2020-01-28 RX ORDER — SIMVASTATIN 20 MG
20 TABLET ORAL NIGHTLY
Qty: 30 TABLET | Refills: 11 | Status: SHIPPED | OUTPATIENT
Start: 2020-01-28 | End: 2020-07-14 | Stop reason: SDUPTHER

## 2020-01-28 NOTE — TELEPHONE ENCOUNTER
Patient has possible bronchitis. He has a script for Keflex at home that he did not get filled when he cut his finger a few months ago Would this help his congestion?

## 2020-01-28 NOTE — TELEPHONE ENCOUNTER
Usually do not need an antibiotic for bronchitis. Keflex can be used for upper respiratory infections. When there is an active infection it is recommend that immunosuppressive drugs be stopped during this time.

## 2020-07-14 ENCOUNTER — OFFICE VISIT (OUTPATIENT)
Dept: FAMILY MEDICINE CLINIC | Age: 65
End: 2020-07-14
Payer: MEDICARE

## 2020-07-14 VITALS
TEMPERATURE: 98.8 F | SYSTOLIC BLOOD PRESSURE: 138 MMHG | WEIGHT: 220.6 LBS | OXYGEN SATURATION: 95 % | HEART RATE: 72 BPM | BODY MASS INDEX: 31.65 KG/M2 | DIASTOLIC BLOOD PRESSURE: 80 MMHG

## 2020-07-14 LAB
A/G RATIO: 1.8 (ref 1.1–2.2)
ALBUMIN SERPL-MCNC: 4.5 G/DL (ref 3.4–5)
ALP BLD-CCNC: 54 U/L (ref 40–129)
ALT SERPL-CCNC: 41 U/L (ref 10–40)
ANION GAP SERPL CALCULATED.3IONS-SCNC: 15 MMOL/L (ref 3–16)
AST SERPL-CCNC: 41 U/L (ref 15–37)
BASOPHILS ABSOLUTE: 0 K/UL (ref 0–0.2)
BASOPHILS RELATIVE PERCENT: 0.6 %
BILIRUB SERPL-MCNC: <0.2 MG/DL (ref 0–1)
BUN BLDV-MCNC: 18 MG/DL (ref 7–20)
CALCIUM SERPL-MCNC: 9 MG/DL (ref 8.3–10.6)
CHLORIDE BLD-SCNC: 101 MMOL/L (ref 99–110)
CHOLESTEROL, TOTAL: 169 MG/DL (ref 0–199)
CO2: 20 MMOL/L (ref 21–32)
CREAT SERPL-MCNC: 0.9 MG/DL (ref 0.8–1.3)
EOSINOPHILS ABSOLUTE: 0.1 K/UL (ref 0–0.6)
EOSINOPHILS RELATIVE PERCENT: 1.8 %
FOLATE: >20 NG/ML (ref 4.78–24.2)
GFR AFRICAN AMERICAN: >60
GFR NON-AFRICAN AMERICAN: >60
GLOBULIN: 2.5 G/DL
GLUCOSE BLD-MCNC: 90 MG/DL (ref 70–99)
HCT VFR BLD CALC: 36.4 % (ref 40.5–52.5)
HDLC SERPL-MCNC: 47 MG/DL (ref 40–60)
HEMOGLOBIN: 12.3 G/DL (ref 13.5–17.5)
LDL CHOLESTEROL CALCULATED: 85 MG/DL
LYMPHOCYTES ABSOLUTE: 1.4 K/UL (ref 1–5.1)
LYMPHOCYTES RELATIVE PERCENT: 29.1 %
MCH RBC QN AUTO: 34.7 PG (ref 26–34)
MCHC RBC AUTO-ENTMCNC: 33.9 G/DL (ref 31–36)
MCV RBC AUTO: 102.2 FL (ref 80–100)
MONOCYTES ABSOLUTE: 0.6 K/UL (ref 0–1.3)
MONOCYTES RELATIVE PERCENT: 12.4 %
NEUTROPHILS ABSOLUTE: 2.7 K/UL (ref 1.7–7.7)
NEUTROPHILS RELATIVE PERCENT: 56.1 %
PDW BLD-RTO: 14 % (ref 12.4–15.4)
PLATELET # BLD: 221 K/UL (ref 135–450)
PMV BLD AUTO: 7.5 FL (ref 5–10.5)
POTASSIUM SERPL-SCNC: 4.4 MMOL/L (ref 3.5–5.1)
RBC # BLD: 3.56 M/UL (ref 4.2–5.9)
SODIUM BLD-SCNC: 136 MMOL/L (ref 136–145)
TOTAL PROTEIN: 7 G/DL (ref 6.4–8.2)
TRIGL SERPL-MCNC: 186 MG/DL (ref 0–150)
VITAMIN B-12: 341 PG/ML (ref 211–911)
VLDLC SERPL CALC-MCNC: 37 MG/DL
WBC # BLD: 4.8 K/UL (ref 4–11)

## 2020-07-14 PROCEDURE — G8427 DOCREV CUR MEDS BY ELIG CLIN: HCPCS | Performed by: NURSE PRACTITIONER

## 2020-07-14 PROCEDURE — 3017F COLORECTAL CA SCREEN DOC REV: CPT | Performed by: NURSE PRACTITIONER

## 2020-07-14 PROCEDURE — 4004F PT TOBACCO SCREEN RCVD TLK: CPT | Performed by: NURSE PRACTITIONER

## 2020-07-14 PROCEDURE — 99213 OFFICE O/P EST LOW 20 MIN: CPT | Performed by: NURSE PRACTITIONER

## 2020-07-14 PROCEDURE — 36415 COLL VENOUS BLD VENIPUNCTURE: CPT | Performed by: NURSE PRACTITIONER

## 2020-07-14 PROCEDURE — G8417 CALC BMI ABV UP PARAM F/U: HCPCS | Performed by: NURSE PRACTITIONER

## 2020-07-14 RX ORDER — FLUOXETINE HYDROCHLORIDE 40 MG/1
CAPSULE ORAL
Qty: 180 CAPSULE | Refills: 1 | Status: SHIPPED | OUTPATIENT
Start: 2020-07-14 | End: 2021-01-29

## 2020-07-14 RX ORDER — LISINOPRIL 20 MG/1
TABLET ORAL
Qty: 90 TABLET | Refills: 1 | Status: SHIPPED | OUTPATIENT
Start: 2020-07-14 | End: 2020-12-01

## 2020-07-14 RX ORDER — TERAZOSIN 5 MG/1
CAPSULE ORAL
Qty: 90 CAPSULE | Refills: 1 | Status: SHIPPED | OUTPATIENT
Start: 2020-07-14 | End: 2021-02-12 | Stop reason: SDUPTHER

## 2020-07-14 RX ORDER — AMLODIPINE BESYLATE 5 MG/1
5 TABLET ORAL DAILY
Qty: 90 TABLET | Refills: 1 | Status: SHIPPED | OUTPATIENT
Start: 2020-07-14 | End: 2020-12-16 | Stop reason: SDUPTHER

## 2020-07-14 RX ORDER — METOPROLOL SUCCINATE 25 MG/1
25 TABLET, EXTENDED RELEASE ORAL DAILY
Qty: 90 TABLET | Refills: 1 | Status: SHIPPED | OUTPATIENT
Start: 2020-07-14 | End: 2020-12-16 | Stop reason: SDUPTHER

## 2020-07-14 RX ORDER — SIMVASTATIN 20 MG
20 TABLET ORAL NIGHTLY
Qty: 90 TABLET | Refills: 1 | Status: SHIPPED | OUTPATIENT
Start: 2020-07-14 | End: 2020-12-16 | Stop reason: SDUPTHER

## 2020-07-14 RX ORDER — POTASSIUM CHLORIDE 750 MG/1
10 TABLET, FILM COATED, EXTENDED RELEASE ORAL DAILY
Qty: 90 TABLET | Refills: 1 | Status: SHIPPED | OUTPATIENT
Start: 2020-07-14 | End: 2020-12-16 | Stop reason: SDUPTHER

## 2020-07-14 NOTE — PROGRESS NOTES
Family History   Problem Relation Age of Onset   [facility] Cancer Mother         lung    Stroke Mother         at age 25    Diabetes Mother     Heart Disease Father     High Blood Pressure Father      Review of Systems   Constitutional: Negative for appetite change, chills and fever. HENT: Negative. Eyes: Negative. Respiratory: Negative. Cardiovascular: Negative. Gastrointestinal: Negative. Endocrine: Negative. Negative for polydipsia, polyphagia and polyuria. Genitourinary: Negative. Musculoskeletal: Positive for arthralgias. Skin: Negative. Allergic/Immunologic: Negative. Neurological: Negative. Hematological: Negative. Psychiatric/Behavioral: Negative for sleep disturbance.        Patient Active Problem List   Diagnosis    Muscular weakness    Depression    Acid reflux    Hyperlipidemia    Hypertension    Muscle pain    Hearing decreased    Arthritis    Enlarged prostate    Chronic fatigue    Dizziness    Palpitations    Rheumatoid arthritis ([facility]       Outpatient Medications Marked as Taking for the 7/14/20 encounter (Office Visit) with SATISH Dejesus CNP   Medication Sig Dispense Refill    FLUoxetine (PROZAC) 40 MG capsule TAKE 2 CAPSULES BY MOUTH EVERY  capsule 1    simvastatin (ZOCOR) 20 MG tablet Take 1 tablet by mouth nightly 30 tablet 11    terazosin (HYTRIN) 5 MG capsule TAKE 1 CAPSULE BY MOUTH EVERY DAY 90 capsule 1    Tofacitinib Citrate (XELJANZ XR) 11 MG TB24 Take 11 mg by mouth daily      lisinopril (PRINIVIL;ZESTRIL) 20 MG tablet TAKE 1 TABLET BY MOUTH EVERY DAY 30 tablet 11    amLODIPine (NORVASC) 5 MG tablet Take 1 tablet by mouth daily 30 tablet 11    metoprolol succinate (TOPROL XL) 25 MG extended release tablet Take 1 tablet by mouth daily 30 tablet 11    potassium chloride (KLOR-CON 10) 10 MEQ extended release tablet Take 1 tablet by mouth daily 90 tablet 3    finasteride (PROSCAR) 5 MG tablet Take 5 mg by mouth daily  sulindac (CLINORIL) 150 MG tablet TAKE 1 TABLET BY MOUTH 2 TIMES DAILY. 11    aspirin 81 MG chewable tablet Take 81 mg by mouth daily.  omeprazole (PRILOSEC) 20 MG capsule Take 20 mg by mouth Daily          Allergies   Allergen Reactions    Codeine      Not sure of reaction    Pcn [Penicillins]      Not sure of reaction       Social History     Tobacco Use    Smoking status: Former Smoker     Packs/day: 2.00     Years: 15.00     Pack years: 30.00     Last attempt to quit: 1993     Years since quittin.9    Smokeless tobacco: Current User     Types: Snuff    Tobacco comment: chews daily   Substance Use Topics    Alcohol use: Yes     Alcohol/week: 30.0 - 35.0 standard drinks     Types: 30 - 35 Cans of beer per week     Comment: PER WIFE       Objective:   /80   Pulse 72   Temp 98.8 °F (37.1 °C) (Infrared)   Wt 220 lb 9.6 oz (100.1 kg)   SpO2 95%   BMI 31.65 kg/m²     Physical Exam  Vitals signs and nursing note reviewed. Constitutional:       General: He is not in acute distress. Appearance: Normal appearance. He is well-developed. He is not ill-appearing or toxic-appearing. HENT:      Head: Normocephalic and atraumatic. Right Ear: Tympanic membrane, ear canal and external ear normal.      Left Ear: Tympanic membrane, ear canal and external ear normal.      Nose: Nose normal.      Mouth/Throat:      Lips: Pink. Mouth: Mucous membranes are moist.      Pharynx: Oropharynx is clear. Eyes:      Extraocular Movements: Extraocular movements intact. Conjunctiva/sclera: Conjunctivae normal.      Pupils: Pupils are equal, round, and reactive to light. Neck:      Musculoskeletal: Neck supple. Cardiovascular:      Rate and Rhythm: Normal rate and regular rhythm. Pulses: Normal pulses. Heart sounds: Normal heart sounds, S1 normal and S2 normal. Heart sounds not distant. No murmur. No friction rub. No gallop.     Pulmonary:      Effort: Pulmonary effort is normal. No accessory muscle usage or respiratory distress. Breath sounds: Normal breath sounds. No decreased breath sounds, wheezing, rhonchi or rales. Abdominal:      General: Bowel sounds are normal.      Palpations: Abdomen is soft. Lymphadenopathy:      Cervical: No cervical adenopathy. Skin:     General: Skin is warm and dry. Capillary Refill: Capillary refill takes less than 2 seconds. Findings: No rash. Neurological:      Mental Status: He is alert and oriented to person, place, and time. Psychiatric:         Attention and Perception: Attention normal.         Mood and Affect: Mood normal.         Speech: Speech normal.         Behavior: Behavior normal. Behavior is cooperative. Thought Content: Thought content normal.         Assessment/Plan:   1. Rheumatoid arthritis involving multiple sites, unspecified rheumatoid factor presence ([facility]  Reports is fairly well controlled with current treatment. Following with Rheumatology routinely. 2. Essential hypertension  Well controlled. - CBC Auto Differential  - Comprehensive Metabolic Panel  - potassium chloride (KLOR-CON 10) 10 MEQ extended release tablet; Take 1 tablet by mouth daily  Dispense: 90 tablet; Refill: 3    3. Mixed hyperlipidemia  Tolerating simvastatin with no new myalgias or GI upset. Repeat fasting lipids today. - Lipid Panel  - lisinopril (PRINIVIL;ZESTRIL) 20 MG tablet; TAKE 1 TABLET BY MOUTH EVERY DAY  Dispense: 30 tablet; Refill: 11    4. Recurrent major depressive disorder, in full remission ([facility]  Reports occasional irritability.   - FLUoxetine (PROZAC) 40 MG capsule; TAKE 2 CAPSULES BY MOUTH EVERY DAY  Dispense: 180 capsule; Refill: 1    5. Enlarged prostate  Stable. 6. Anemia, unspecified type  Monitor.   - Comprehensive Metabolic Panel  - Vitamin B12 & Folate    7.  Elevated glucose  Monitor.   - Hemoglobin A1C

## 2020-07-15 LAB
ESTIMATED AVERAGE GLUCOSE: 114 MG/DL
HBA1C MFR BLD: 5.6 %

## 2020-11-06 ENCOUNTER — APPOINTMENT (OUTPATIENT)
Dept: GENERAL RADIOLOGY | Age: 65
End: 2020-11-06
Payer: MEDICARE

## 2020-11-06 ENCOUNTER — HOSPITAL ENCOUNTER (EMERGENCY)
Age: 65
Discharge: HOME | End: 2020-11-06
Attending: EMERGENCY MEDICINE
Payer: MEDICARE

## 2020-11-06 VITALS
BODY MASS INDEX: 30.78 KG/M2 | HEIGHT: 70 IN | OXYGEN SATURATION: 98 % | WEIGHT: 215 LBS | DIASTOLIC BLOOD PRESSURE: 72 MMHG | HEART RATE: 88 BPM | RESPIRATION RATE: 18 BRPM | SYSTOLIC BLOOD PRESSURE: 148 MMHG | TEMPERATURE: 98.2 F

## 2020-11-06 LAB
BILIRUBIN URINE: NEGATIVE
BLOOD, URINE: NEGATIVE
CLARITY: CLEAR
COLOR: YELLOW
GLUCOSE URINE: NEGATIVE MG/DL
KETONES, URINE: NEGATIVE MG/DL
LEUKOCYTE ESTERASE, URINE: NEGATIVE
MICROSCOPIC EXAMINATION: NORMAL
NITRITE, URINE: NEGATIVE
PH UA: 6 (ref 5–8)
PROTEIN UA: NEGATIVE MG/DL
SPECIFIC GRAVITY UA: 1.01 (ref 1–1.03)
URINE TYPE: NORMAL
UROBILINOGEN, URINE: 0.2 E.U./DL

## 2020-11-06 PROCEDURE — 72170 X-RAY EXAM OF PELVIS: CPT

## 2020-11-06 PROCEDURE — 99284 EMERGENCY DEPT VISIT MOD MDM: CPT

## 2020-11-06 PROCEDURE — 81003 URINALYSIS AUTO W/O SCOPE: CPT

## 2020-11-06 RX ORDER — ONDANSETRON HYDROCHLORIDE 8 MG/1
8 TABLET, FILM COATED ORAL EVERY 8 HOURS PRN
Qty: 10 TABLET | Refills: 0 | Status: SHIPPED | OUTPATIENT
Start: 2020-11-06 | End: 2021-06-01

## 2020-11-06 RX ORDER — TAMSULOSIN HYDROCHLORIDE 0.4 MG/1
0.4 CAPSULE ORAL DAILY
COMMUNITY

## 2020-11-06 RX ORDER — METHOCARBAMOL 500 MG/1
500 TABLET, FILM COATED ORAL 4 TIMES DAILY
Qty: 20 TABLET | Refills: 0 | Status: SHIPPED | OUTPATIENT
Start: 2020-11-06 | End: 2020-11-11

## 2020-11-06 RX ORDER — IBUPROFEN 200 MG
600 TABLET ORAL EVERY 6 HOURS PRN
Qty: 50 TABLET | Refills: 0 | Status: SHIPPED | OUTPATIENT
Start: 2020-11-06 | End: 2021-06-01

## 2020-11-06 RX ORDER — OXYCODONE HYDROCHLORIDE AND ACETAMINOPHEN 5; 325 MG/1; MG/1
1 TABLET ORAL EVERY 6 HOURS PRN
Qty: 5 TABLET | Refills: 0 | Status: SHIPPED | OUTPATIENT
Start: 2020-11-06 | End: 2020-11-09

## 2020-11-06 ASSESSMENT — ENCOUNTER SYMPTOMS
BACK PAIN: 0
ABDOMINAL PAIN: 0
SHORTNESS OF BREATH: 0

## 2020-11-06 ASSESSMENT — PAIN - FUNCTIONAL ASSESSMENT: PAIN_FUNCTIONAL_ASSESSMENT: 0-10

## 2020-11-06 ASSESSMENT — PAIN SCALES - GENERAL: PAINLEVEL_OUTOF10: 4

## 2020-11-06 ASSESSMENT — PAIN DESCRIPTION - LOCATION: LOCATION: PELVIS

## 2020-11-06 NOTE — ED TRIAGE NOTES
Presents with c/o pelvis bone pain since yesterday after a fall from his pickup truck bed. C/o increased pain with walking. Using a cane for ambulation.

## 2020-11-06 NOTE — ED PROVIDER NOTES
[address] [facility]      Pt Name: Mane Nugent  MRN: [id number]  [place] 1955  Date of evaluation: 11/6/2020  Provider: Rahat Romano MD    [address] [place] [address]       Chief Complaint   Patient presents with    Pelvic Pain         HISTORY OF PRESENT ILLNESS   (Location/Symptom, Timing/Onset, Context/Setting, Quality, Duration, Modifying Factors, Severity)  Note limiting factors. Mane Nugent is a 63 y.o. male who presents to the emergency department     Patient presents with his wife saying that he fell yesterday and twisted his anterior pelvic area ever since then he has had some pain he denies nausea vomiting denies dysuria  Denies gross hematuria  Denies previous problems pain is kind of generalized over the groin on both sides there is no scrotal swelling no dysuria frequency no fever  No bleeding  I said it was a twisting action  Pelvic films were obtained which as noted below are negative    The history is provided by the patient and the spouse. Nursing Notes were reviewed. REVIEW OF SYSTEMS    (2-9 systems for level 4, 10 or more for level 5)     Review of Systems   Constitutional: Positive for activity change. Negative for chills and fever. Respiratory: Negative for shortness of breath. Cardiovascular: Negative for chest pain. Gastrointestinal: Negative for abdominal pain. Musculoskeletal: Positive for arthralgias, gait problem and myalgias. Negative for back pain and neck pain. Neurological: Negative for dizziness. Psychiatric/Behavioral: Negative for behavioral problems. All other systems reviewed and are negative. Except as noted above the remainder of the review of systems was reviewed and negative.        PAST MEDICAL HISTORY     Past Medical History:   Diagnosis Date    Acid reflux     Arthritis     rheumatoid    Depression     Enlarged prostate     Hearing decreased     does not wear his hearing aid    Hyperlipidemia     no meds    Hypertension     Muscle pain     Rheumatoid arthritis ([facility]          SURGICAL HISTORY       Past Surgical History:   Procedure Laterality Date    APPENDECTOMY      CARDIAC CATHETERIZATION      COLONOSCOPY  7/25/2013    polyps    CYSTOSCOPY      OTHER SURGICAL HISTORY  09/27/2012    muscle biopsy left shoulder    UPPER GASTROINTESTINAL ENDOSCOPY  7/25/2013    dysphagia    WRIST SURGERY      left         CURRENT MEDICATIONS       Previous Medications    AMLODIPINE (NORVASC) 5 MG TABLET    Take 1 tablet by mouth daily    ASPIRIN 81 MG CHEWABLE TABLET    Take 81 mg by mouth daily. FINASTERIDE (PROSCAR) 5 MG TABLET    Take 5 mg by mouth daily    FLUOXETINE (PROZAC) 40 MG CAPSULE    TAKE 2 CAPSULES BY MOUTH EVERY DAY    LISINOPRIL (PRINIVIL;ZESTRIL) 20 MG TABLET    TAKE 1 TABLET BY MOUTH EVERY DAY    METOPROLOL SUCCINATE (TOPROL XL) 25 MG EXTENDED RELEASE TABLET    Take 1 tablet by mouth daily    OMEPRAZOLE (PRILOSEC) 20 MG CAPSULE    Take 20 mg by mouth Daily     POTASSIUM CHLORIDE (KLOR-CON 10) 10 MEQ EXTENDED RELEASE TABLET    Take 1 tablet by mouth daily    SIMVASTATIN (ZOCOR) 20 MG TABLET    Take 1 tablet by mouth nightly    SULINDAC (CLINORIL) 150 MG TABLET    TAKE 1 TABLET BY MOUTH 2 TIMES DAILY.     TAMSULOSIN (FLOMAX) 0.4 MG CAPSULE    Take 0.4 mg by mouth daily    TERAZOSIN (HYTRIN) 5 MG CAPSULE    TAKE 1 CAPSULE BY MOUTH EVERY DAY    TOFACITINIB CITRATE (XELJANZ XR) 11 MG TB24    Take 11 mg by mouth daily       ALLERGIES     Codeine and Pcn [penicillins]    FAMILY HISTORY       Family History   Problem Relation Age of Onset    Cancer Mother         lung    Stroke Mother         at age 24   [place] Self Diabetes Mother     Heart Disease Father     High Blood Pressure Father           SOCIAL HISTORY       Social History     Socioeconomic History    Marital status:      Spouse name: None    Number of children: None    Years of education: None    Highest education level: None   Occupational History    None   Social Needs    Financial resource strain: None    Food insecurity     Worry: None     Inability: None    Transportation needs     Medical: None     Non-medical: None   Tobacco Use    Smoking status: Former Smoker     Packs/day: 2.00     Years: 15.00     Pack years: 30.00     Last attempt to quit: 1993     Years since quittin.3    Smokeless tobacco: Current User     Types: Snuff    Tobacco comment: chews daily   Substance and Sexual Activity    Alcohol use: Yes     Alcohol/week: 30.0 - 35.0 standard drinks     Types: 30 - 35 Cans of beer per week     Comment: PER WIFE    Drug use: Yes     Types: Marijuana     Comment: couple times per wk    Sexual activity: Yes     Partners: Female   Lifestyle    Physical activity     Days per week: None     Minutes per session: None    Stress: None   Relationships    Social connections     Talks on phone: None     Gets together: None     Attends [employer] service: None     Active member of club or organization: None     Attends meetings of clubs or organizations: None     Relationship status: None    Intimate partner violence     Fear of current or ex partner: None     Emotionally abused: None     Physically abused: None     Forced sexual activity: None   Other Topics Concern    None   Social History Narrative    None       SCREENINGS    [place] Coma Scale  Eye Opening: Spontaneous  Best Verbal Response: Oriented  Best Motor Response: Obeys commands  [place] Coma Scale Score: 15          PHYSICAL EXAM    (up to 7 for level 4, 8 or more for level 5)     ED Triage Vitals [20 1032]   BP Temp Temp Source Pulse Resp SpO2 Height Weight   (!) 163/87 98.2 °F (36.8 °C) Oral 88 18 98 % 5' 10\" (1.778 m) 215 lb (97.5 kg)       Physical Exam  Vitals signs and nursing note reviewed. Constitutional:       General: He is not in acute distress. Appearance: He is well-developed and normal weight.  He is not diaphoretic. HENT:      Head: Normocephalic. Eyes:      Conjunctiva/sclera: Conjunctivae normal.      Pupils: Pupils are equal, round, and reactive to light. Neck:      Musculoskeletal: Normal range of motion and neck supple. Thyroid: No thyromegaly. Cardiovascular:      Rate and Rhythm: Normal rate and regular rhythm. Heart sounds: Normal heart sounds. No murmur. No friction rub. No gallop. Pulmonary:      Effort: Pulmonary effort is normal. No respiratory distress. Breath sounds: Normal breath sounds. Abdominal:      General: Bowel sounds are normal. There is no distension. Palpations: Abdomen is soft. Tenderness: There is no abdominal tenderness. Musculoskeletal:         General: Tenderness and signs of injury present. No swelling. Right hip: He exhibits decreased range of motion and tenderness. Left hip: He exhibits decreased range of motion and tenderness. Legs:    Neurological:      Mental Status: He is alert and oriented to person, place, and time. GCS: GCS eye subscore is 4. GCS verbal subscore is 5. GCS motor subscore is 6. Cranial Nerves: No cranial nerve deficit. Sensory: No sensory deficit. Motor: No abnormal muscle tone. Coordination: Coordination normal.      Deep Tendon Reflexes: Reflexes normal.   Psychiatric:         Behavior: Behavior normal.         DIAGNOSTIC RESULTS     EKG: All EKG's are interpreted by the Emergency Department Physician who either signs or Co-signs this chart in the absence of a cardiologist.        RADIOLOGY:   Non-plain film images such as CT, Ultrasound and MRI are read by the radiologist. Plain radiographic images are visualized and preliminarily interpreted by the emergency physician with the below findings:        Interpretation per the Radiologist below, if available at the time of this note:    XR PELVIS (1-2 VIEWS)   Final Result   No acute fracture.       Nonspecific radiopaque foreign body overlying the left hip. LABS:  Results for orders placed or performed during the hospital encounter of 11/06/20   Urinalysis, reflex to microscopic   Result Value Ref Range    Color, UA Yellow Straw/Yellow    Clarity, UA Clear Clear    Glucose, Ur Negative Negative mg/dL    Bilirubin Urine Negative Negative    Ketones, Urine Negative Negative mg/dL    Specific Gravity, UA 1.015 1.005 - 1.030    Blood, Urine Negative Negative    pH, UA 6.0 5.0 - 8.0    Protein, UA Negative Negative mg/dL    Urobilinogen, Urine 0.2 <2.0 E.U./dL    Nitrite, Urine Negative Negative    Leukocyte Esterase, Urine Negative Negative    Microscopic Examination Not Indicated     Urine Type NotGiven             EMERGENCY DEPARTMENT COURSE and DIFFERENTIAL DIAGNOSIS/MDM:     Vitals:    11/06/20 1032   BP: (!) 163/87   Pulse: 88   Resp: 18   Temp: 98.2 °F (36.8 °C)   TempSrc: Oral   SpO2: 98%   Weight: 215 lb (97.5 kg)   Height: 5' 10\" (1.778 m)           MDM      REASSESSMENT          CRITICAL CARE TIME     CONSULTS:  None      PROCEDURES:     Procedures    MEDICATIONS GIVEN THIS VISIT:  Medications - No data to display     FINAL IMPRESSION      1.  Groin strain, unspecified laterality, initial encounter            DISPOSITION/PLAN   DISPOSITION Decision To Discharge 11/06/2020 11:56:54 AM      PATIENT REFERRED TO:  Yaniv Wall MD  [facility] [address]  [phone]    Schedule an appointment as soon as possible for a visit in 1 week  As needed      DISCHARGE MEDICATIONS:  New Prescriptions    IBUPROFEN (ADVIL) 200 MG TABLET    Take 3 tablets by mouth every 6 hours as needed for Pain Or simply direct to over-the-counter bottle    METHOCARBAMOL (ROBAXIN) 500 MG TABLET    Take 1 tablet by mouth 4 times daily for 20 doses    ONDANSETRON (ZOFRAN) 8 MG TABLET    Take 1 tablet by mouth every 8 hours as needed for Nausea    OXYCODONE-ACETAMINOPHEN (PERCOCET) 5-325 MG PER TABLET    Take 1 tablet by mouth every 6 hours as needed for Pain for up to 5 doses. Controlled Substances Monitoring  No flowsheet data found. (Please note that portions of this note were completed with a voice recognition program.  Efforts were made to edit the dictations but occasionally words are mis-transcribed.)    Patient was advised to return to the Emergency Department if there was any worsening.     Connor Burkett MD (electronically signed)  Attending Emergency Physician         Hood Delong MD  11/06/20 2467

## 2020-12-01 ENCOUNTER — OFFICE VISIT (OUTPATIENT)
Dept: FAMILY MEDICINE CLINIC | Age: 65
End: 2020-12-01
Payer: MEDICARE

## 2020-12-01 VITALS
HEART RATE: 84 BPM | SYSTOLIC BLOOD PRESSURE: 142 MMHG | TEMPERATURE: 98.3 F | OXYGEN SATURATION: 97 % | DIASTOLIC BLOOD PRESSURE: 86 MMHG | BODY MASS INDEX: 29.33 KG/M2 | WEIGHT: 204.4 LBS

## 2020-12-01 LAB
BASOPHILS ABSOLUTE: 0 K/UL (ref 0–0.2)
BASOPHILS RELATIVE PERCENT: 0.4 %
EOSINOPHILS ABSOLUTE: 0 K/UL (ref 0–0.6)
EOSINOPHILS RELATIVE PERCENT: 0.6 %
HCT VFR BLD CALC: 37.4 % (ref 40.5–52.5)
HEMOGLOBIN: 12.8 G/DL (ref 13.5–17.5)
LYMPHOCYTES ABSOLUTE: 0.7 K/UL (ref 1–5.1)
LYMPHOCYTES RELATIVE PERCENT: 12.6 %
MCH RBC QN AUTO: 34.9 PG (ref 26–34)
MCHC RBC AUTO-ENTMCNC: 34.3 G/DL (ref 31–36)
MCV RBC AUTO: 101.8 FL (ref 80–100)
MONOCYTES ABSOLUTE: 0.4 K/UL (ref 0–1.3)
MONOCYTES RELATIVE PERCENT: 8 %
NEUTROPHILS ABSOLUTE: 4.2 K/UL (ref 1.7–7.7)
NEUTROPHILS RELATIVE PERCENT: 78.4 %
PDW BLD-RTO: 14 % (ref 12.4–15.4)
PLATELET # BLD: 218 K/UL (ref 135–450)
PMV BLD AUTO: 7.7 FL (ref 5–10.5)
RBC # BLD: 3.67 M/UL (ref 4.2–5.9)
WBC # BLD: 5.4 K/UL (ref 4–11)

## 2020-12-01 PROCEDURE — 4004F PT TOBACCO SCREEN RCVD TLK: CPT | Performed by: NURSE PRACTITIONER

## 2020-12-01 PROCEDURE — 3017F COLORECTAL CA SCREEN DOC REV: CPT | Performed by: NURSE PRACTITIONER

## 2020-12-01 PROCEDURE — G0008 ADMIN INFLUENZA VIRUS VAC: HCPCS | Performed by: NURSE PRACTITIONER

## 2020-12-01 PROCEDURE — G8482 FLU IMMUNIZE ORDER/ADMIN: HCPCS | Performed by: NURSE PRACTITIONER

## 2020-12-01 PROCEDURE — G8417 CALC BMI ABV UP PARAM F/U: HCPCS | Performed by: NURSE PRACTITIONER

## 2020-12-01 PROCEDURE — 90688 IIV4 VACCINE SPLT 0.5 ML IM: CPT | Performed by: NURSE PRACTITIONER

## 2020-12-01 PROCEDURE — 36415 COLL VENOUS BLD VENIPUNCTURE: CPT | Performed by: NURSE PRACTITIONER

## 2020-12-01 PROCEDURE — 99214 OFFICE O/P EST MOD 30 MIN: CPT | Performed by: NURSE PRACTITIONER

## 2020-12-01 PROCEDURE — G8427 DOCREV CUR MEDS BY ELIG CLIN: HCPCS | Performed by: NURSE PRACTITIONER

## 2020-12-01 RX ORDER — DOXYCYCLINE HYCLATE 100 MG
100 TABLET ORAL 2 TIMES DAILY
Qty: 20 TABLET | Refills: 0 | Status: SHIPPED | OUTPATIENT
Start: 2020-12-01 | End: 2020-12-11

## 2020-12-01 RX ORDER — LISINOPRIL 30 MG/1
TABLET ORAL
Qty: 30 TABLET | Refills: 5 | Status: SHIPPED | OUTPATIENT
Start: 2020-12-01 | End: 2020-12-16 | Stop reason: SDUPTHER

## 2020-12-01 ASSESSMENT — ENCOUNTER SYMPTOMS
RESPIRATORY NEGATIVE: 1
GASTROINTESTINAL NEGATIVE: 1
SINUS PRESSURE: 1
SINUS PAIN: 1
EYES NEGATIVE: 1

## 2020-12-01 NOTE — PROGRESS NOTES
Vaccine Information Sheet, \"Influenza - Inactivated\"  given to Duane Saini, or parent/legal guardian of  Duane Saini and verbalized understanding. Patient responses:    Have you ever had a reaction to a flu vaccine? No  Do you have any current illness? No  Have you ever had Guillian Bozrah Syndrome? No  Do you have a serious allergy to any of the follow: Neomycin, Polymyxin, Thimerosal, eggs or egg products? No    Flu vaccine given per order. Please see immunization tab. Risks and benefits explained. Current VIS given.

## 2020-12-01 NOTE — PATIENT INSTRUCTIONS
cannot walk easily. · Have your vision and hearing checked each year or any time you notice a change. If you have trouble seeing and hearing, you might not be able to avoid objects and could lose your balance. · Know the side effects of the medicines you take. Ask your doctor or pharmacist whether the medicines you take can affect your balance. Sleeping pills or sedatives can affect your balance. · Limit the amount of alcohol you drink. Alcohol can impair your balance and other senses. · Ask your doctor whether calluses or corns on your feet need to be removed. If you wear loose-fitting shoes because of calluses or corns, you can lose your balance and fall. · Talk to your doctor if you have numbness in your feet. Preventing falls at home  · Remove raised doorway thresholds, throw rugs, and clutter. Repair loose carpet or raised areas in the floor. · Move furniture and electrical cords to keep them out of walking paths. · Use nonskid floor wax, and wipe up spills right away, especially on ceramic tile floors. · If you use a walker or cane, put rubber tips on it. If you use crutches, clean the bottoms of them regularly with an abrasive pad, such as steel wool. · Keep your house well lit, especially Alray Xiao, and outside walkways. Use night-lights in areas such as hallways and bathrooms. Add extra light switches or use remote switches (such as switches that go on or off when you clap your hands) to make it easier to turn lights on if you have to get up during the night. · Install sturdy handrails on stairways. · Move items in your cabinets so that the things you use a lot are on the lower shelves (about waist level). · Keep a cordless phone and a flashlight with new batteries by your bed. If possible, put a phone in each of the main rooms of your house, or carry a cell phone in case you fall and cannot reach a phone.  Or, you can wear a device around your neck or wrist. You push a button that sends a signal for help. · Wear low-heeled shoes that fit well and give your feet good support. Use footwear with nonskid soles. Check the heels and soles of your shoes for wear. Repair or replace worn heels or soles. · Do not wear socks without shoes on wood floors. · Walk on the grass when the sidewalks are slippery. If you live in an area that gets snow and ice in the winter, sprinkle salt on slippery steps and sidewalks. Preventing falls in the bath  · Install grab bars and nonskid mats inside and outside your shower or tub and near the toilet and sinks. · Use shower chairs and bath benches. · Use a hand-held shower head that will allow you to sit while showering. · Get into a tub or shower by putting the weaker leg in first. Get out of a tub or shower with your strong side first.  · Repair loose toilet seats and consider installing a raised toilet seat to make getting on and off the toilet easier. · Keep your bathroom door unlocked while you are in the shower. Where can you learn more? Go to https://Neighborland.[email or web address] org and sign in to your [employer] account. Enter [phone] in the [facility] box to learn more about \"Preventing Falls: Care Instructions. \"     If you do not have an account, please click on the \"Sign Up Now\" link. Current as of: April 15, 2020               Content Version: 12.6  © 6518-4308 [employer]. Care instructions adapted under license by [facility] ([facility]). If you have questions about a medical condition or this instruction, always ask your healthcare professional. [place] [address] any warranty or liability for your use of this information. Patient Education        Sinusitis: Care Instructions  Your Care Instructions     Sinusitis is an infection of the lining of the sinus cavities in your head. Sinusitis often follows a cold. It causes pain and pressure in your head and face.   In most cases, sinusitis gets better on its own in 1 to 2 weeks. But some mild symptoms may last for several weeks. Sometimes antibiotics are needed. Follow-up care is a key part of your treatment and safety. Be sure to make and go to all appointments, and call your doctor if you are having problems. It's also a good idea to know your test results and keep a list of the medicines you take. How can you care for yourself at home? · Take an over-the-counter pain medicine, such as acetaminophen (Tylenol), ibuprofen (Advil, Motrin), or naproxen (Aleve). Read and follow all instructions on the label. · If the doctor prescribed antibiotics, take them as directed. Do not stop taking them just because you feel better. You need to take the full course of antibiotics. · Be careful when taking over-the-counter cold or flu medicines and Tylenol at the same time. Many of these medicines have acetaminophen, which is Tylenol. Read the labels to make sure that you are not taking more than the recommended dose. Too much acetaminophen (Tylenol) can be harmful. · Breathe warm, moist air from a steamy shower, a hot bath, or a sink filled with hot water. Avoid cold, dry air. Using a humidifier in your home may help. Follow the directions for cleaning the machine. · Use saline (saltwater) nasal washes to help keep your nasal passages open and wash out mucus and bacteria. You can buy saline nose drops at a grocery store or drugstore. Or you can make your own at home by adding 1 teaspoon of salt and 1 teaspoon of baking soda to 2 cups of distilled water. If you make your own, fill a bulb syringe with the solution, insert the tip into your nostril, and squeeze gently. Simran Buerger your nose. · Put a hot, wet towel or a warm gel pack on your face 3 or 4 times a day for 5 to 10 minutes each time. · Try a decongestant nasal spray like oxymetazoline (Afrin). Do not use it for more than 3 days in a row. Using it for more than 3 days can make your congestion worse.   When should you call for help?   Call your doctor now or seek immediate medical care if:    · You have new or worse swelling or redness in your face or around your eyes.     · You have a new or higher fever. Watch closely for changes in your health, and be sure to contact your doctor if:    · You have new or worse facial pain.     · The mucus from your nose becomes thicker (like pus) or has new blood in it.     · You are not getting better as expected. Where can you learn more? Go to [email or web address] and sign in to your [employer] account. Enter V896 in the [employer] box to learn more about \"Sinusitis: Care Instructions. \"     If you do not have an account, please click on the \"Sign Up Now\" link. Current as of: April 15, 2020               Content Version: 12.6  © 7814-2272 [email or web address]. Care instructions adapted under license by [facility] ([facility]). If you have questions about a medical condition or this instruction, always ask your healthcare professional. [place] [address] any warranty or liability for your use of this information. Patient Education        doxycycline (oral/injection)  Pronunciation:  DOX madie love  Brand:  Acticlate, Adoxa, Alodox, Avidoxy, Doryx, Mondoxyne NL, Monodox, Morgidox, Oracea, Oraxyl, Targadox, Vibramycin  What is the most important information I should know about doxycycline? You should not take this medicine if you are allergic to any tetracycline antibiotic. Children younger than 6years old should use doxycycline only in cases of severe or life-threatening conditions. This medicine can cause permanent yellowing or graying of the teeth in children  Using doxycycline during pregnancy could harm the unborn baby or cause permanent tooth discoloration later in the baby's life. What is doxycycline? Doxycycline is a tetracycline antibiotic that fights bacteria in the body.   Doxycycline is used to treat many different bacterial cold.  Store at room temperature away from moisture, heat, and light. Throw away any unused medicine after the expiration date on the label has passed. Using  doxycycline can cause damage to your kidneys. What happens if I miss a dose? Take the medicine as soon as you can, but skip the missed dose if it is almost time for your next dose. Do not take two doses at one time. What happens if I overdose? Seek emergency medical attention or call the Poison Help line at 1-124.633.8992. What should I avoid while taking doxycycline? Do not take iron supplements, multivitamins, calcium supplements, antacids, or laxatives within 2 hours before or after taking doxycycline. Avoid taking any other antibiotics with doxycycline unless your doctor has told you to. Doxycycline could make you sunburn more easily. Avoid sunlight or tanning beds. Wear protective clothing and use sunscreen (SPF 30 or higher) when you are outdoors. Antibiotic medicines can cause diarrhea, which may be a sign of a new infection. If you have diarrhea that is watery or bloody, call your doctor. Do not use anti-diarrhea medicine unless your doctor tells you to. What are the possible side effects of doxycycline? Get emergency medical help if you have signs of an allergic reaction (hives, difficult breathing, swelling in your face or throat) or a severe skin reaction (fever, sore throat, burning in your eyes, skin pain, red or purple skin rash that spreads and causes blistering and peeling). Seek medical treatment if you have a serious drug reaction that can affect many parts of your body. Symptoms may include: skin rash, fever, swollen glands, flu-like symptoms, muscle aches, severe weakness, unusual bruising, or yellowing of your skin or eyes. This reaction may occur several weeks after you began using doxycycline.   Call your doctor at once if you have:  · severe stomach pain, diarrhea that is watery or bloody;  · throat irritation, trouble swallowing;  · chest pain, irregular heart rhythm, feeling short of breath;  · little or no urination;  · low white blood cell counts --fever, chills, swollen glands, body aches, weakness, pale skin, easy bruising or bleeding;  · increased pressure inside the skull --severe headaches, ringing in your ears, dizziness, nausea, vision problems, pain behind your eyes; or  · signs of liver or pancreas problems --loss of appetite, upper stomach pain (that may spread to your back), tiredness, nausea or vomiting, fast heart rate, dark urine, jaundice (yellowing of the skin or eyes). Common side effects may include:  · nausea, vomiting, upset stomach, loss of appetite;  · mild diarrhea;  · skin rash or itching;  · darkened skin color; or  · vaginal itching or discharge. This is not a complete list of side effects and others may occur. Call your doctor for medical advice about side effects. You may report side effects to FDA at 1-644-FDA-1789. What other drugs will affect doxycycline? Sometimes it is not safe to use certain medications at the same time. Some drugs can affect your blood levels of other drugs you take, which may increase side effects or make the medications less effective. Other drugs may affect doxycycline, including prescription and over-the-counter medicines, vitamins, and herbal products. Tell your doctor about all your current medicines and any medicine you start or stop using. Where can I get more information? Your pharmacist can provide more information about doxycycline. Remember, keep this and all other medicines out of the reach of children, never share your medicines with others, and use this medication only for the indication prescribed. Every effort has been made to ensure that the information provided by [address] is accurate, up-to-date, and complete, but no guarantee is made to that effect. Drug information contained herein may be time sensitive.  Jose information has been compiled for use by healthcare practitioners and consumers in the [place] and therefore [facility] does not warrant that uses outside of the [place] are appropriate, unless specifically indicated otherwise. [facility]'s drug information does not endorse drugs, diagnose patients or recommend therapy. [facility]'s drug information is an informational resource designed to assist licensed healthcare practitioners in caring for their patients and/or to serve consumers viewing this service as a supplement to, and not a substitute for, the expertise, skill, knowledge and judgment of healthcare practitioners. The absence of a warning for a given drug or drug combination in no way should be construed to indicate that the drug or drug combination is safe, effective or appropriate for any given patient. [facility] does not assume any responsibility for any aspect of healthcare administered with the aid of information [facility] provides. The information contained herein is not intended to cover all possible uses, directions, precautions, warnings, drug interactions, allergic reactions, or adverse effects. If you have questions about the drugs you are taking, check with your doctor, nurse or pharmacist.  Copyright 1236-7589 [address] [place] [address] Dr TIAN. Version: 21.03. Revision date: 2/18/2020. Care instructions adapted under license by [facility] ([facility]). If you have questions about a medical condition or this instruction, always ask your healthcare professional. [address] any warranty or liability for your use of this information.

## 2020-12-01 NOTE — PROGRESS NOTES
Subjective:      Patient ID: Marcelo Wilson is a 63 y.o. male. HPI    Chief Complaint   Patient presents with    Other     f/u on a fall-3 weeks ago     Patient states he was stepping up into the bed of his truck, missed the bumper and fell onto right side/back onto concrete approximately 3 weeks ago. Patient was evaluated at [address] ED and did have Pelvic x-ray that did not show any acute fractures. Patient reports he continues to have right pelvic pain that is constant and is aggravated by lifting, walking. Patient reports no improvement in pain since fall. Patient denies any changes in urination. Patient has history of BPH and does follow with Urology (Dr. Jadiel Tovar). Hypertension:  Home blood pressure monitoring: Yes - 130-160 over 70-90's. He is not adherent to a low sodium diet. Patient denies chest pain, shortness of breath, headache, lightheadedness, blurred vision, peripheral edema, palpitations, dry cough and fatigue. Antihypertensive medication side effects: no medication side effects noted. Use of agents associated with hypertension: none. Sodium (mmol/L)   Date Value   07/14/2020 136    BUN (mg/dL)   Date Value   07/14/2020 18    Glucose (mg/dL)   Date Value   07/14/2020 90      Potassium (mmol/L)   Date Value   07/14/2020 4.4     Potassium reflex Magnesium (mmol/L)   Date Value   07/18/2019 4.1    CREATININE (mg/dL)   Date Value   07/14/2020 0.9           Sinus Pain  Patient complains of congestion, nasal congestion, post nasal drip and sinus pressure. Onset of symptoms was over 1 month ago. Symptoms have been unchanged since that time. He is drinking plenty of fluids. Past history is significant for nothing. Patient is non-smoker.     Past Medical History:   Diagnosis Date    Acid reflux     Arthritis     rheumatoid    Depression     Enlarged prostate     Hearing decreased     does not wear his hearing aid    Hyperlipidemia     no meds    Hypertension     Muscle pain     Rheumatoid arthritis [facility])      Past Surgical History:   Procedure Laterality Date    APPENDECTOMY      CARDIAC CATHETERIZATION      COLONOSCOPY  7/25/2013    polyps    CYSTOSCOPY      OTHER SURGICAL HISTORY  09/27/2012    muscle biopsy left shoulder    UPPER GASTROINTESTINAL ENDOSCOPY  7/25/2013    dysphagia    WRIST SURGERY      left     Family History   Problem Relation Age of Onset    Cancer Mother         lung    Stroke Mother         at age 24   Ozzy Spina Diabetes Mother     Heart Disease Father     High Blood Pressure Father      Review of Systems   Constitutional: Negative for appetite change, chills and fever. HENT: Positive for congestion, postnasal drip, sinus pressure and sinus pain. Eyes: Negative. Respiratory: Negative. Cardiovascular: Negative. Gastrointestinal: Negative. Endocrine: Negative. Musculoskeletal: Positive for arthralgias. Pelvic pain   Skin: Negative. Allergic/Immunologic: Positive for environmental allergies. Neurological: Negative. Psychiatric/Behavioral: Negative.         Patient Active Problem List   Diagnosis    Muscular weakness    Depression    Acid reflux    Hyperlipidemia    Hypertension    Muscle pain    Hearing decreased    Arthritis    Enlarged prostate    Chronic fatigue    Dizziness    Palpitations    Rheumatoid arthritis ([facility] [address]       Outpatient Medications Marked as Taking for the 12/1/20 encounter (Office Visit) with SATISH Vieira CNP   Medication Sig Dispense Refill    tamsulosin (FLOMAX) 0.4 MG capsule Take 0.4 mg by mouth daily      ibuprofen (ADVIL) 200 MG tablet Take 3 tablets by mouth every 6 hours as needed for Pain Or simply direct to over-the-counter bottle 50 tablet 0    ondansetron (ZOFRAN) 8 MG tablet Take 1 tablet by mouth every 8 hours as needed for Nausea 10 tablet 0    FLUoxetine (PROZAC) 40 MG capsule TAKE 2 CAPSULES BY MOUTH EVERY  capsule 1    simvastatin (ZOCOR) 20 MG tablet Take 1 tablet by mouth nightly 90 tablet 1    terazosin (HYTRIN) 5 MG capsule TAKE 1 CAPSULE BY MOUTH EVERY DAY 90 capsule 1    lisinopril (PRINIVIL;ZESTRIL) 20 MG tablet TAKE 1 TABLET BY MOUTH EVERY DAY 90 tablet 1    amLODIPine (NORVASC) 5 MG tablet Take 1 tablet by mouth daily 90 tablet 1    metoprolol succinate (TOPROL XL) 25 MG extended release tablet Take 1 tablet by mouth daily 90 tablet 1    potassium chloride (KLOR-CON 10) 10 MEQ extended release tablet Take 1 tablet by mouth daily 90 tablet 1    Tofacitinib Citrate (XELJANZ XR) 11 MG TB24 Take 11 mg by mouth daily      finasteride (PROSCAR) 5 MG tablet Take 5 mg by mouth daily      sulindac (CLINORIL) 150 MG tablet TAKE 1 TABLET BY MOUTH 2 TIMES DAILY. 11    aspirin 81 MG chewable tablet Take 81 mg by mouth daily.  omeprazole (PRILOSEC) 20 MG capsule Take 20 mg by mouth Daily          Allergies   Allergen Reactions    Codeine      Not sure of reaction    Pcn [Penicillins]      Not sure of reaction       Social History     Tobacco Use    Smoking status: Former Smoker     Packs/day: 2.00     Years: 15.00     Pack years: 30.00     Last attempt to quit: 1993     Years since quittin.3    Smokeless tobacco: Current User     Types: Snuff    Tobacco comment: chews daily   Substance Use Topics    Alcohol use: Yes     Alcohol/week: 30.0 - 35.0 standard drinks     Types: 30 - 35 Cans of beer per week     Comment: PER WIFE       Objective:   BP (!) 159/91   Pulse 84   Temp 98.3 °F (36.8 °C) (Oral)   Wt 204 lb 6.4 oz (92.7 kg)   SpO2 97%   BMI 29.33 kg/m²     Physical Exam  Vitals signs and nursing note reviewed. Constitutional:       General: He is not in acute distress. Appearance: Normal appearance. He is well-developed and well-groomed. He is not ill-appearing or toxic-appearing. HENT:      Head: Normocephalic and atraumatic.       Nose:      Right Sinus: Maxillary sinus tenderness and possible pelvic fracture that may not have shown up on x-ray. Recommend MRI as below. Patient agreeable. - MRI PELVIS WO CONTRAST; Future    2. History of recent fall  See #1  - MRI PELVIS WO CONTRAST; Future    3. Acute bacterial sinusitis  Patient with a 1 month history of nasal congestion, sinus pain and pressure and postnasal drip. Given duration of symptoms recommend treatment as below. Advised to drink plenty of fluids, take medication as prescribed patient to follow-up if no better worsening of symptoms. Patient verbalized understanding agreeable to plan. - doxycycline hyclate (VIBRA-TABS) 100 MG tablet; Take 1 tablet by mouth 2 times daily for 10 days  Dispense: 20 tablet; Refill: 0    4. Essential hypertension  Elevated and patient admits his blood pressure has been elevated at home. Recommend increasing lisinopril to 30 mg by mouth daily. Advised to monitor blood pressure daily, keep a log and call if blood pressures persistently greater than 138/88. Patient/wife verbalized understanding agreeable to plan. - CBC Auto Differential  - Comprehensive Metabolic Panel  - lisinopril (PRINIVIL;ZESTRIL) 30 MG tablet; TAKE 1 TABLET BY MOUTH EVERY DAY  Dispense: 30 tablet; Refill: 5    5. Mixed hyperlipidemia  Reviewed lipids from 7/14/2020 which indicate good control with current treatment. Recommend patient continue with simvastatin as ordered. 6. Rheumatoid arthritis involving multiple sites, unspecified whether rheumatoid factor present ([facility] [address]  Stable. Follows with rheumatology.     7. Need for immunization against influenza  - INFLUENZA, QUADV, 3 YRS AND OLDER, IM, MDV, 0.5ML (Patria Andino)

## 2020-12-02 LAB
A/G RATIO: 1.7 (ref 1.1–2.2)
ALBUMIN SERPL-MCNC: 4.5 G/DL (ref 3.4–5)
ALP BLD-CCNC: 63 U/L (ref 40–129)
ALT SERPL-CCNC: 45 U/L (ref 10–40)
ANION GAP SERPL CALCULATED.3IONS-SCNC: 14 MMOL/L (ref 3–16)
AST SERPL-CCNC: 46 U/L (ref 15–37)
BILIRUB SERPL-MCNC: 0.5 MG/DL (ref 0–1)
BUN BLDV-MCNC: 9 MG/DL (ref 7–20)
CALCIUM SERPL-MCNC: 9.3 MG/DL (ref 8.3–10.6)
CHLORIDE BLD-SCNC: 97 MMOL/L (ref 99–110)
CO2: 22 MMOL/L (ref 21–32)
CREAT SERPL-MCNC: 0.8 MG/DL (ref 0.8–1.3)
GFR AFRICAN AMERICAN: >60
GFR NON-AFRICAN AMERICAN: >60
GLOBULIN: 2.6 G/DL
GLUCOSE BLD-MCNC: 112 MG/DL (ref 70–99)
POTASSIUM SERPL-SCNC: 4.3 MMOL/L (ref 3.5–5.1)
SODIUM BLD-SCNC: 133 MMOL/L (ref 136–145)
TOTAL PROTEIN: 7.1 G/DL (ref 6.4–8.2)

## 2020-12-07 ENCOUNTER — HOSPITAL ENCOUNTER (OUTPATIENT)
Dept: MRI IMAGING | Age: 65
Discharge: HOME | End: 2020-12-07
Payer: MEDICARE

## 2020-12-07 PROCEDURE — 72195 MRI PELVIS W/O DYE: CPT

## 2020-12-10 ENCOUNTER — TELEPHONE (OUTPATIENT)
Dept: CARDIOLOGY CLINIC | Age: 65
End: 2020-12-10

## 2020-12-10 NOTE — TELEPHONE ENCOUNTER
Let's have him wear 48 hour holter monitor prior to OV so I have information to evaluate at visit. Indication=palpitations, tachycardia. Thanks.

## 2020-12-10 NOTE — TELEPHONE ENCOUNTER
Pt came into [facility] office and EDE Garza put on a 49hr CAM monitor. Pt is to return monitor on 12/14. Then once it is downloaded I will call [facility] [facility] and have her put in a STAT request so that way [facility] has a report for the pt's 12/16 appt per [facility].

## 2020-12-10 NOTE — TELEPHONE ENCOUNTER
Pt's wife called and sts that the pt was in the hosp yesterday due to heart rate going from 190's and then dropping down to 54. Pt does have an appt on 12/16/20 w/[facility]? Pt has a history of palps, HTN. Pt's wife stated that pt almost \"passed\" out when pulse went to 54. She wants to know if pt needs to be seen sooner Please advise [facility].  Thank you

## 2020-12-14 ENCOUNTER — TELEPHONE (OUTPATIENT)
Dept: CARDIOLOGY CLINIC | Age: 65
End: 2020-12-14

## 2020-12-14 NOTE — TELEPHONE ENCOUNTER
Pt has an appt on 12/16/20, wore a CAM monitor for 49hrs. pts wife brought in monitor on 12/14/20. I called CAM rep Hua Redd and she is going to put in a STAT request for the report so that way [facility] has it for the pt's appt on 12/16.  Thank you

## 2020-12-15 NOTE — PROGRESS NOTES
[facility]   Cardiac Consultation    Referring Provider:  Byron Dennis, APRN - CNP     Chief Complaint   Patient presents with    Follow-up     No new complaints    Other     49hr CAM report    Hypertension      Subjective:  Mr Lucho Ellison is being seen today for cardiology followup of HTN, HLD, palpitations; no complaints today     History of Present Illness:   Mr Lucho Ellison 63 y.o. male here for routine cardiac f/u. Last OV 9/19. He has PMH HTN, HLD, GERD,, and enlarged prostate. Note remote [facility] 2004 showed normal coronaries. Most recent ECHO 10/26/18 EF 55-60%. moderate concentric LVH, Mild MR, TR, OH. Most recent Lexiscan myoview stress test 10/26/18 Normal myocardial perfusion normal LV function and wall  motion. EF=55%. Note event monitor 11/13/18-12/12/18 showed NSR most of test avg HR 77 bpm, no pauses or arrhythmias tachycardia 11% and bradycardia 11% no afib detected. Admited [facility] 6/19/19 with fatigue and mild CP. Noted home BP >200mmHg after he ran out of lisinopril. Noted elevated /110, 197/118mmHg. Note increased lisinopril from 10 to 20mg daily and added amlodipine 5 mg daily. Note on 7/11/19 HCTZ stopped due to orthostatic hypotension per Dr. Keaton Dawson. Most recent EKG 7/18/19 Normal; NSR (no change from 8/18 EKG). On 12/10/20 patients wife called the office stated he was evaluated at outside hospital. Stated  HR ranging from 's and near syncope. I ordered most recent 48 hour monitor 12/10-12/13/20 predominant NSR. Average HR 93bpm. <1% PAC. Today he reports recent \"cold and cough\". On 12/10/20 while having fast HR was taking cough medicine for cold symptoms. He continues to drink 6 pack of beer daily. He uses marijuana multiple times weekly. Denies chest pain, shortness of breath, edema, dizziness, palpitations and syncope. He is feeling better now.      Past Medical History: has a past medical history of Acid reflux, Arthritis, Depression, Enlarged prostate, Hearing decreased, Hyperlipidemia, Hypertension, Muscle pain, and Rheumatoid arthritis ([facility] [address] Surgical History:   has a past surgical history that includes Appendectomy; Wrist surgery; other surgical history (2012); Cardiac catheterization; Cystocopy; Colonoscopy (2013); and Upper gastrointestinal endoscopy (2013). Social History:   Retired from construction, , 2 grown children lives with wife in [place], [place]  He reports that he quit smoking about 25 years ago after smoking 15 yrs 5ppd. . He has a 30.00 pack-year smoking history. His smokeless tobacco use includes Chew. He reports that he drinks about 6 pack per alcohol per day . He reports that he uses drugs, including Marijuana several times per week. Family History:  Da MI  age 46, Brother CHF Sister CABG age 72  family history includes Cancer in his mother; Diabetes in his mother; Heart Disease in his father; High Blood Pressure in his father; Stroke in his mother. Home Medications:  Prior to Admission medications    Medication Sig Start Date End Date Taking?  Authorizing Provider   vitamin B-12 (CYANOCOBALAMIN) 500 MCG tablet Take 500 mcg by mouth daily   Yes Historical Provider, MD   polycarbophil (FIBERCON) 625 MG tablet Take 625 mg by mouth daily   Yes Historical Provider, MD   lisinopril (PRINIVIL;ZESTRIL) 30 MG tablet TAKE 1 TABLET BY MOUTH EVERY DAY 20  Yes SATISH Cervantes - CNP   tamsulosin (FLOMAX) 0.4 MG capsule Take 0.4 mg by mouth daily   Yes Historical Provider, MD   ibuprofen (ADVIL) 200 MG tablet Take 3 tablets by mouth every 6 hours as needed for Pain Or simply direct to over-the-counter bottle 20  Yes Lesly Mayen MD   ondansetron [facility]) 8 MG tablet Take 1 tablet by mouth every 8 hours as needed for Nausea 20  Yes Lesly Mayen MD FLUoxetine (PROZAC) 40 MG capsule TAKE 2 CAPSULES BY MOUTH EVERY DAY 7/14/20  Yes Abel Query, APRN - CNP   simvastatin (ZOCOR) 20 MG tablet Take 1 tablet by mouth nightly 7/14/20  Yes Abel Query, APRN - CNP   terazosin (HYTRIN) 5 MG capsule TAKE 1 CAPSULE BY MOUTH EVERY DAY 7/14/20  Yes Abel Query, APRN - CNP   amLODIPine (NORVASC) 5 MG tablet Take 1 tablet by mouth daily 7/14/20  Yes Abel Query, APRN - CNP   metoprolol succinate (TOPROL XL) 25 MG extended release tablet Take 1 tablet by mouth daily 7/14/20  Yes Abel Query, APRN - CNP   potassium chloride (KLOR-CON 10) 10 MEQ extended release tablet Take 1 tablet by mouth daily 7/14/20  Yes Abel Query, APRN - CNP   Tofacitinib Citrate (XELJANZ XR) 11 MG TB24 Take 11 mg by mouth daily   Yes Historical Provider, MD   finasteride (PROSCAR) 5 MG tablet Take 5 mg by mouth daily   Yes Historical Provider, MD   sulindac (CLINORIL) 150 MG tablet TAKE 1 TABLET BY MOUTH 2 TIMES DAILY. 11/28/16  Yes Historical Provider, MD   aspirin 81 MG chewable tablet Take 81 mg by mouth daily. Yes Historical Provider, MD   omeprazole (PRILOSEC) 20 MG capsule Take 20 mg by mouth Daily    Yes Historical Provider, MD        Allergies:  Codeine and Pcn [penicillins]     Review of Systems:   · Constitutional: there has been no unanticipated weight loss. There's been no change in energy level, sleep pattern, or activity level. · Eyes: No visual changes or diplopia. No scleral icterus. · ENT: No Headaches, hearing loss or vertigo. No mouth sores or sore throat. · Cardiovascular: Reviewed in HPI  · Respiratory: No cough or wheezing, no sputum production. No hematemesis. · Gastrointestinal: No abdominal pain, appetite loss, blood in stools. No change in bowel or bladder habits. · Genitourinary: No dysuria, trouble voiding, or hematuria. · Musculoskeletal:  No gait disturbance, weakness or joint complaints. · Integumentary: No rash or pruritis. · Neurological: No headache, diplopia, change in muscle strength, numbness or tingling. No change in gait, balance, coordination, mood, affect, memory, mentation, behavior. · Psychiatric: No anxiety, no depression. · Endocrine: No malaise, fatigue or temperature intolerance. No excessive thirst, fluid intake, or urination. No tremor. · Hematologic/Lymphatic: No abnormal bruising or bleeding, blood clots or swollen lymph nodes. · Allergic/Immunologic: No nasal congestion or hives. Physical Examination:    Vitals:    12/16/20 1426   BP: 124/70   Pulse: 88   Temp: 97 °F (36.1 °C)   SpO2: 96%          Constitutional and General Appearance: NAD   Respiratory:  · Normal excursion and expansion without use of accessory muscles  · Resp Auscultation: Normal breath sounds without dullness  Cardiovascular:  · The apical impulses not displaced  · Heart tones are crisp and normal  · Cervical veins are not engorged  · The carotid upstroke is normal in amplitude and contour without delay or bruit  · Normal S1S2, No S3, No Murmur  · Peripheral pulses are symmetrical and full  · There is no clubbing, cyanosis of the extremities. · No edema  · Femoral Arteries: 2+ and equal  · Pedal Pulses: 2+ and equal   Abdomen:  · No masses or tenderness  · Liver/Spleen: No Abnormalities Noted  Neurological/Psychiatric:  · Alert and oriented in all spheres  · Moves all extremities well  · Exhibits normal gait balance and coordination  · No abnormalities of mood, affect, memory, mentation, or behavior are noted  Skin:  · Skin: warm and dry.     Lab Results   Component Value Date    CHOL 169 07/14/2020    CHOL 172 07/23/2019    CHOL 178 06/20/2019     Lab Results   Component Value Date    TRIG 186 (H) 07/14/2020    TRIG 111 07/23/2019    TRIG 347 (H) 06/20/2019     Lab Results   Component Value Date    HDL 47 07/14/2020    HDL 54 07/23/2019    HDL 44 06/20/2019     Lab Results   Component Value Date Peter Landau.  Antwon Branham M.D., [facility]

## 2020-12-16 ENCOUNTER — OFFICE VISIT (OUTPATIENT)
Dept: CARDIOLOGY CLINIC | Age: 65
End: 2020-12-16
Payer: MEDICARE

## 2020-12-16 ENCOUNTER — HOSPITAL ENCOUNTER (OUTPATIENT)
Age: 65
Discharge: HOME | End: 2020-12-16
Payer: MEDICARE

## 2020-12-16 VITALS
HEART RATE: 88 BPM | DIASTOLIC BLOOD PRESSURE: 70 MMHG | SYSTOLIC BLOOD PRESSURE: 124 MMHG | BODY MASS INDEX: 30.49 KG/M2 | HEIGHT: 70 IN | OXYGEN SATURATION: 96 % | TEMPERATURE: 97 F | WEIGHT: 213 LBS

## 2020-12-16 PROBLEM — Z87.891 HISTORY OF TOBACCO ABUSE: Status: ACTIVE | Noted: 2020-12-16

## 2020-12-16 PROCEDURE — 3017F COLORECTAL CA SCREEN DOC REV: CPT | Performed by: INTERNAL MEDICINE

## 2020-12-16 PROCEDURE — G8417 CALC BMI ABV UP PARAM F/U: HCPCS | Performed by: INTERNAL MEDICINE

## 2020-12-16 PROCEDURE — 36415 COLL VENOUS BLD VENIPUNCTURE: CPT

## 2020-12-16 PROCEDURE — 80053 COMPREHEN METABOLIC PANEL: CPT

## 2020-12-16 PROCEDURE — 99214 OFFICE O/P EST MOD 30 MIN: CPT | Performed by: INTERNAL MEDICINE

## 2020-12-16 PROCEDURE — G8482 FLU IMMUNIZE ORDER/ADMIN: HCPCS | Performed by: INTERNAL MEDICINE

## 2020-12-16 PROCEDURE — G8427 DOCREV CUR MEDS BY ELIG CLIN: HCPCS | Performed by: INTERNAL MEDICINE

## 2020-12-16 PROCEDURE — 4004F PT TOBACCO SCREEN RCVD TLK: CPT | Performed by: INTERNAL MEDICINE

## 2020-12-16 RX ORDER — SIMVASTATIN 20 MG
20 TABLET ORAL NIGHTLY
Qty: 90 TABLET | Refills: 3 | Status: SHIPPED | OUTPATIENT
Start: 2020-12-16 | End: 2020-12-17 | Stop reason: SINTOL

## 2020-12-16 RX ORDER — METOPROLOL SUCCINATE 25 MG/1
25 TABLET, EXTENDED RELEASE ORAL DAILY
Qty: 90 TABLET | Refills: 3 | Status: SHIPPED | OUTPATIENT
Start: 2020-12-16 | End: 2021-03-10 | Stop reason: SDUPTHER

## 2020-12-16 RX ORDER — CALCIUM POLYCARBOPHIL 625 MG 625 MG/1
625 TABLET ORAL DAILY
COMMUNITY

## 2020-12-16 RX ORDER — LISINOPRIL 30 MG/1
TABLET ORAL
Qty: 90 TABLET | Refills: 3 | Status: SHIPPED | OUTPATIENT
Start: 2020-12-16 | End: 2021-12-06 | Stop reason: SDUPTHER

## 2020-12-16 RX ORDER — AMLODIPINE BESYLATE 5 MG/1
5 TABLET ORAL DAILY
Qty: 90 TABLET | Refills: 3 | Status: SHIPPED | OUTPATIENT
Start: 2020-12-16 | End: 2021-12-06 | Stop reason: SDUPTHER

## 2020-12-16 RX ORDER — POTASSIUM CHLORIDE 750 MG/1
10 TABLET, FILM COATED, EXTENDED RELEASE ORAL DAILY
Qty: 90 TABLET | Refills: 3 | Status: SHIPPED | OUTPATIENT
Start: 2020-12-16 | End: 2021-12-06 | Stop reason: SDUPTHER

## 2020-12-16 RX ORDER — CHOLECALCIFEROL (VITAMIN D3) 125 MCG
500 CAPSULE ORAL DAILY
COMMUNITY

## 2020-12-17 LAB
A/G RATIO: 1.7 (ref 1.1–2.2)
ALBUMIN SERPL-MCNC: 4.5 G/DL (ref 3.4–5)
ALP BLD-CCNC: 68 U/L (ref 40–129)
ALT SERPL-CCNC: 114 U/L (ref 10–40)
ANION GAP SERPL CALCULATED.3IONS-SCNC: 12 MMOL/L (ref 3–16)
AST SERPL-CCNC: 125 U/L (ref 15–37)
BILIRUB SERPL-MCNC: <0.2 MG/DL (ref 0–1)
BUN BLDV-MCNC: 16 MG/DL (ref 7–20)
CALCIUM SERPL-MCNC: 9.2 MG/DL (ref 8.3–10.6)
CHLORIDE BLD-SCNC: 101 MMOL/L (ref 99–110)
CO2: 23 MMOL/L (ref 21–32)
CREAT SERPL-MCNC: 0.9 MG/DL (ref 0.8–1.3)
GFR AFRICAN AMERICAN: >60
GFR NON-AFRICAN AMERICAN: >60
GLOBULIN: 2.7 G/DL
GLUCOSE BLD-MCNC: 123 MG/DL (ref 70–99)
POTASSIUM SERPL-SCNC: 4.1 MMOL/L (ref 3.5–5.1)
SODIUM BLD-SCNC: 136 MMOL/L (ref 136–145)
TOTAL PROTEIN: 7.2 G/DL (ref 6.4–8.2)

## 2020-12-18 ENCOUNTER — TELEPHONE (OUTPATIENT)
Dept: FAMILY MEDICINE CLINIC | Age: 65
End: 2020-12-18

## 2020-12-18 NOTE — TELEPHONE ENCOUNTER
Wife called and I gave her the test results for the MRI. Patient referred to Ortho but wants to wait until after he sees Dr. Leigh Call because of his elevated liver enzymes. He was advised to stop Simvastain and NO tylenol. Patient had a excellent visit with Dr. Jesenia Murillo. Wife wants to know if the DOxycyline would have caused his liver enzymes to be elevated.

## 2020-12-23 ENCOUNTER — INITIAL CONSULT (OUTPATIENT)
Dept: GASTROENTEROLOGY | Age: 65
End: 2020-12-23
Payer: MEDICARE

## 2020-12-23 ENCOUNTER — HOSPITAL ENCOUNTER (OUTPATIENT)
Age: 65
Discharge: HOME | End: 2020-12-23
Payer: MEDICARE

## 2020-12-23 VITALS
SYSTOLIC BLOOD PRESSURE: 126 MMHG | DIASTOLIC BLOOD PRESSURE: 78 MMHG | HEIGHT: 66 IN | HEART RATE: 86 BPM | WEIGHT: 215 LBS | TEMPERATURE: 98 F | BODY MASS INDEX: 34.55 KG/M2

## 2020-12-23 LAB
ALBUMIN SERPL-MCNC: 4.4 G/DL (ref 3.4–5)
ALP BLD-CCNC: 63 U/L (ref 40–129)
ALT SERPL-CCNC: 107 U/L (ref 10–40)
AST SERPL-CCNC: 84 U/L (ref 15–37)
BILIRUB SERPL-MCNC: 0.3 MG/DL (ref 0–1)
BILIRUBIN DIRECT: <0.2 MG/DL (ref 0–0.3)
BILIRUBIN, INDIRECT: ABNORMAL MG/DL (ref 0–1)
FERRITIN: 233.3 NG/ML (ref 30–400)
HEPATITIS B SURFACE ANTIGEN INTERPRETATION: NORMAL
HEPATITIS C ANTIBODY INTERPRETATION: NORMAL
TOTAL PROTEIN: 7.1 G/DL (ref 6.4–8.2)

## 2020-12-23 PROCEDURE — 82103 ALPHA-1-ANTITRYPSIN TOTAL: CPT

## 2020-12-23 PROCEDURE — 86803 HEPATITIS C AB TEST: CPT

## 2020-12-23 PROCEDURE — 3017F COLORECTAL CA SCREEN DOC REV: CPT | Performed by: INTERNAL MEDICINE

## 2020-12-23 PROCEDURE — 86038 ANTINUCLEAR ANTIBODIES: CPT

## 2020-12-23 PROCEDURE — 83516 IMMUNOASSAY NONANTIBODY: CPT

## 2020-12-23 PROCEDURE — G8482 FLU IMMUNIZE ORDER/ADMIN: HCPCS | Performed by: INTERNAL MEDICINE

## 2020-12-23 PROCEDURE — 80076 HEPATIC FUNCTION PANEL: CPT

## 2020-12-23 PROCEDURE — G8427 DOCREV CUR MEDS BY ELIG CLIN: HCPCS | Performed by: INTERNAL MEDICINE

## 2020-12-23 PROCEDURE — 99204 OFFICE O/P NEW MOD 45 MIN: CPT | Performed by: INTERNAL MEDICINE

## 2020-12-23 PROCEDURE — G8417 CALC BMI ABV UP PARAM F/U: HCPCS | Performed by: INTERNAL MEDICINE

## 2020-12-23 PROCEDURE — 4004F PT TOBACCO SCREEN RCVD TLK: CPT | Performed by: INTERNAL MEDICINE

## 2020-12-23 PROCEDURE — 36415 COLL VENOUS BLD VENIPUNCTURE: CPT

## 2020-12-23 PROCEDURE — 87340 HEPATITIS B SURFACE AG IA: CPT

## 2020-12-23 PROCEDURE — 82728 ASSAY OF FERRITIN: CPT

## 2020-12-23 RX ORDER — POLYETHYLENE GLYCOL 3350 17 G/17G
238 POWDER ORAL ONCE
Qty: 238 G | Refills: 0 | Status: SHIPPED | OUTPATIENT
Start: 2020-12-23 | End: 2020-12-23

## 2020-12-23 RX ORDER — BISACODYL 5 MG
TABLET, DELAYED RELEASE (ENTERIC COATED) ORAL
Qty: 4 TABLET | Refills: 0 | Status: ON HOLD | OUTPATIENT
Start: 2020-12-23 | End: 2021-01-18 | Stop reason: HOSPADM

## 2020-12-23 NOTE — PROGRESS NOTES
[address] ,  [address] [facility], [facility]  Phone: [phone]    CHIEF COMPLAINT     Chief Complaint   Patient presents with    New Patient     abn liver labs         HPI     Referred for elevated liver enzymes. Patient has history of heavy alcohol use but has quit since he found out his liver numbers were up. He was drinking up to 6 beers a day for several years. He smokes marijuana but does not use any other drugs. No prior history of jaundice or hep C. He has regular bowel movements, denies any blood in stool. He was on doxycycline for sinus infection and wonders if this has caused his liver numbers elevations  CBC 12/9/2020 was normal  Creatinine 0.9  , , alk phos 68, albumin 4.5, total bilirubin 0.2  EGD and Colonoscopy 7/25/2013 with Dr [facility] - IMPRESSION:  1.  Multiple diminutive sessile polyps within the cecum, transverse,  descending, sigmoid colons, each removed by cold biopsy forceps; pathology  pending. 2.  Small internal hemorrhoids. 3.  Otherwise normal colonoscopy. EGD  1. Hiatal hernia. 2.  Otherwise normal esophagogastroduodenoscopy with no Barretts esophagus  seen.         PAST MEDICAL HISTORY     Past Medical History:   Diagnosis Date    Acid reflux     Arthritis     rheumatoid    Depression     Enlarged prostate     Hearing decreased     does not wear his hearing aid    Hyperlipidemia     no meds    Hypertension     Muscle pain     Rheumatoid arthritis ([address]      FAMILY HISTORY     Family History   Problem Relation Age of Onset    Cancer Mother         lung    Stroke Mother         at age 24   Sherley Oviedo Diabetes Mother     Heart Disease Father     High Blood Pressure Father      SOCIAL HISTORY     Social History     Socioeconomic History    Marital status:      Spouse name: Not on file    Number of children: Not on file    Years of education: Not on file  Highest education level: Not on file   Occupational History    Not on file   Social Needs    Financial resource strain: Not on file    Food insecurity     Worry: Not on file     Inability: Not on file    Transportation needs     Medical: Not on file     Non-medical: Not on file   Tobacco Use    Smoking status: Former Smoker     Packs/day: 2.00     Years: 15.00     Pack years: 30.00     Quit date: 1993     Years since quittin.4    Smokeless tobacco: Current User     Types: Snuff    Tobacco comment: chews daily   Substance and Sexual Activity    Alcohol use:  Yes     Alcohol/week: 30.0 - 35.0 standard drinks     Types: 30 - 35 Cans of beer per week     Comment: PER WIFE    Drug use: Yes     Types: Marijuana     Comment: couple times per wk    Sexual activity: Yes     Partners: Female   Lifestyle    Physical activity     Days per week: Not on file     Minutes per session: Not on file    Stress: Not on file   Relationships    Social connections     Talks on phone: Not on file     Gets together: Not on file     Attends Spiritism service: Not on file     Active member of club or organization: Not on file     Attends meetings of clubs or organizations: Not on file     Relationship status: Not on file    Intimate partner violence     Fear of current or ex partner: Not on file     Emotionally abused: Not on file     Physically abused: Not on file     Forced sexual activity: Not on file   Other Topics Concern    Not on file   Social History Narrative    Not on file     SURGICAL HISTORY     Past Surgical History:   Procedure Laterality Date    APPENDECTOMY      CARDIAC CATHETERIZATION      COLONOSCOPY  2013    polyps    CYSTOSCOPY      OTHER SURGICAL HISTORY  2012    muscle biopsy left shoulder    UPPER GASTROINTESTINAL ENDOSCOPY  2013    dysphagia    WRIST SURGERY      left     CURRENT MEDICATIONS (This list may include medications prescribed during this encounter as epic can not insert only the list prior to this encounter.)  Current Outpatient Rx   Medication Sig Dispense Refill    vitamin B-12 (CYANOCOBALAMIN) 500 MCG tablet Take 500 mcg by mouth daily      polycarbophil (FIBERCON) 625 MG tablet Take 625 mg by mouth daily      metoprolol succinate (TOPROL XL) 25 MG extended release tablet Take 1 tablet by mouth daily 90 tablet 3    amLODIPine (NORVASC) 5 MG tablet Take 1 tablet by mouth daily 90 tablet 3    lisinopril (PRINIVIL;ZESTRIL) 30 MG tablet TAKE 1 TABLET BY MOUTH EVERY DAY 90 tablet 3    potassium chloride (KLOR-CON 10) 10 MEQ extended release tablet Take 1 tablet by mouth daily 90 tablet 3    tamsulosin (FLOMAX) 0.4 MG capsule Take 0.4 mg by mouth daily      ibuprofen (ADVIL) 200 MG tablet Take 3 tablets by mouth every 6 hours as needed for Pain Or simply direct to over-the-counter bottle 50 tablet 0    ondansetron (ZOFRAN) 8 MG tablet Take 1 tablet by mouth every 8 hours as needed for Nausea 10 tablet 0    FLUoxetine (PROZAC) 40 MG capsule TAKE 2 CAPSULES BY MOUTH EVERY  capsule 1    terazosin (HYTRIN) 5 MG capsule TAKE 1 CAPSULE BY MOUTH EVERY DAY 90 capsule 1    Tofacitinib Citrate (XELJANZ XR) 11 MG TB24 Take 11 mg by mouth daily      finasteride (PROSCAR) 5 MG tablet Take 5 mg by mouth daily      sulindac (CLINORIL) 150 MG tablet TAKE 1 TABLET BY MOUTH 2 TIMES DAILY. 11    aspirin 81 MG chewable tablet Take 81 mg by mouth daily.       omeprazole (PRILOSEC) 20 MG capsule Take 20 mg by mouth Daily           ALLERGIES     Allergies   Allergen Reactions    Codeine      Not sure of reaction    Pcn [Penicillins]      Not sure of reaction       IMMUNIZATIONS     Immunization History   Administered Date(s) Administered    Influenza Virus Vaccine 11/24/2015, 11/28/2016  Influenza, Quadv, IM, (6 mo and older Fluzone, Flulaval, Fluarix and 3 yrs and older Afluria) 08/30/2017, 12/01/2020    PPD Test 09/09/2014    Tdap (Boostrix, Adacel) 04/18/2018    Tetanus 11/24/2015       REVIEW OF SYSTEMS     Constitutional: denies fever and unexpected weight change. HENT: Negative for ear pain, hearing loss and nosebleeds. Eyes: Negative for pain and visual disturbance. Respiratory: Negative for cough, shortness of breath and wheezing. Cardiovascular: Negative for chest pain, palpitations and leg swelling. Gastrointestinal: see HPI for details. Endocrine: Negative for polydipsia, polyphagia and polyuria. Genitourinary: Negative for difficulty urinating, dysuria, hematuria and urgency. Musculoskeletal: Positive for arthralgias and back pain. Skin: Negative for pallor and rash. Allergic/Immunologic: Negative for environmental allergies and immunocompromised state. Neurological: Negative for seizures, syncope. Hematological: Negative for adenopathy. Does not bruise/bleed easily. Psychiatric/Behavioral: Negative for agitation, confusion, hallucinations.     PHYSICAL EXAM   VITAL SIGNS: /78   Pulse 86   Temp 98 °F (36.7 °C)   Ht 5' 6\" (1.676 m)   Wt 215 lb (97.5 kg)   BMI 34.70 kg/m²   Wt Readings from Last 3 Encounters:   12/23/20 215 lb (97.5 kg)   12/16/20 213 lb (96.6 kg)   12/01/20 204 lb 6.4 oz (92.7 kg)     Gen: AAO3, NAD  HEENT: no pallor or icterus  Neck: supple, no adenopathy  RS: clear to auscultation bilaterally  CVS: S1S2 RRR, no murmurs  GI: soft, nontender, not distended, BS+, no hepatosplenomegaly  Ext: no edema or clubbing       FINAL IMPRESSION 1. Elevated liver enzymes - AST>ALT could suggest alcoholic liver disease. Was drinking 6 beers a day for several years but has quit since he found out his liver numbers have been elevated, discussed the need to completely quit all alcohol use, further alcohol use may cause liver damage and liver failure. Check tests for other causes of chronic liver disease  Liver ultrasound ordered to assess liver morphology and rule out focal liver lesions  FibroScan to stage degree of fibrosis    2. Multiple adenomatous polyps at colonoscopy 2013, he is overdue for a colonoscopy, we will set this up with MAC. Procedure discussed with patient in detail including risks, benefits and alternatives. Anesthesia risks, risk of perforation, bleeding discussed with the patient.

## 2020-12-24 LAB — ANTI-NUCLEAR ANTIBODY (ANA): NEGATIVE

## 2020-12-25 LAB
F-ACTIN AB IGG: 16 UNITS (ref 0–19)
MITOCHONDRIAL M2 AB, IGG: 8.6 UNITS (ref 0–24.9)

## 2020-12-29 LAB — ALPHA-1 ANTITRYPSIN: 111 MG/DL (ref 90–200)

## 2021-01-12 ENCOUNTER — HOSPITAL ENCOUNTER (OUTPATIENT)
Age: 66
Discharge: HOME | End: 2021-01-12
Payer: MEDICARE

## 2021-01-12 LAB — SARS-COV-2, PCR: NOT DETECTED

## 2021-01-12 PROCEDURE — U0003 INFECTIOUS AGENT DETECTION BY NUCLEIC ACID (DNA OR RNA); SEVERE ACUTE RESPIRATORY SYNDROME CORONAVIRUS 2 (SARS-COV-2) (CORONAVIRUS DISEASE [COVID-19]), AMPLIFIED PROBE TECHNIQUE, MAKING USE OF HIGH THROUGHPUT TECHNOLOGIES AS DESCRIBED BY CMS-2020-01-R: HCPCS

## 2021-01-13 ENCOUNTER — HOSPITAL ENCOUNTER (OUTPATIENT)
Dept: ULTRASOUND IMAGING | Age: 66
Discharge: HOME | End: 2021-01-13
Payer: MEDICARE

## 2021-01-13 DIAGNOSIS — R74.8 ELEVATED LIVER ENZYMES: ICD-10-CM

## 2021-01-13 PROCEDURE — 76705 ECHO EXAM OF ABDOMEN: CPT

## 2021-01-15 NOTE — PROGRESS NOTES
PAT Call placed. Message left with Nimisha Dowd (wife, on contact list) in regards to Endoscopy procedure on 1/18/2021 to arrive at 0800. Instructions reviewed - Must have a  for procedure and  NPO after am dose prep completed , pt to call physician office if any difficulty with prep for procedure, phone number provided.

## 2021-01-18 ENCOUNTER — ANESTHESIA (OUTPATIENT)
Dept: ENDOSCOPY | Age: 66
End: 2021-01-18
Payer: MEDICARE

## 2021-01-18 ENCOUNTER — ANESTHESIA EVENT (OUTPATIENT)
Dept: ENDOSCOPY | Age: 66
End: 2021-01-18
Payer: MEDICARE

## 2021-01-18 ENCOUNTER — HOSPITAL ENCOUNTER (OUTPATIENT)
Dept: ENDOSCOPY | Age: 66
Discharge: HOME | End: 2021-01-18
Payer: MEDICARE

## 2021-01-18 ENCOUNTER — HOSPITAL ENCOUNTER (OUTPATIENT)
Age: 66
Setting detail: OUTPATIENT SURGERY
Discharge: HOME | End: 2021-01-18
Attending: INTERNAL MEDICINE | Admitting: INTERNAL MEDICINE
Payer: MEDICARE

## 2021-01-18 VITALS
WEIGHT: 210 LBS | DIASTOLIC BLOOD PRESSURE: 81 MMHG | RESPIRATION RATE: 20 BRPM | BODY MASS INDEX: 30.06 KG/M2 | TEMPERATURE: 97 F | SYSTOLIC BLOOD PRESSURE: 135 MMHG | HEIGHT: 70 IN | HEART RATE: 64 BPM | OXYGEN SATURATION: 96 %

## 2021-01-18 VITALS
RESPIRATION RATE: 19 BRPM | DIASTOLIC BLOOD PRESSURE: 70 MMHG | SYSTOLIC BLOOD PRESSURE: 115 MMHG | OXYGEN SATURATION: 97 %

## 2021-01-18 PROBLEM — Z86.010 HX OF COLONIC POLYPS: Status: ACTIVE | Noted: 2021-01-18

## 2021-01-18 PROBLEM — Z86.0100 HX OF COLONIC POLYPS: Status: ACTIVE | Noted: 2021-01-18

## 2021-01-18 PROCEDURE — 2580000003 HC RX 258: Performed by: INTERNAL MEDICINE

## 2021-01-18 PROCEDURE — 7100000010 HC PHASE II RECOVERY - FIRST 15 MIN: Performed by: INTERNAL MEDICINE

## 2021-01-18 PROCEDURE — 2709999900 HC NON-CHARGEABLE SUPPLY: Performed by: INTERNAL MEDICINE

## 2021-01-18 PROCEDURE — 3609027000 HC COLONOSCOPY: Performed by: INTERNAL MEDICINE

## 2021-01-18 PROCEDURE — 3700000000 HC ANESTHESIA ATTENDED CARE: Performed by: INTERNAL MEDICINE

## 2021-01-18 PROCEDURE — 91200 LIVER ELASTOGRAPHY: CPT

## 2021-01-18 PROCEDURE — 6360000002 HC RX W HCPCS: Performed by: NURSE ANESTHETIST, CERTIFIED REGISTERED

## 2021-01-18 PROCEDURE — 91200 LIVER ELASTOGRAPHY: CPT | Performed by: INTERNAL MEDICINE

## 2021-01-18 PROCEDURE — 7100000011 HC PHASE II RECOVERY - ADDTL 15 MIN: Performed by: INTERNAL MEDICINE

## 2021-01-18 PROCEDURE — 3700000001 HC ADD 15 MINUTES (ANESTHESIA): Performed by: INTERNAL MEDICINE

## 2021-01-18 PROCEDURE — G0105 COLORECTAL SCRN; HI RISK IND: HCPCS | Performed by: INTERNAL MEDICINE

## 2021-01-18 RX ORDER — SODIUM CHLORIDE, SODIUM LACTATE, POTASSIUM CHLORIDE, CALCIUM CHLORIDE 600; 310; 30; 20 MG/100ML; MG/100ML; MG/100ML; MG/100ML
INJECTION, SOLUTION INTRAVENOUS CONTINUOUS
Status: DISCONTINUED | OUTPATIENT
Start: 2021-01-18 | End: 2021-01-18 | Stop reason: HOSPADM

## 2021-01-18 RX ORDER — PROPOFOL 10 MG/ML
INJECTION, EMULSION INTRAVENOUS PRN
Status: DISCONTINUED | OUTPATIENT
Start: 2021-01-18 | End: 2021-01-18 | Stop reason: SDUPTHER

## 2021-01-18 RX ADMIN — PROPOFOL 70 MG: 10 INJECTION, EMULSION INTRAVENOUS at 08:46

## 2021-01-18 RX ADMIN — PROPOFOL 70 MG: 10 INJECTION, EMULSION INTRAVENOUS at 08:41

## 2021-01-18 RX ADMIN — PROPOFOL 60 MG: 10 INJECTION, EMULSION INTRAVENOUS at 08:51

## 2021-01-18 RX ADMIN — SODIUM CHLORIDE, POTASSIUM CHLORIDE, SODIUM LACTATE AND CALCIUM CHLORIDE: 600; 310; 30; 20 INJECTION, SOLUTION INTRAVENOUS at 08:40

## 2021-01-18 ASSESSMENT — PAIN - FUNCTIONAL ASSESSMENT: PAIN_FUNCTIONAL_ASSESSMENT: 0-10

## 2021-01-18 ASSESSMENT — PAIN SCALES - GENERAL
PAINLEVEL_OUTOF10: 0
PAINLEVEL_OUTOF10: 0

## 2021-01-18 NOTE — H&P
Chief Complaint   Patient presents with    New Patient       abn liver labs          HPI      Referred for elevated liver enzymes. Patient has history of heavy alcohol use but has quit since he found out his liver numbers were up. He was drinking up to 6 beers a day for several years. He smokes marijuana but does not use any other drugs. No prior history of jaundice or hep C. He has regular bowel movements, denies any blood in stool. He was on doxycycline for sinus infection and wonders if this has caused his liver numbers elevations  CBC 12/9/2020 was normal  Creatinine 0.9  , , alk phos 68, albumin 4.5, total bilirubin 0.2  EGD and Colonoscopy 7/25/2013 with Dr [place] Clair - IMPRESSION:  1.  Multiple diminutive sessile polyps within the cecum, transverse,  descending, sigmoid colons, each removed by cold biopsy forceps; pathology  pending. 2.  Small internal hemorrhoids. 3.  Otherwise normal colonoscopy.       EGD  1.  Hiatal hernia.   2.  Otherwise normal esophagogastroduodenoscopy with no Barretts esophagus  seen.           PAST MEDICAL HISTORY      Past Medical History        Past Medical History:   Diagnosis Date    Acid reflux      Arthritis       rheumatoid    Depression      Enlarged prostate      Hearing decreased       does not wear his hearing aid    Hyperlipidemia       no meds    Hypertension      Muscle pain      Rheumatoid arthritis (HCC)           FAMILY HISTORY      Family History         Family History   Problem Relation Age of Onset    Cancer Mother           lung    Stroke Mother           at age 24   Loera Diabetes Mother      Heart Disease Father      High Blood Pressure Father           SOCIAL HISTORY      Social History               Socioeconomic History    Marital status:        Spouse name: Not on file    Number of children: Not on file    Years of education: Not on file    Highest education level: Not on file   Occupational History  Not on file   Social Needs    Financial resource strain: Not on file    Food insecurity       Worry: Not on file       Inability: Not on file    Transportation needs       Medical: Not on file       Non-medical: Not on file   Tobacco Use    Smoking status: Former Smoker       Packs/day: 2.00       Years: 15.00       Pack years: 30.00       Quit date: 1993       Years since quittin.4    Smokeless tobacco: Current User       Types: Snuff    Tobacco comment: chews daily   Substance and Sexual Activity    Alcohol use: Yes       Alcohol/week: 30.0 - 35.0 standard drinks       Types: 30 - 35 Cans of beer per week       Comment: PER WIFE    Drug use:  Yes       Types: Marijuana       Comment: couple times per wk    Sexual activity: Yes       Partners: Female   Lifestyle    Physical activity       Days per week: Not on file       Minutes per session: Not on file    Stress: Not on file   Relationships    Social connections       Talks on phone: Not on file       Gets together: Not on file       Attends Orthodoxy service: Not on file       Active member of club or organization: Not on file       Attends meetings of clubs or organizations: Not on file       Relationship status: Not on file    Intimate partner violence       Fear of current or ex partner: Not on file       Emotionally abused: Not on file       Physically abused: Not on file       Forced sexual activity: Not on file   Other Topics Concern    Not on file   Social History Narrative    Not on file         SURGICAL HISTORY      Past Surgical History         Past Surgical History:   Procedure Laterality Date    APPENDECTOMY        CARDIAC CATHETERIZATION        COLONOSCOPY   2013     polyps    CYSTOSCOPY        OTHER SURGICAL HISTORY   2012     muscle biopsy left shoulder    UPPER GASTROINTESTINAL ENDOSCOPY   2013     dysphagia    WRIST SURGERY         left         CURRENT MEDICATIONS (This list may include medications prescribed during this encounter as epic can not insert only the list prior to this encounter.)  Current Outpatient Rx          Current Outpatient Rx   Medication Sig Dispense Refill    vitamin B-12 (CYANOCOBALAMIN) 500 MCG tablet Take 500 mcg by mouth daily        polycarbophil (FIBERCON) 625 MG tablet Take 625 mg by mouth daily        metoprolol succinate (TOPROL XL) 25 MG extended release tablet Take 1 tablet by mouth daily 90 tablet 3    amLODIPine (NORVASC) 5 MG tablet Take 1 tablet by mouth daily 90 tablet 3    lisinopril (PRINIVIL;ZESTRIL) 30 MG tablet TAKE 1 TABLET BY MOUTH EVERY DAY 90 tablet 3    potassium chloride (KLOR-CON 10) 10 MEQ extended release tablet Take 1 tablet by mouth daily 90 tablet 3    tamsulosin (FLOMAX) 0.4 MG capsule Take 0.4 mg by mouth daily        ibuprofen (ADVIL) 200 MG tablet Take 3 tablets by mouth every 6 hours as needed for Pain Or simply direct to over-the-counter bottle 50 tablet 0    ondansetron (ZOFRAN) 8 MG tablet Take 1 tablet by mouth every 8 hours as needed for Nausea 10 tablet 0    FLUoxetine (PROZAC) 40 MG capsule TAKE 2 CAPSULES BY MOUTH EVERY  capsule 1    terazosin (HYTRIN) 5 MG capsule TAKE 1 CAPSULE BY MOUTH EVERY DAY 90 capsule 1    Tofacitinib Citrate (XELJANZ XR) 11 MG TB24 Take 11 mg by mouth daily        finasteride (PROSCAR) 5 MG tablet Take 5 mg by mouth daily        sulindac (CLINORIL) 150 MG tablet TAKE 1 TABLET BY MOUTH 2 TIMES DAILY.    11    aspirin 81 MG chewable tablet Take 81 mg by mouth daily.        omeprazole (PRILOSEC) 20 MG capsule Take 20 mg by mouth Daily                 ALLERGIES            Allergies   Allergen Reactions    Codeine         Not sure of reaction    Pcn [Penicillins]         Not sure of reaction         IMMUNIZATIONS           Immunization History   Administered Date(s) Administered    Influenza Virus Vaccine 11/24/2015, 11/28/2016  Influenza, Quadv, IM, (6 mo and older Fluzone, Flulaval, Fluarix and 3 yrs and older Afluria) 08/30/2017, 12/01/2020    PPD Test 09/09/2014    Tdap (Boostrix, Adacel) 04/18/2018    Tetanus 11/24/2015         REVIEW OF SYSTEMS      Constitutional: denies fever and unexpected weight change. HENT: Negative for ear pain, hearing loss and nosebleeds. Eyes: Negative for pain and visual disturbance. Respiratory: Negative for cough, shortness of breath and wheezing. Cardiovascular: Negative for chest pain, palpitations and leg swelling. Gastrointestinal: see HPI for details. Endocrine: Negative for polydipsia, polyphagia and polyuria. Genitourinary: Negative for difficulty urinating, dysuria, hematuria and urgency. Musculoskeletal: Positive for arthralgias and back pain. Skin: Negative for pallor and rash. Allergic/Immunologic: Negative for environmental allergies and immunocompromised state. Neurological: Negative for seizures, syncope. Hematological: Negative for adenopathy. Does not bruise/bleed easily.    Psychiatric/Behavioral: Negative for agitation, confusion, hallucinations.     PHYSICAL EXAM   VITAL SIGNS: /78   Pulse 86   Temp 98 °F (36.7 °C)   Ht 5' 6\" (1.676 m)   Wt 215 lb (97.5 kg)   BMI 34.70 kg/m²       Wt Readings from Last 3 Encounters:   12/23/20 215 lb (97.5 kg)   12/16/20 213 lb (96.6 kg)   12/01/20 204 lb 6.4 oz (92.7 kg)      Gen: AAO3, NAD  HEENT: no pallor or icterus  Neck: supple, no adenopathy  RS: clear to auscultation bilaterally  CVS: S1S2 RRR, no murmurs  GI: soft, nontender, not distended, BS+, no hepatosplenomegaly  Ext: no edema or clubbing        FINAL IMPRESSION     1. Elevated liver enzymes - AST>ALT could suggest alcoholic liver disease. Was drinking 6 beers a day for several years but has quit since he found out his liver numbers have been elevated, discussed the need to completely quit all alcohol use, further alcohol use may cause liver damage and liver failure. Check tests for other causes of chronic liver disease  Liver ultrasound ordered to assess liver morphology and rule out focal liver lesions  FibroScan to stage degree of fibrosis     2.  Multiple adenomatous polyps at colonoscopy 2013, he is overdue for a colonoscopy, we will set this up with MAC. Procedure discussed with patient in detail including risks, benefits and alternatives. Anesthesia risks, risk of perforation, bleeding discussed with the patient.

## 2021-01-18 NOTE — PROGRESS NOTES
Pt to [facility] for Fibroscan. Pt states NPO for 4 hours and denies any implantable devices. Pt tolerated procedure well. Pt discharged to home in stable condition. Verbal discharge instructions given for pt to follow up with MD.  Pt ambulatory to ENDO for colonscopy.

## 2021-01-18 NOTE — OP NOTE
Findings: -Small internal hemorrhoids. Otherwise normal colonoscopy to the cecum.    - Anesthesia issues: no    Specimens: Was Not Obtained    Complications:   None    Estimated blood loss: minimal    Disposition:   PACU - hemodynamically stable. Impression:   -Small internal hemorrhoids. Otherwise normal colonoscopy to the cecum. Recommendations:  -Repeat colonoscopy 5 years due to prior history of colon polyps. - Fibroscan done today shows fatty liver and F2 (moderate fibrosis), discussed need to quit all alcohol use, patient states he has done so, followup in office 3-4 months.         Peyton Dyer 1/18/21 8:31 AM EST

## 2021-01-18 NOTE — PROGRESS NOTES
Instructions given to spouse gisel, verbalized understanding. Patient alert and oriented, dressing self, gait steady.

## 2021-01-18 NOTE — ANESTHESIA POSTPROCEDURE EVALUATION
Department of Anesthesiology  Postprocedure Note    Patient: Carlos Lira  MRN: [id number]  YOB: 1955  Date of evaluation: 1/18/2021  Time:  9:40 AM     Procedure Summary     Date: 01/18/21 Room / Location: [place] [address] / [facility]    Anesthesia Start: 0840 Anesthesia Stop: 0604    Procedure: COLON W/ANES. (9:00) **FIBROSCAN TOO** (N/A ) Diagnosis:       Hx of colonic polyps      (HX POLYPS)    Surgeons: Alivia Oviedo MD Responsible Provider: Jamaica Brewer MD    Anesthesia Type: MAC ASA Status: 3          Anesthesia Type: MAC    Peter Phase I: Peter Score: 10    Peter Phase II: Peter Score: 10    Last vitals: Reviewed and per EMR flowsheets.        Anesthesia Post Evaluation    Patient location during evaluation: PACU  Patient participation: complete - patient participated  Level of consciousness: awake and alert  Pain score: 0  Airway patency: patent  Nausea & Vomiting: no nausea and no vomiting  Complications: no  Cardiovascular status: blood pressure returned to baseline  Respiratory status: acceptable  Hydration status: stable

## 2021-01-18 NOTE — ANESTHESIA PRE PROCEDURE
Department of Anesthesiology  Preprocedure Note       Name:  Pritesh Barragan   Age:  63 y.o.  :  1955                                          MRN:  [id number]         Date:  2021      Surgeon: Lisa Moore):  Becca Acosta MD    Procedure: Procedure(s):  COLON W/ANES. (9:00) **FIBROSCAN TOO**    Medications prior to admission:   Prior to Admission medications    Medication Sig Start Date End Date Taking? Authorizing Provider   bisacodyl (BISACODYL) 5 MG EC tablet Take 4 tablets of Bisacodyl for colonoscopy prep as directed.  20  Yes Becca Acosta MD   vitamin B-12 (CYANOCOBALAMIN) 500 MCG tablet Take 500 mcg by mouth daily   Yes Historical Provider, MD   polycarbophil (FIBERCON) 625 MG tablet Take 625 mg by mouth daily   Yes Historical Provider, MD   metoprolol succinate (TOPROL XL) 25 MG extended release tablet Take 1 tablet by mouth daily 20  Yes Elvia Nguyen MD   amLODIPine (NORVASC) 5 MG tablet Take 1 tablet by mouth daily 20  Yes Elvia Nguyen MD   lisinopril (PRINIVIL;ZESTRIL) 30 MG tablet TAKE 1 TABLET BY MOUTH EVERY DAY 20  Yes Elvia Nguyen MD   potassium chloride (KLOR-CON 10) 10 MEQ extended release tablet Take 1 tablet by mouth daily 20  Yes Elvia Nguyen MD   tamsulosin (FLOMAX) 0.4 MG capsule Take 0.4 mg by mouth daily   Yes Historical Provider, MD   ibuprofen (ADVIL) 200 MG tablet Take 3 tablets by mouth every 6 hours as needed for Pain Or simply direct to over-the-counter bottle 20  Yes Julio Allison MD   ondansetron (ZOFRAN) 8 MG tablet Take 1 tablet by mouth every 8 hours as needed for Nausea 20  Yes Julio Allison MD   FLUoxetine (PROZAC) 40 MG capsule TAKE 2 CAPSULES BY MOUTH EVERY DAY 20  Yes SATISH Tanner CNP   terazosin (HYTRIN) 5 MG capsule TAKE 1 CAPSULE BY MOUTH EVERY DAY 20  Yes SATISH Tanner CNP Tofacitinib Citrate (XELJANZ XR) 11 MG TB24 Take 11 mg by mouth daily   Yes Historical Provider, MD   finasteride (PROSCAR) 5 MG tablet Take 5 mg by mouth daily   Yes Historical Provider, MD   sulindac (CLINORIL) 150 MG tablet TAKE 1 TABLET BY MOUTH 2 TIMES DAILY. 11/28/16  Yes Historical Provider, MD   aspirin 81 MG chewable tablet Take 81 mg by mouth daily. Yes Historical Provider, MD   omeprazole (PRILOSEC) 20 MG capsule Take 20 mg by mouth Daily    Yes Historical Provider, MD       Current medications:    Current Facility-Administered Medications   Medication Dose Route Frequency Provider Last Rate Last Admin    lactated ringers infusion   Intravenous Continuous Esvin Varela MD           Allergies:     Allergies   Allergen Reactions    Codeine      Not sure of reaction    Pcn [Penicillins]      Not sure of reaction       Problem List:    Patient Active Problem List   Diagnosis Code    Muscular weakness M62.81    Depression F32.9    Acid reflux K21.9    Hyperlipidemia E78.5    Hypertension I10    Muscle pain M79.10    Hearing decreased H91.90    Arthritis M19.90    Enlarged prostate N40.0    Chronic fatigue R53.82    Dizziness R42    Palpitations R00.2    Rheumatoid arthritis ([address] M06.9    History of tobacco abuse Z87.891    Hx of colonic polyps Z86.010       Past Medical History:        Diagnosis Date    Acid reflux     Arthritis     rheumatoid    Depression     Enlarged prostate     Hearing decreased     does not wear his hearing aid    Hyperlipidemia     no meds    Hypertension     Muscle pain     Rheumatoid arthritis ([address]        Past Surgical History:        Procedure Laterality Date    APPENDECTOMY      CARDIAC CATHETERIZATION      COLONOSCOPY  7/25/2013    polyps    CYSTOSCOPY      OTHER SURGICAL HISTORY  09/27/2012    muscle biopsy left shoulder    UPPER GASTROINTESTINAL ENDOSCOPY  7/25/2013    dysphagia    WRIST SURGERY      left       Social History: BILITOT 0.3 12/23/2020    ALKPHOS 63 12/23/2020    AST 84 12/23/2020     12/23/2020       POC Tests: No results for input(s): POCGLU, POCNA, POCK, POCCL, POCBUN, POCHEMO, POCHCT in the last 72 hours. Coags:   Lab Results   Component Value Date    INR 0.93 12/09/2020    APTT 22.6 12/09/2020       HCG (If Applicable): No results found for: PREGTESTUR, PREGSERUM, HCG, HCGQUANT     ABGs: No results found for: PHART, PO2ART, FCR0DKQ, XTT1DTC, BEART, X6WPCCHH     Type & Screen (If Applicable):  No results found for: LABABO, LABRH    Drug/Infectious Status (If Applicable):  No results found for: HIV, HEPCAB    COVID-19 Screening (If Applicable):   Lab Results   Component Value Date    COVID19 Not Detected 01/12/2021         Anesthesia Evaluation  Patient summary reviewed and Nursing notes reviewed  Airway: Mallampati: II  TM distance: >3 FB   Neck ROM: full  Mouth opening: > = 3 FB Dental: normal exam         Pulmonary:Negative Pulmonary ROS and normal exam  breath sounds clear to auscultation                             Cardiovascular:    (+) hypertension: mild, hyperlipidemia        Rhythm: regular  Rate: normal                    Neuro/Psych:   (+) depression/anxiety             GI/Hepatic/Renal:   (+) GERD:, liver disease:,           Endo/Other:    (+) : arthritis: rheumatoid. , .                 Abdominal:   (+) obese,         Vascular: negative vascular ROS. Anesthesia Plan      MAC     ASA 3       Induction: intravenous. Anesthetic plan and risks discussed with patient. Plan discussed with CRNA.                   Julio Jasso MD   1/18/2021

## 2021-01-18 NOTE — PROCEDURES
Date: 1/18/2021    FibroScan steatosis result (CAP score): 373 decibels per meter (dB/m)  Steatosis grade: S3    FibroScan fibrosis result: 7.0 kilopascals (kPa)  Fibrosis score: F2    Interpretation: Fibrosis score of F2 (moderate fibrosis)  Steatosis  Likely alcohol related, discussed need to stop all alcohol use, patient understands and says he has done so.

## 2021-01-29 DIAGNOSIS — F33.42 RECURRENT MAJOR DEPRESSIVE DISORDER, IN FULL REMISSION (HCC): ICD-10-CM

## 2021-01-29 RX ORDER — FLUOXETINE HYDROCHLORIDE 40 MG/1
CAPSULE ORAL
Qty: 180 CAPSULE | Refills: 1 | Status: SHIPPED | OUTPATIENT
Start: 2021-01-29 | End: 2021-02-03 | Stop reason: SDUPTHER

## 2021-02-03 DIAGNOSIS — F33.42 RECURRENT MAJOR DEPRESSIVE DISORDER, IN FULL REMISSION (HCC): ICD-10-CM

## 2021-02-03 RX ORDER — FLUOXETINE HYDROCHLORIDE 40 MG/1
CAPSULE ORAL
Qty: 180 CAPSULE | Refills: 1 | Status: SHIPPED | OUTPATIENT
Start: 2021-02-03 | End: 2021-07-30

## 2021-02-12 RX ORDER — TERAZOSIN 5 MG/1
CAPSULE ORAL
Qty: 90 CAPSULE | Refills: 1 | Status: SHIPPED | OUTPATIENT
Start: 2021-02-12 | End: 2021-08-09

## 2021-02-26 ENCOUNTER — VIRTUAL VISIT (OUTPATIENT)
Dept: FAMILY MEDICINE CLINIC | Age: 66
End: 2021-02-26
Payer: MEDICARE

## 2021-02-26 DIAGNOSIS — B02.9 HERPES ZOSTER WITHOUT COMPLICATION: Primary | ICD-10-CM

## 2021-02-26 PROCEDURE — 99213 OFFICE O/P EST LOW 20 MIN: CPT | Performed by: NURSE PRACTITIONER

## 2021-02-26 RX ORDER — VALACYCLOVIR HYDROCHLORIDE 1 G/1
1000 TABLET, FILM COATED ORAL 3 TIMES DAILY
Qty: 21 TABLET | Refills: 0 | Status: SHIPPED | OUTPATIENT
Start: 2021-02-26 | End: 2021-03-05

## 2021-02-26 ASSESSMENT — ENCOUNTER SYMPTOMS
COUGH: 0
ABDOMINAL PAIN: 0
NAUSEA: 0
SHORTNESS OF BREATH: 0
DIARRHEA: 0
SORE THROAT: 0
WHEEZING: 0
VOMITING: 0
RHINORRHEA: 0

## 2021-02-26 NOTE — PROGRESS NOTES
2021    TELEHEALTH EVALUATION -- Audio/Visual (During ZMCJN-47 public health emergency)    HPI:    Rose Blackburn (:  1955) has requested an audio/video evaluation for the following concern(s): Rash to forehead onset Tuesday. Patient reports that area is painful. Patient reports he noticed it above his left eye around his eyebrow and hairline. Patient denies any dizziness, lightheadedness, or fever. Patient reports that he has had chills today. No nausea, vomiting, diarrhea, loss of appetite or unusual fatigue. No chest pain, tightness, palpitations or shortness of breath. No changes in bowel or bladder habits. Patient denies any other areas of rash. Review of Systems   Constitutional: Positive for chills. Negative for activity change, appetite change and fever. HENT: Negative for congestion, rhinorrhea and sore throat. Eyes: Negative for visual disturbance. Respiratory: Negative for cough, shortness of breath and wheezing. Cardiovascular: Negative for chest pain. Gastrointestinal: Negative for abdominal pain, diarrhea, nausea and vomiting. Genitourinary: Negative for dysuria, frequency, hematuria and urgency. Musculoskeletal: Negative for gait problem and myalgias. Skin: Positive for rash. Neurological: Negative for dizziness, weakness, light-headedness, numbness and headaches. Psychiatric/Behavioral: Negative for sleep disturbance. Prior to Visit Medications    Medication Sig Taking?  Authorizing Provider   valACYclovir (VALTREX) 1 g tablet Take 1 tablet by mouth 3 times daily for 7 days Yes SATISH Stout CNP   terazosin (HYTRIN) 5 MG capsule TAKE 1 CAPSULE BY MOUTH EVERY DAY Yes SATISH Wilson CNP   FLUoxetine (PROZAC) 40 MG capsule Take 2 capsules by mouth every day Yes SATISH Coronado CNP   vitamin B-12 (CYANOCOBALAMIN) 500 MCG tablet Take 500 mcg by mouth daily Yes Historical Provider, MD polycarbophil (FIBERCON) 625 MG tablet Take 625 mg by mouth daily Yes Historical Provider, MD   metoprolol succinate (TOPROL XL) 25 MG extended release tablet Take 1 tablet by mouth daily Yes Graham Valdez MD   amLODIPine (NORVASC) 5 MG tablet Take 1 tablet by mouth daily Yes Graham Valdez MD   lisinopril (PRINIVIL;ZESTRIL) 30 MG tablet TAKE 1 TABLET BY MOUTH EVERY DAY Yes Graham Valdez MD   potassium chloride (KLOR-CON 10) 10 MEQ extended release tablet Take 1 tablet by mouth daily Yes Graham Valdez MD   tamsulosin (FLOMAX) 0.4 MG capsule Take 0.4 mg by mouth daily Yes Historical Provider, MD   ibuprofen (ADVIL) 200 MG tablet Take 3 tablets by mouth every 6 hours as needed for Pain Or simply direct to over-the-counter bottle Yes Gladis Stockton MD   ondansetron (ZOFRAN) 8 MG tablet Take 1 tablet by mouth every 8 hours as needed for Nausea Yes Gladis Stockton MD   Tofacitinib Citrate (XELJANZ XR) 11 MG TB24 Take 11 mg by mouth daily Yes Historical Provider, MD   finasteride (PROSCAR) 5 MG tablet Take 5 mg by mouth daily Yes Historical Provider, MD   sulindac (CLINORIL) 150 MG tablet TAKE 1 TABLET BY MOUTH 2 TIMES DAILY. Yes Historical Provider, MD   aspirin 81 MG chewable tablet Take 81 mg by mouth daily. Yes Historical Provider, MD   omeprazole (PRILOSEC) 20 MG capsule Take 20 mg by mouth Daily  Yes Historical Provider, MD       Social History     Tobacco Use    Smoking status: Former Smoker     Packs/day: 2.00     Years: 15.00     Pack years: 30.00     Quit date: 1993     Years since quittin.6    Smokeless tobacco: Current User     Types: Snuff    Tobacco comment: chews daily   Substance Use Topics    Alcohol use:  Yes     Alcohol/week: 30.0 - 35.0 standard drinks     Types: 30 - 35 Cans of beer per week     Comment: PER WIFE    Drug use: Yes     Types: Marijuana     Comment: couple times per wk        Allergies   Allergen Reactions    Codeine      Not sure of reaction  Pcn [Penicillins]      Not sure of reaction   ,   Past Medical History:   Diagnosis Date    Acid reflux     Arthritis     rheumatoid    Depression     Enlarged prostate     Hearing decreased     does not wear his hearing aid    Hyperlipidemia     no meds    Hypertension     Muscle pain     Rheumatoid arthritis ([address]    ,   Past Surgical History:   Procedure Laterality Date    APPENDECTOMY      CARDIAC CATHETERIZATION      COLONOSCOPY  2013    polyps    COLONOSCOPY  2021    COLONOSCOPY N/A 2021    COLON W/ANES. (9:00) **FIBROSCAN TOO** performed by Zeynep Marshall MD at [address] OTHER SURGICAL HISTORY  2012    muscle biopsy left shoulder    UPPER GASTROINTESTINAL ENDOSCOPY  2013    dysphagia    WRIST SURGERY      left   ,   Social History     Tobacco Use    Smoking status: Former Smoker     Packs/day: 2.00     Years: 15.00     Pack years: 30.00     Quit date: 1993     Years since quittin.6    Smokeless tobacco: Current User     Types: Snuff    Tobacco comment: chews daily   Substance Use Topics    Alcohol use:  Yes     Alcohol/week: 30.0 - 35.0 standard drinks     Types: 30 - 35 Cans of beer per week     Comment: PER WIFE    Drug use: Yes     Types: Marijuana     Comment: couple times per wk   ,   Family History   Problem Relation Age of Onset    Cancer Mother         lung    Stroke Mother         at age 24   Lana Fall Diabetes Mother     Heart Disease Father     High Blood Pressure Father    ,   Immunization History   Administered Date(s) Administered    Influenza Virus Vaccine 2015, 2016    Influenza, Elta Buerger, IM, (6 mo and older Fluzone, Flulaval, Fluarix and 3 yrs and older Afluria) 2017, 2020    PPD Test 2014    Tdap (Boostrix, Adacel) 2018    Tetanus 2015   ,   Health Maintenance   Topic Date Due    Shingles Vaccine (1 of 2) 2007  Annual Wellness Visit (AWV)  06/19/2019    Potassium monitoring  12/16/2021    Creatinine monitoring  12/16/2021    Diabetes screen  07/14/2023    Lipid screen  07/14/2025    DTaP/Tdap/Td vaccine (2 - Td) 04/18/2028    Colon cancer screen colonoscopy  01/18/2031    Flu vaccine  Completed    Hepatitis C screen  Completed    HIV screen  Completed    Hepatitis A vaccine  Aged Out    Hepatitis B vaccine  Aged Out    Hib vaccine  Aged Out    Meningococcal (ACWY) vaccine  Aged Out    Pneumococcal 0-64 years Vaccine  Aged Out       PHYSICAL EXAMINATION:  [ INSTRUCTIONS:  \"[x]\" Indicates a positive item  \"[]\" Indicates a negative item  -- DELETE ALL ITEMS NOT EXAMINED]  Vital Signs: (As obtained by patient/caregiver or practitioner observation)    Due to this being a telehealth encounter, evaluation of the following organ systems/vital signs are limited. Constitutional: [x] Appears well-developed and well-nourished [x] No apparent distress      [] Abnormal-   Mental status  [x] Alert and awake  [x] Oriented to person/place/time [x]Able to follow commands      Eyes:  EOM    [x]  Normal  [] Abnormal-  Sclera  [x]  Normal  [] Abnormal -         Discharge [x]  None visible  [] Abnormal -    HENT:   [x] Normocephalic, atraumatic.   [] Abnormal   [x] Mouth/Throat: Mucous membranes are moist.     External Ears [x] Normal  [] Abnormal-     Neck: [x] No visualized mass     Pulmonary/Chest: [x] Respiratory effort normal.  [x] No visualized signs of difficulty breathing or respiratory distress        [] Abnormal-      Musculoskeletal:   [x] Normal gait with no signs of ataxia         [x] Normal range of motion of neck        [] Abnormal-       Neurological:        [x] No Facial Asymmetry (Cranial nerve 7 motor function) (limited exam to video visit)          [x] No gaze palsy        [] Abnormal-         Skin:        [] No significant exanthematous lesions or discoloration noted on facial skin         [x] Abnormal- Psychiatric:       [x] Normal Affect [x] No Hallucinations        [] Abnormal-     Other pertinent observable physical exam findings-     ASSESSMENT/PLAN:  1. Herpes zoster without complication  Patient presents today via virtual visit with concerns of rash over left eyebrow, and near hairline on the left side of his face. Patient reports that rash is itchy and painful. Patient reports he noticed it on Tuesday. Patient denies any dizziness, lightheadedness, headache or blurred vision. Patient reports feeling chills today but denies any fever, nausea, vomiting or diarrhea. Based upon exam it does appear that patient has shingles rash. I did discuss risks associated with spreading of the rash and ocular involvement. Patient verbalized and acknowledges. Strict return precautions discussed in detail. Patient will be placed on by Cyclovir at this time. Patient offered pain medication during visit but reports that symptoms are okay. Patient aware to follow-up for any new or worsening symptoms.  - valACYclovir (VALTREX) 1 g tablet; Take 1 tablet by mouth 3 times daily for 7 days  Dispense: 21 tablet; Refill: 0      Return if symptoms worsen or fail to improve. Isaac Oleary, was evaluated through a synchronous (real-time) audio-video encounter. The patient (or guardian if applicable) is aware that this is a billable service. Verbal consent to proceed has been obtained within the past 12 months. The visit was conducted pursuant to the emergency declaration under the [facility], [address] [place] [address] authority and the [employer] General Act. Patient identification was verified, and a caregiver was present when appropriate. The patient was located in a state where the provider was credentialed to provide care.     Total time spent on this encounter: Not billed by time    --SATISH Thorpe CNP on 2/26/2021 at 5:14 PM An electronic signature was used to authenticate this note.

## 2021-02-26 NOTE — PATIENT INSTRUCTIONS
Please read the healthy family handout that you were given and share it with your family. Please compare this printed medication list with your medications at home to be sure they are the same. If you have any medications that are different please contact us immediately at [phone]. Also review your allergies that we have listed, these may also include medications that you have not been able to tolerate, make sure everything listed is correct. If you have any allergies that are different please contact us immediately at [phone]. Patient Education        Stopping Smoking: Care Instructions  Your Care Instructions     Cigarette smokers crave the nicotine in cigarettes. Giving it up is much harder than simply changing a habit. Your body has to stop craving the nicotine. It is hard to quit, but you can do it. There are many tools that people use to quit smoking. You may find that combining tools works best for you. There are several steps to quitting. First you get ready to quit. Then you get support to help you. After that, you learn new skills and behaviors to become a nonsmoker. For many people, a necessary step is getting and using medicine. Your doctor will help you set up the plan that best meets your needs. You may want to attend a smoking cessation program to help you quit smoking. When you choose a program, look for one that has proven success. Ask your doctor for ideas.  You will greatly increase your chances of success if you take medicine as well as get counseling or join a cessation program. Some of the changes you feel when you first quit tobacco are uncomfortable. Your body will miss the nicotine at first, and you may feel short-tempered and grumpy. You may have trouble sleeping or concentrating. Medicine can help you deal with these symptoms. You may struggle with changing your smoking habits and rituals. The last step is the tricky one: Be prepared for the smoking urge to continue for a time. This is a lot to deal with, but keep at it. You will feel better. Follow-up care is a key part of your treatment and safety. Be sure to make and go to all appointments, and call your doctor if you are having problems. It's also a good idea to know your test results and keep a list of the medicines you take. How can you care for yourself at home? · Ask your family, friends, and coworkers for support. You have a better chance of quitting if you have help and support. · Join a support group, such as Nicotine Anonymous, for people who are trying to quit smoking. · Consider signing up for a smoking cessation program, such as the American Lung Association's Freedom from Smoking program.  · Get text messaging support. Go to the website at www.smokefree. gov to sign up for the [facility] program.  · Set a quit date. Pick your date carefully so that it is not right in the middle of a big deadline or stressful time. Once you quit, do not even take a puff. Get rid of all ashtrays and lighters after your last cigarette. Clean your house and your clothes so that they do not smell of smoke. · Learn how to be a nonsmoker. Think about ways you can avoid those things that make you reach for a cigarette. ? Avoid situations that put you at greatest risk for smoking. For some people, it is hard to have a drink with friends without smoking. For others, they might skip a coffee break with coworkers who smoke. ? Change your daily routine. Take a different route to work or eat a meal in a different place. · Cut down on stress. Calm yourself or release tension by doing an activity you enjoy, such as reading a book, taking a hot bath, or gardening. · Talk to your doctor or pharmacist about nicotine replacement therapy, which replaces the nicotine in your body. You still get nicotine but you do not use tobacco. Nicotine replacement products help you slowly reduce the amount of nicotine you need. These products come in several forms, many of them available over-the-counter:  ? Nicotine patches  ? Nicotine gum and lozenges  ? Nicotine inhaler  · Ask your doctor about bupropion (Wellbutrin) or varenicline (Chantix), which are prescription medicines. They do not contain nicotine. They help you by reducing withdrawal symptoms, such as stress and anxiety. · Some people find hypnosis, acupuncture, and massage helpful for ending the smoking habit. · Eat a healthy diet and get regular exercise. Having healthy habits will help your body move past its craving for nicotine. · Be prepared to keep trying. Most people are not successful the first few times they try to quit. Do not get mad at yourself if you smoke again. Make a list of things you learned and think about when you want to try again, such as next week, next month, or next year. Where can you learn more? Go to https://RadioScape.Embrace+. org and sign in to your [employer] account. Enter O123 in the [facility] box to learn more about \"Stopping Smoking: Care Instructions. \"     If you do not have an account, please click on the \"Sign Up Now\" link. Current as of: March 12, 2020               Content Version: 12.6  © 1353-1386 [employer]. Care instructions adapted under license by [facility] [employer] [facility] ([facility]). If you have questions about a medical condition or this instruction, always ask your healthcare professional. [address] any warranty or liability for your use of this information.

## 2021-03-10 RX ORDER — METOPROLOL SUCCINATE 25 MG/1
25 TABLET, EXTENDED RELEASE ORAL DAILY
Qty: 90 TABLET | Refills: 0 | Status: SHIPPED | OUTPATIENT
Start: 2021-03-10 | End: 2021-06-08

## 2021-06-01 ENCOUNTER — OFFICE VISIT (OUTPATIENT)
Dept: FAMILY MEDICINE CLINIC | Age: 66
End: 2021-06-01
Payer: MEDICARE

## 2021-06-01 VITALS
BODY MASS INDEX: 28.18 KG/M2 | HEART RATE: 70 BPM | OXYGEN SATURATION: 95 % | SYSTOLIC BLOOD PRESSURE: 119 MMHG | WEIGHT: 196.4 LBS | DIASTOLIC BLOOD PRESSURE: 77 MMHG | TEMPERATURE: 98.2 F

## 2021-06-01 DIAGNOSIS — J01.00 ACUTE NON-RECURRENT MAXILLARY SINUSITIS: ICD-10-CM

## 2021-06-01 DIAGNOSIS — E78.2 MIXED HYPERLIPIDEMIA: ICD-10-CM

## 2021-06-01 DIAGNOSIS — R73.09 ELEVATED GLUCOSE: ICD-10-CM

## 2021-06-01 DIAGNOSIS — R82.998 DARK URINE: ICD-10-CM

## 2021-06-01 DIAGNOSIS — I10 ESSENTIAL HYPERTENSION: Primary | ICD-10-CM

## 2021-06-01 DIAGNOSIS — M06.9 RHEUMATOID ARTHRITIS INVOLVING MULTIPLE SITES, UNSPECIFIED WHETHER RHEUMATOID FACTOR PRESENT (HCC): ICD-10-CM

## 2021-06-01 DIAGNOSIS — Z12.5 PROSTATE CANCER SCREENING: ICD-10-CM

## 2021-06-01 DIAGNOSIS — K70.9 CHRONIC LIVER DISEASE DUE TO ALCOHOL (HCC): ICD-10-CM

## 2021-06-01 DIAGNOSIS — E55.9 VITAMIN D DEFICIENCY: ICD-10-CM

## 2021-06-01 DIAGNOSIS — N40.0 ENLARGED PROSTATE: ICD-10-CM

## 2021-06-01 LAB
BILIRUBIN, POC: NORMAL
BLOOD URINE, POC: NORMAL
CLARITY, POC: NORMAL
COLOR, POC: NORMAL
GLUCOSE URINE, POC: NORMAL
KETONES, POC: NORMAL
LEUKOCYTE EST, POC: NORMAL
NITRITE, POC: NORMAL
PH, POC: 6.5
PROTEIN, POC: NORMAL
SPECIFIC GRAVITY, POC: 1.02
UROBILINOGEN, POC: 1

## 2021-06-01 PROCEDURE — 36415 COLL VENOUS BLD VENIPUNCTURE: CPT | Performed by: NURSE PRACTITIONER

## 2021-06-01 PROCEDURE — 99214 OFFICE O/P EST MOD 30 MIN: CPT | Performed by: NURSE PRACTITIONER

## 2021-06-01 PROCEDURE — 81002 URINALYSIS NONAUTO W/O SCOPE: CPT | Performed by: NURSE PRACTITIONER

## 2021-06-01 RX ORDER — DOXYCYCLINE HYCLATE 100 MG
100 TABLET ORAL 2 TIMES DAILY
Qty: 20 TABLET | Refills: 0 | Status: SHIPPED | OUTPATIENT
Start: 2021-06-01 | End: 2021-06-11

## 2021-06-01 ASSESSMENT — ENCOUNTER SYMPTOMS
ALLERGIC/IMMUNOLOGIC NEGATIVE: 1
SINUS PAIN: 1
RESPIRATORY NEGATIVE: 1
CHEST TIGHTNESS: 0
GASTROINTESTINAL NEGATIVE: 1
COUGH: 0
SHORTNESS OF BREATH: 0
EYES NEGATIVE: 1
SINUS PRESSURE: 1

## 2021-06-01 NOTE — PATIENT INSTRUCTIONS
Please read the healthy family handout that you were given and share it with your family. Please compare this printed medication list with your medications at home to be sure they are the same. If you have any medications that are different please contact us immediately at [phone]. Also review your allergies that we have listed, these may also include medications that you have not been able to tolerate, make sure everything listed is correct. If you have any allergies that are different please contact us immediately at [phone]. Patient Education        Sinusitis: Care Instructions  Your Care Instructions     Sinusitis is an infection of the lining of the sinus cavities in your head. Sinusitis often follows a cold. It causes pain and pressure in your head and face. In most cases, sinusitis gets better on its own in 1 to 2 weeks. But some mild symptoms may last for several weeks. Sometimes antibiotics are needed. Follow-up care is a key part of your treatment and safety. Be sure to make and go to all appointments, and call your doctor if you are having problems. It's also a good idea to know your test results and keep a list of the medicines you take. How can you care for yourself at home? · Take an over-the-counter pain medicine, such as acetaminophen (Tylenol), ibuprofen (Advil, Motrin), or naproxen (Aleve). Read and follow all instructions on the label. · If the doctor prescribed antibiotics, take them as directed. Do not stop taking them just because you feel better. You need to take the full course of antibiotics. · Be careful when taking over-the-counter cold or flu medicines and Tylenol at the same time. Many of these medicines have acetaminophen, which is Tylenol. Read the labels to make sure that you are not taking more than the recommended dose. Too much acetaminophen (Tylenol) can be harmful. · Breathe warm, moist air from a steamy shower, a hot bath, or a sink filled with hot water. Avoid cold, dry air. Using a humidifier in your home may help. Follow the directions for cleaning the machine. · Use saline (saltwater) nasal washes. This can help keep your nasal passages open and wash out mucus and bacteria. You can buy saline nose drops at a grocery store or drugstore. Or you can make your own at home by adding 1 teaspoon of salt and 1 teaspoon of baking soda to 2 cups of distilled water. If you make your own, fill a bulb syringe with the solution, insert the tip into your nostril, and squeeze gently. Luz Double your nose. · Put a hot, wet towel or a warm gel pack on your face 3 or 4 times a day for 5 to 10 minutes each time. · Try a decongestant nasal spray like oxymetazoline (Afrin). Do not use it for more than 3 days in a row. Using it for more than 3 days can make your congestion worse. When should you call for help? Call your doctor now or seek immediate medical care if:    · You have new or worse swelling or redness in your face or around your eyes.     · You have a new or higher fever. Watch closely for changes in your health, and be sure to contact your doctor if:    · You have new or worse facial pain.     · The mucus from your nose becomes thicker (like pus) or has new blood in it.     · You are not getting better as expected. Where can you learn more? Go to https://[employer]. org and sign in to your [employer] account. Enter H907 in the [facility] box to learn more about \"Sinusitis: Care Instructions. \"     If you do not have an account, please click on the \"Sign Up Now\" link. Current as of: December 2, 2020               Content Version: 12.8  © 2006-2021 Healthwise, Incorporated. Care instructions adapted under license by [facility] [employer] [facility] ([facility]). If you have questions about a medical condition or this instruction, always ask your healthcare professional. [address] any warranty or liability for your use of this information. Patient Education        doxycycline (oral/injection)  Pronunciation:  DOX madie love  Brand:  Acticlate, Adoxa, Alodox, Avidoxy, Doryx, Mondoxyne NL, Monodox, Morgidox, Okebo, Oracea, Oraxyl, Targadox, Vibramycin  What is the most important information I should know about doxycycline? You should not take this medicine if you are allergic to any tetracycline antibiotic. Children younger than 6years old should use doxycycline only in cases of severe or life-threatening conditions. This medicine can cause permanent yellowing or graying of the teeth in children  Using doxycycline during pregnancy could harm the unborn baby or cause permanent tooth discoloration later in the baby's life. What is doxycycline? Doxycycline is a tetracycline antibiotic that  Doxycycline is used to treat many different bacterial infections, such as acne, urinary tract infections, intestinal infections, eye infections, gonorrhea, chlamydia, periodontitis (gum disease), and others. Doxycycline is also used to treat blemishes, bumps, and acne-like lesions caused by rosacea. Doxycycline will not treat facial redness caused by rosacea. Some forms of doxycycline are used to prevent malaria, to treat anthrax, or to treat infections caused by mites, ticks, or lice. Doxycycline may also be used for purposes not listed in this medication guide. What should I discuss with my healthcare provider before taking doxycycline? You should not take this medicine if you are allergic to doxycycline or other tetracycline antibiotics such as demeclocycline, minocycline, tetracycline, or tigecycline. Tell your doctor if you have ever had:  · liver disease;  · kidney disease;  · asthma or sulfite allergy;  · increased pressure inside your skull; or  · if you also take isotretinoin, seizure medicine, or a blood thinner such as warfarin (Coumadin).   If you are using doxycycline to treat gonorrhea, your doctor may test you to make sure you do not also have syphilis, another sexually transmitted disease. Taking this medicine during pregnancy may affect tooth and bone development in the unborn baby. Taking doxycycline during the last half of pregnancy can cause permanent tooth discoloration later in the baby's life. Tell your doctor if you are pregnant or if you become pregnant. Doxycycline can make birth control pills less effective. Ask your doctor about using a non-hormonal birth control (condom, diaphragm with spermicide) to prevent pregnancy. Doxycycline can pass into breast milk and may affect bone and tooth development in a nursing infant. Do not breastfeed while you are taking doxycycline. Doxycycline can cause permanent yellowing or graying of the teeth in children younger than 6years old. Children should use doxycycline only in cases of severe or life-threatening conditions such as anthrax or [facility] spotted fever. The benefit of treating a serious condition may outweigh any risks to the child's tooth development. How should I take doxycycline? Follow all directions on your prescription label and read all medication guides or instruction sheets. Use the medicine exactly as directed. Take doxycycline with a full glass of water. Drink plenty of liquids while you are taking doxycycline. Read and carefully follow any Instructions for Use provided with your medicine. Ask your doctor or pharmacist if you do not understand these instructions. Most brands of doxycyline may be taken with food or milk if the medicine upsets your stomach. Different brands of doxycycline may have different instructions about taking them with or without food. Take Oracea on an empty stomach, at least 1 hour before or 2 hours after a meal.  You may need to split a doxycycline tablet to get the correct dose. Follow your doctor's instructions. Swallow a delayed-release capsule or tablet whole. Do not crush, chew, break, or open it.   Measure liquid medicine  with the dosing syringe provided, or with a special dose-measuring spoon or medicine cup. If you do not have a dose-measuring device, ask your pharmacist for one. If you take doxycycline to prevent malaria: Start taking the medicine 1 or 2 days before entering an area where malaria is common. Continue taking the medicine every day during your stay and for at least 4 weeks after you leave the area. Doxycycline is usually given by injection only if you are unable to take the medicine by mouth. A healthcare provider will give you this injection as an infusion into a vein. Use this medicine for the full prescribed length of time, even if your symptoms quickly improve. Skipping doses can increase your risk of infection that is resistant to medication. Doxycycline will not treat a viral infection such as the flu or a common cold. Store at room temperature away from moisture, heat, and light. Throw away any unused medicine after the expiration date on the label has passed. Using  doxycycline can cause damage to your kidneys. What happens if I miss a dose? Take the medicine as soon as you can, but skip the missed dose if it is almost time for your next dose. Do not take two doses at one time. What happens if I overdose? Seek emergency medical attention or call the Poison Help line at 1-466.450.6270. What should I avoid while taking doxycycline? Do not take iron supplements, multivitamins, calcium supplements, antacids, or laxatives within 2 hours before or after taking doxycycline. Avoid taking any other antibiotics with doxycycline unless your doctor has told you to. Doxycycline could make you sunburn more easily. Avoid sunlight or tanning beds. Wear protective clothing and use sunscreen (SPF 30 or higher) when you are outdoors. Antibiotic medicines can cause diarrhea, which may be a sign of a new infection. If you have diarrhea that is watery or bloody, call your doctor.  Do not use anti-diarrhea medicine unless your doctor tells you to. What are the possible side effects of doxycycline? Get emergency medical help if you have signs of an allergic reaction (hives, difficult breathing, swelling in your face or throat) or a severe skin reaction (fever, sore throat, burning in your eyes, skin pain, red or purple skin rash that spreads and causes blistering and peeling). Seek medical treatment if you have a serious drug reaction that can affect many parts of your body. Symptoms may include: skin rash, fever, swollen glands, flu-like symptoms, muscle aches, severe weakness, unusual bruising, or yellowing of your skin or eyes. This reaction may occur several weeks after you began using doxycycline. Call your doctor at once if you have:  · severe stomach pain, diarrhea that is watery or bloody;  · throat irritation, trouble swallowing;  · chest pain, irregular heart rhythm, feeling short of breath;  · little or no urination;  · low white blood cell counts --fever, chills, swollen glands, body aches, weakness, pale skin, easy bruising or bleeding;  · increased pressure inside the skull --severe headaches, ringing in your ears, dizziness, nausea, vision problems, pain behind your eyes; or  · signs of liver or pancreas problems --loss of appetite, upper stomach pain (that may spread to your back), tiredness, nausea or vomiting, fast heart rate, dark urine, jaundice (yellowing of the skin or eyes). Common side effects may include:  · nausea, vomiting, upset stomach, loss of appetite;  · mild diarrhea;  · skin rash or itching;  · darkened skin color; or  · vaginal itching or discharge. This is not a complete list of side effects and others may occur. Call your doctor for medical advice about side effects. You may report side effects to FDA at 3-261-FDA-5010. What other drugs will affect doxycycline? Sometimes it is not safe to use certain medications at the same time.  Some drugs can affect your blood levels of other drugs you take, which may increase side effects or make the medications less effective. Other drugs may affect doxycycline, including prescription and over-the-counter medicines, vitamins, and herbal products. Tell your doctor about all your current medicines and any medicine you start or stop using. Where can I get more information? Your pharmacist can provide more information about doxycycline. Remember, keep this and all other medicines out of the reach of children, never share your medicines with others, and use this medication only for the indication prescribed. Every effort has been made to ensure that the information provided by [address] is accurate, up-to-date, and complete, but no guarantee is made to that effect. Drug information contained herein may be time sensitive. [facility] information has been compiled for use by healthcare practitioners and consumers in the [place] and therefore [facility] does not warrant that uses outside of the [place] are appropriate, unless specifically indicated otherwise. [facility]'s drug information does not endorse drugs, diagnose patients or recommend therapy. [facility] drug information is an informational resource designed to assist licensed healthcare practitioners in caring for their patients and/or to serve consumers viewing this service as a supplement to, and not a substitute for, the expertise, skill, knowledge and judgment of healthcare practitioners. The absence of a warning for a given drug or drug combination in no way should be construed to indicate that the drug or drug combination is safe, effective or appropriate for any given patient. [facility] does not assume any responsibility for any aspect of healthcare administered with the aid of information [facility] provides. The information contained herein is not intended to cover all possible uses, directions, precautions, warnings, drug interactions, allergic reactions, or adverse effects.  If you have questions about the drugs you are taking, check with your doctor, nurse or pharmacist.  Copyright 1856-0664 [address]: 21.04. Revision date: 11/4/2020. Care instructions adapted under license by [facility] ([facility]). If you have questions about a medical condition or this instruction, always ask your healthcare professional. [place] [address] any warranty or liability for your use of this information.

## 2021-06-01 NOTE — PROGRESS NOTES
Subjective:      Patient ID: Jeanne Rosales is a 61 y.o. male. Chief Complaint   Patient presents with    Check-Up    Hyperlipidemia    Hypertension    Headache        HPI   Patient presents today to follow-up on chronic conditions and with complaints of sinus pain/pressure and headache over the past week. Sinus Pain  Patient complains of headaches and sinus pressure. Onset of symptoms was 1 week ago. Symptoms have been gradually worsening since that time. He is drinking plenty of fluids. Past history is significant for nothing. Patient is non-smoker. Treatment Adherence:   Medication compliance:  compliant most of the time  Diet compliance:  noncompliant: most of the time  Weight trend: stable  Current exercise: no regular exercise  Barriers: impairment:  physical: RA - chronic joint pain    Hypertension:  Home blood pressure monitorin 's over 70-80's. Patient denies chest pain, shortness of breath, blurred vision, peripheral edema, palpitations, dry cough and fatigue however does have intermittent lightheadedness however this does subside quickly. Antihypertensive medication side effects: no medication side effects noted. Use of agents associated with hypertension: none. Sodium (mmol/L)   Date Value   2020 136    BUN (mg/dL)   Date Value   2020 16    Glucose (mg/dL)   Date Value   2020 123 (H)      Potassium (mmol/L)   Date Value   2020 4.1     Potassium reflex Magnesium (mmol/L)   Date Value   2019 4.1    CREATININE (mg/dL)   Date Value   2020 0.9         Hyperlipidemia:  Patient is not currently on any medication due to liver disease.      Lab Results   Component Value Date    CHOL 169 2020    TRIG 186 (H) 2020    HDL 47 2020    LDLCALC 85 2020    LDLDIRECT 99 2019     Lab Results   Component Value Date     (H) 2020    AST 84 (H) 2020        Review of Systems Constitutional: Negative. Negative for activity change, appetite change, chills, fever and unexpected weight change. HENT: Positive for sinus pressure and sinus pain. Negative for sneezing. Eyes: Negative. Respiratory: Negative. Negative for cough, chest tightness and shortness of breath. Cardiovascular: Negative. Negative for chest pain, palpitations and leg swelling. Gastrointestinal: Negative. Endocrine: Negative. Genitourinary: Negative. Skin: Negative. Allergic/Immunologic: Negative. Neurological: Positive for headaches. Negative for dizziness. Psychiatric/Behavioral: Negative. Patient Active Problem List   Diagnosis    Muscular weakness    Depression    Acid reflux    Hyperlipidemia    Hypertension    Muscle pain    Hearing decreased    Arthritis    Enlarged prostate    Chronic fatigue    Dizziness    Palpitations    Rheumatoid arthritis ([address]    History of tobacco abuse    Hx of colonic polyps    Fatty liver       No outpatient medications have been marked as taking for the 21 encounter (Appointment) with SATISH Riggins CNP. Allergies   Allergen Reactions    Codeine      Not sure of reaction    Pcn [Penicillins]      Not sure of reaction       Social History     Tobacco Use    Smoking status: Former Smoker     Packs/day: 2.00     Years: 15.00     Pack years: 30.00     Quit date: 1993     Years since quittin.8    Smokeless tobacco: Current User     Types: Snuff    Tobacco comment: chews daily   Substance Use Topics    Alcohol use: Yes     Alcohol/week: 30.0 - 35.0 standard drinks     Types: 30 - 35 Cans of beer per week     Comment: PER WIFE       Objective:   /77   Pulse 70   Temp 98.2 °F (36.8 °C) (Oral)   Wt 196 lb 6.4 oz (89.1 kg)   SpO2 95%   BMI 28.18 kg/m²     Physical Exam  Vitals and nursing note reviewed. Constitutional:       General: He is not in acute distress.      Appearance: Normal intermittently at home and reports it has been well controlled up 120s over 70 to 80s. Patient denies any chest pain, shortness of breath, blurred vision, palpitations or peripheral edema. Patient is taking medications as prescribed. Recommend patient continue with metoprolol, amlodipine and lisinopril as ordered. - CBC Auto Differential  - Comprehensive Metabolic Panel    2. Acute non-recurrent maxillary sinusitis  Patient with complaints of maxillary sinus congestion, tenderness and headache over the past 1 week. Patient reports increased pressure with bending forward. On exam noted maxillary sinus tenderness. Patient is currently on Jamilah Peat form RA. Recommend treatment as below. Advised to drink plenty of fluids, take medication as prescribed and patient to follow-up if no better worsening of symptoms.  - doxycycline hyclate (VIBRA-TABS) 100 MG tablet; Take 1 tablet by mouth 2 times daily for 10 days  Dispense: 20 tablet; Refill: 0    3. Dark urine  Patient reports urine has been darker recently however denies dysuria, changes in urination or frequency. UA is negative. Advised patient to increase water intake. Patient agreeable. - POCT Urinalysis no Micro    4. Mixed hyperlipidemia  Patient is currently off medication for cholesterol due to stage II liver disease. 5. Rheumatoid arthritis involving multiple sites, unspecified whether rheumatoid factor present [facility])  Patient is currently on Jamilah Peat and is following with rheumatology. Chronic joint pain. 6. Enlarged prostate  Currently asymptomatic. 7. Vitamin D deficiency  - Vitamin D 25 Hydroxy    8. Prostate cancer screening  Agreeable to have PSA for prostate cancer screening.   - PSA screening    9.  Elevated glucose  - Hemoglobin A1C

## 2021-06-02 LAB
A/G RATIO: 1.7 (ref 1.1–2.2)
ALBUMIN SERPL-MCNC: 4.5 G/DL (ref 3.4–5)
ALP BLD-CCNC: 86 U/L (ref 40–129)
ALT SERPL-CCNC: 78 U/L (ref 10–40)
ANION GAP SERPL CALCULATED.3IONS-SCNC: 12 MMOL/L (ref 3–16)
AST SERPL-CCNC: 63 U/L (ref 15–37)
BASOPHILS ABSOLUTE: 0 K/UL (ref 0–0.2)
BASOPHILS RELATIVE PERCENT: 0.2 %
BILIRUB SERPL-MCNC: 0.4 MG/DL (ref 0–1)
BUN BLDV-MCNC: 12 MG/DL (ref 7–20)
CALCIUM SERPL-MCNC: 9.3 MG/DL (ref 8.3–10.6)
CHLORIDE BLD-SCNC: 102 MMOL/L (ref 99–110)
CO2: 24 MMOL/L (ref 21–32)
CREAT SERPL-MCNC: 0.8 MG/DL (ref 0.8–1.3)
EOSINOPHILS ABSOLUTE: 0 K/UL (ref 0–0.6)
EOSINOPHILS RELATIVE PERCENT: 0.9 %
ESTIMATED AVERAGE GLUCOSE: 111.2 MG/DL
GFR AFRICAN AMERICAN: >60
GFR NON-AFRICAN AMERICAN: >60
GLOBULIN: 2.6 G/DL
GLUCOSE BLD-MCNC: 87 MG/DL (ref 70–99)
HBA1C MFR BLD: 5.5 %
HCT VFR BLD CALC: 38.3 % (ref 40.5–52.5)
HEMOGLOBIN: 13.3 G/DL (ref 13.5–17.5)
LYMPHOCYTES ABSOLUTE: 1 K/UL (ref 1–5.1)
LYMPHOCYTES RELATIVE PERCENT: 18.8 %
MCH RBC QN AUTO: 35 PG (ref 26–34)
MCHC RBC AUTO-ENTMCNC: 34.8 G/DL (ref 31–36)
MCV RBC AUTO: 100.4 FL (ref 80–100)
MONOCYTES ABSOLUTE: 0.4 K/UL (ref 0–1.3)
MONOCYTES RELATIVE PERCENT: 8 %
NEUTROPHILS ABSOLUTE: 3.7 K/UL (ref 1.7–7.7)
NEUTROPHILS RELATIVE PERCENT: 72.1 %
PDW BLD-RTO: 14 % (ref 12.4–15.4)
PLATELET # BLD: 211 K/UL (ref 135–450)
PMV BLD AUTO: 8.3 FL (ref 5–10.5)
POTASSIUM SERPL-SCNC: 4.2 MMOL/L (ref 3.5–5.1)
PROSTATE SPECIFIC ANTIGEN: 0.24 NG/ML (ref 0–4)
RBC # BLD: 3.81 M/UL (ref 4.2–5.9)
SODIUM BLD-SCNC: 138 MMOL/L (ref 136–145)
TOTAL PROTEIN: 7.1 G/DL (ref 6.4–8.2)
VITAMIN D 25-HYDROXY: 31.8 NG/ML
WBC # BLD: 5.1 K/UL (ref 4–11)

## 2021-06-08 RX ORDER — METOPROLOL SUCCINATE 25 MG/1
25 TABLET, EXTENDED RELEASE ORAL DAILY
Qty: 90 TABLET | Refills: 1 | Status: SHIPPED | OUTPATIENT
Start: 2021-06-08 | End: 2021-11-30

## 2021-07-30 DIAGNOSIS — F33.42 RECURRENT MAJOR DEPRESSIVE DISORDER, IN FULL REMISSION (HCC): ICD-10-CM

## 2021-07-30 RX ORDER — FLUOXETINE HYDROCHLORIDE 40 MG/1
CAPSULE ORAL
Qty: 180 CAPSULE | Refills: 1 | Status: SHIPPED | OUTPATIENT
Start: 2021-07-30 | End: 2021-12-06 | Stop reason: SDUPTHER

## 2021-08-09 RX ORDER — TERAZOSIN 5 MG/1
CAPSULE ORAL
Qty: 90 CAPSULE | Refills: 1 | Status: SHIPPED | OUTPATIENT
Start: 2021-08-09 | End: 2021-12-06 | Stop reason: SDUPTHER

## 2021-08-09 NOTE — TELEPHONE ENCOUNTER
Future appt scheduled 12/1/21  Last appt 6/1/21  Refilled medication per verbal order from provider.

## 2021-08-21 ENCOUNTER — HOSPITAL ENCOUNTER (EMERGENCY)
Age: 66
Discharge: HOME | End: 2021-08-22
Attending: STUDENT IN AN ORGANIZED HEALTH CARE EDUCATION/TRAINING PROGRAM
Payer: MEDICARE

## 2021-08-21 ENCOUNTER — APPOINTMENT (OUTPATIENT)
Dept: GENERAL RADIOLOGY | Age: 66
End: 2021-08-21
Payer: MEDICARE

## 2021-08-21 DIAGNOSIS — Z20.822 SUSPECTED COVID-19 VIRUS INFECTION: ICD-10-CM

## 2021-08-21 DIAGNOSIS — J18.9 PNEUMONIA OF LEFT LOWER LOBE DUE TO INFECTIOUS ORGANISM: Primary | ICD-10-CM

## 2021-08-21 LAB
A/G RATIO: 1.3 (ref 1.1–2.2)
ALBUMIN SERPL-MCNC: 4.2 G/DL (ref 3.4–5)
ALP BLD-CCNC: 82 U/L (ref 40–129)
ALT SERPL-CCNC: 60 U/L (ref 10–40)
ANION GAP SERPL CALCULATED.3IONS-SCNC: 11 MMOL/L (ref 3–16)
AST SERPL-CCNC: 44 U/L (ref 15–37)
BASOPHILS ABSOLUTE: 0 K/UL (ref 0–0.2)
BASOPHILS RELATIVE PERCENT: 0.3 %
BILIRUB SERPL-MCNC: 0.6 MG/DL (ref 0–1)
BUN BLDV-MCNC: 14 MG/DL (ref 7–20)
CALCIUM SERPL-MCNC: 9.4 MG/DL (ref 8.3–10.6)
CHLORIDE BLD-SCNC: 100 MMOL/L (ref 99–110)
CO2: 24 MMOL/L (ref 21–32)
CREAT SERPL-MCNC: 0.7 MG/DL (ref 0.8–1.3)
EOSINOPHILS ABSOLUTE: 0 K/UL (ref 0–0.6)
EOSINOPHILS RELATIVE PERCENT: 0 %
GFR AFRICAN AMERICAN: >60
GFR NON-AFRICAN AMERICAN: >60
GLOBULIN: 3.3 G/DL
GLUCOSE BLD-MCNC: 104 MG/DL (ref 70–99)
HCT VFR BLD CALC: 37.7 % (ref 40.5–52.5)
HEMOGLOBIN: 13 G/DL (ref 13.5–17.5)
LYMPHOCYTES ABSOLUTE: 0.4 K/UL (ref 1–5.1)
LYMPHOCYTES RELATIVE PERCENT: 4.6 %
MCH RBC QN AUTO: 35 PG (ref 26–34)
MCHC RBC AUTO-ENTMCNC: 34.5 G/DL (ref 31–36)
MCV RBC AUTO: 101.6 FL (ref 80–100)
MONOCYTES ABSOLUTE: 0.6 K/UL (ref 0–1.3)
MONOCYTES RELATIVE PERCENT: 6.5 %
NEUTROPHILS ABSOLUTE: 8.1 K/UL (ref 1.7–7.7)
NEUTROPHILS RELATIVE PERCENT: 88.6 %
PDW BLD-RTO: 14.6 % (ref 12.4–15.4)
PLATELET # BLD: 219 K/UL (ref 135–450)
PMV BLD AUTO: 7.1 FL (ref 5–10.5)
POTASSIUM REFLEX MAGNESIUM: 3.9 MMOL/L (ref 3.5–5.1)
RAPID INFLUENZA  B AGN: NEGATIVE
RAPID INFLUENZA A AGN: NEGATIVE
RBC # BLD: 3.71 M/UL (ref 4.2–5.9)
SODIUM BLD-SCNC: 135 MMOL/L (ref 136–145)
TOTAL PROTEIN: 7.5 G/DL (ref 6.4–8.2)
TROPONIN: <0.01 NG/ML
WBC # BLD: 9.2 K/UL (ref 4–11)

## 2021-08-21 PROCEDURE — 84484 ASSAY OF TROPONIN QUANT: CPT

## 2021-08-21 PROCEDURE — 71045 X-RAY EXAM CHEST 1 VIEW: CPT

## 2021-08-21 PROCEDURE — 80053 COMPREHEN METABOLIC PANEL: CPT

## 2021-08-21 PROCEDURE — 36415 COLL VENOUS BLD VENIPUNCTURE: CPT

## 2021-08-21 PROCEDURE — U0005 INFEC AGEN DETEC AMPLI PROBE: HCPCS

## 2021-08-21 PROCEDURE — 85025 COMPLETE CBC W/AUTO DIFF WBC: CPT

## 2021-08-21 PROCEDURE — 87804 INFLUENZA ASSAY W/OPTIC: CPT

## 2021-08-21 PROCEDURE — 93005 ELECTROCARDIOGRAM TRACING: CPT | Performed by: STUDENT IN AN ORGANIZED HEALTH CARE EDUCATION/TRAINING PROGRAM

## 2021-08-21 PROCEDURE — 99284 EMERGENCY DEPT VISIT MOD MDM: CPT

## 2021-08-21 PROCEDURE — U0003 INFECTIOUS AGENT DETECTION BY NUCLEIC ACID (DNA OR RNA); SEVERE ACUTE RESPIRATORY SYNDROME CORONAVIRUS 2 (SARS-COV-2) (CORONAVIRUS DISEASE [COVID-19]), AMPLIFIED PROBE TECHNIQUE, MAKING USE OF HIGH THROUGHPUT TECHNOLOGIES AS DESCRIBED BY CMS-2020-01-R: HCPCS

## 2021-08-22 VITALS
DIASTOLIC BLOOD PRESSURE: 86 MMHG | SYSTOLIC BLOOD PRESSURE: 138 MMHG | OXYGEN SATURATION: 93 % | TEMPERATURE: 99.2 F | HEART RATE: 81 BPM | BODY MASS INDEX: 28.63 KG/M2 | WEIGHT: 200 LBS | RESPIRATION RATE: 17 BRPM | HEIGHT: 70 IN

## 2021-08-22 LAB
EKG ATRIAL RATE: 79 BPM
EKG DIAGNOSIS: NORMAL
EKG P AXIS: 43 DEGREES
EKG P-R INTERVAL: 150 MS
EKG Q-T INTERVAL: 396 MS
EKG QRS DURATION: 84 MS
EKG QTC CALCULATION (BAZETT): 454 MS
EKG R AXIS: 53 DEGREES
EKG T AXIS: 51 DEGREES
EKG VENTRICULAR RATE: 79 BPM
SARS-COV-2: NOT DETECTED

## 2021-08-22 PROCEDURE — 93010 ELECTROCARDIOGRAM REPORT: CPT | Performed by: INTERNAL MEDICINE

## 2021-08-22 PROCEDURE — 6370000000 HC RX 637 (ALT 250 FOR IP): Performed by: STUDENT IN AN ORGANIZED HEALTH CARE EDUCATION/TRAINING PROGRAM

## 2021-08-22 RX ORDER — BENZONATATE 100 MG/1
100 CAPSULE ORAL 3 TIMES DAILY PRN
Qty: 30 CAPSULE | Refills: 0 | Status: SHIPPED | OUTPATIENT
Start: 2021-08-22 | End: 2021-08-25 | Stop reason: DRUGHIGH

## 2021-08-22 RX ORDER — DOXYCYCLINE HYCLATE 100 MG
100 TABLET ORAL 2 TIMES DAILY
Qty: 10 TABLET | Refills: 0 | Status: SHIPPED | OUTPATIENT
Start: 2021-08-22 | End: 2021-08-27

## 2021-08-22 RX ORDER — DOXYCYCLINE HYCLATE 100 MG
100 TABLET ORAL ONCE
Status: COMPLETED | OUTPATIENT
Start: 2021-08-22 | End: 2021-08-22

## 2021-08-22 RX ADMIN — DOXYCYCLINE HYCLATE 100 MG: 100 TABLET, COATED ORAL at 00:10

## 2021-08-22 NOTE — ED NOTES
Pt ambulated in room without difficulties, no change in condition. O2 saturation remained 92% on RA.       Howie Hopson RN  08/21/21 [id number]

## 2021-08-22 NOTE — ED PROVIDER NOTES
[facility]      CHIEF COMPLAINT  Cough and Fever     HISTORY OF PRESENT ILLNESS  Molly Vasquez is a 61 y.o. male  who presents to the ED complaining of cough and fever for the past 24 hours. Patient states that he was exposed to somebody who may potentially have Covid. He was vaccinated. He states that since last night, he has had a cough productive of small amount of whitish sputum, also with associated fevers up to 102 at home. He states that he cannot take any antipyretics secondary to history of liver disease has not taken any Tylenol or ibuprofen for symptoms. He states that he is having some chest discomfort in the center of his chest, mostly when he coughs. He denies any associated shortness of breath. Abdominal pain, nausea vomiting, or other complaints or concerns. He does have a history of RA and takes Tofacitinib    No other complaints, modifying factors or associated symptoms. I have reviewed the following from the nursing documentation. Past Medical History:   Diagnosis Date    Acid reflux     Arthritis     rheumatoid    Depression     Enlarged prostate     Hearing decreased     does not wear his hearing aid    Hyperlipidemia     no meds    Hypertension     Liver disease     Stage 2 per pt     Muscle pain     Rheumatoid arthritis [facility])      Past Surgical History:   Procedure Laterality Date    APPENDECTOMY      CARDIAC CATHETERIZATION      COLONOSCOPY  7/25/2013    polyps    COLONOSCOPY  01/18/2021    COLONOSCOPY N/A 1/18/2021    COLON W/ANES.  (9:00) **FIBROSCAN TOO** performed by Yasemin Melendrez MD at [address] OTHER SURGICAL HISTORY  09/27/2012    muscle biopsy left shoulder    UPPER GASTROINTESTINAL ENDOSCOPY  7/25/2013    dysphagia    WRIST SURGERY      left     Family History   Problem Relation Age of Onset    Cancer Mother         lung    Stroke Mother         at age 24   [place] Diabetes Mother     Heart Disease Father     High Blood Pressure Father      Social History     Socioeconomic History    Marital status:      Spouse name: Not on file    Number of children: Not on file    Years of education: Not on file    Highest education level: Not on file   Occupational History    Not on file   Tobacco Use    Smoking status: Former Smoker     Packs/day: 2.00     Years: 15.00     Pack years: 30.00     Quit date: 1993     Years since quittin.0    Smokeless tobacco: Current User     Types: Snuff    Tobacco comment: chews daily   Vaping Use    Vaping Use: Never used   Substance and Sexual Activity    Alcohol use: Yes     Alcohol/week: 30.0 - 35.0 standard drinks     Types: 30 - 35 Cans of beer per week     Comment: PER WIFE    Drug use: Yes     Types: Marijuana     Comment: couple times per wk    Sexual activity: Yes     Partners: Female   Other Topics Concern    Not on file   Social History Narrative    Not on file     Social Determinants of Health     Financial Resource Strain:     Difficulty of Paying Living Expenses:    Food Insecurity:     Worried About Running Out of Food in the Last Year:     Ran Out of Food in the Last Year:    Transportation Needs:     Lack of Transportation (Medical):  Lack of Transportation (Non-Medical):    Physical Activity:     Days of Exercise per Week:     Minutes of Exercise per Session:    Stress:     Feeling of Stress :    Social Connections:     Frequency of Communication with Friends and Family:     Frequency of Social Gatherings with Friends and Family:     Attends Hinduism Services:     Active Member of Clubs or Organizations:     Attends Club or Organization Meetings:     Marital Status:    Intimate Partner Violence:     Fear of Current or Ex-Partner:     Emotionally Abused:     Physically Abused:     Sexually Abused:      No current facility-administered medications for this encounter.      Current Outpatient Medications Medication Sig Dispense Refill    doxycycline hyclate (VIBRA-TABS) 100 MG tablet Take 1 tablet by mouth 2 times daily for 5 days 10 tablet 0    benzonatate (TESSALON) 100 MG capsule Take 1 capsule by mouth 3 times daily as needed for Cough 30 capsule 0    terazosin (HYTRIN) 5 MG capsule TAKE 1 CAPSULE BY MOUTH EVERY DAY 90 capsule 1    FLUoxetine (PROZAC) 40 MG capsule TAKE 2 CAPSULES BY MOUTH EVERY  capsule 1    metoprolol succinate (TOPROL XL) 25 MG extended release tablet TAKE 1 TABLET BY MOUTH DAILY 90 tablet 1    vitamin B-12 (CYANOCOBALAMIN) 500 MCG tablet Take 500 mcg by mouth daily      polycarbophil (FIBERCON) 625 MG tablet Take 625 mg by mouth daily       amLODIPine (NORVASC) 5 MG tablet Take 1 tablet by mouth daily 90 tablet 3    lisinopril (PRINIVIL;ZESTRIL) 30 MG tablet TAKE 1 TABLET BY MOUTH EVERY DAY 90 tablet 3    potassium chloride (KLOR-CON 10) 10 MEQ extended release tablet Take 1 tablet by mouth daily 90 tablet 3    tamsulosin (FLOMAX) 0.4 MG capsule Take 0.4 mg by mouth daily      Tofacitinib Citrate (XELJANZ XR) 11 MG TB24 Take 11 mg by mouth daily      finasteride (PROSCAR) 5 MG tablet Take 5 mg by mouth daily      sulindac (CLINORIL) 150 MG tablet TAKE 1 TABLET BY MOUTH 2 TIMES DAILY. 11    aspirin 81 MG chewable tablet Take 81 mg by mouth daily.  omeprazole (PRILOSEC) 20 MG capsule Take 20 mg by mouth Daily        Allergies   Allergen Reactions    Codeine      Not sure of reaction    Pcn [Penicillins]      Not sure of reaction       REVIEW OF SYSTEMS  10 systems reviewed, pertinent positives per HPI otherwise noted to be negative. PHYSICAL EXAM  /86   Pulse 81   Temp 99.2 °F (37.3 °C) (Oral)   Resp 17   Ht 5' 10\" (1.778 m)   Wt 200 lb (90.7 kg)   SpO2 93%   BMI 28.70 kg/m²    GENERAL APPEARANCE: Awake and alert. Cooperative. No acute distress. HENT: Normocephalic. Atraumatic. NECK: Supple. EYES: PERRL. EOM's grossly intact.   HEART/CHEST: RRR. No murmurs. LUNGS: Respirations unlabored. CTAB. Good air exchange. Speaking comfortably in full sentences. ABDOMEN: No tenderness. Soft. Non-distended. No masses. No organomegaly. No guarding or rebound. MUSCULOSKELETAL: No extremity edema. Compartments soft. No deformity. No tenderness in the extremities. All extremities neurovascularly intact. SKIN: Warm and dry. No acute rashes. NEUROLOGICAL: Alert and oriented. CN's 2-12 intact. No gross facial drooping. Strength 5/5, sensation intact. Gait normal.  PSYCHIATRIC: Normal mood and affect. LABS  I have reviewed all labs for this visit.    Results for orders placed or performed during the hospital encounter of 08/21/21   Rapid influenza A/B antigens    Specimen: Nasopharyngeal   Result Value Ref Range    Rapid Influenza A Ag Negative Negative    Rapid Influenza B Ag Negative Negative   CBC Auto Differential   Result Value Ref Range    WBC 9.2 4.0 - 11.0 K/uL    RBC 3.71 (L) 4.20 - 5.90 M/uL    Hemoglobin 13.0 (L) 13.5 - 17.5 g/dL    Hematocrit 37.7 (L) 40.5 - 52.5 %    .6 (H) 80.0 - 100.0 fL    MCH 35.0 (H) 26.0 - 34.0 pg    MCHC 34.5 31.0 - 36.0 g/dL    RDW 14.6 12.4 - 15.4 %    Platelets 739 997 - 502 K/uL    MPV 7.1 5.0 - 10.5 fL    Neutrophils % 88.6 %    Lymphocytes % 4.6 %    Monocytes % 6.5 %    Eosinophils % 0.0 %    Basophils % 0.3 %    Neutrophils Absolute 8.1 (H) 1.7 - 7.7 K/uL    Lymphocytes Absolute 0.4 (L) 1.0 - 5.1 K/uL    Monocytes Absolute 0.6 0.0 - 1.3 K/uL    Eosinophils Absolute 0.0 0.0 - 0.6 K/uL    Basophils Absolute 0.0 0.0 - 0.2 K/uL   Comprehensive Metabolic Panel w/ Reflex to MG   Result Value Ref Range    Sodium 135 (L) 136 - 145 mmol/L    Potassium reflex Magnesium 3.9 3.5 - 5.1 mmol/L    Chloride 100 99 - 110 mmol/L    CO2 24 21 - 32 mmol/L    Anion Gap 11 3 - 16    Glucose 104 (H) 70 - 99 mg/dL    BUN 14 7 - 20 mg/dL    CREATININE 0.7 (L) 0.8 - 1.3 mg/dL    GFR Non-African American >60 >60    GFR  American >60 >60    Calcium 9.4 8.3 - 10.6 mg/dL    Total Protein 7.5 6.4 - 8.2 g/dL    Albumin 4.2 3.4 - 5.0 g/dL    Albumin/Globulin Ratio 1.3 1.1 - 2.2    Total Bilirubin 0.6 0.0 - 1.0 mg/dL    Alkaline Phosphatase 82 40 - 129 U/L    ALT 60 (H) 10 - 40 U/L    AST 44 (H) 15 - 37 U/L    Globulin 3.3 g/dL   Troponin   Result Value Ref Range    Troponin <0.01 <0.01 ng/mL   EKG 12 Lead   Result Value Ref Range    Ventricular Rate 79 BPM    Atrial Rate 79 BPM    P-R Interval 150 ms    QRS Duration 84 ms    Q-T Interval 396 ms    QTc Calculation (Bazett) 454 ms    P Axis 43 degrees    R Axis 53 degrees    T Axis 51 degrees    Diagnosis       Normal sinus rhythmNormal ECGWhen compared with ECG of 18-JUL-2019 12:07,No significant change was found       ECG  The Ekg interpreted by me shows  normal sinus rhythm with a rate of 79  Axis is   Normal  QTc is  within an acceptable range  Intervals and Durations are unremarkable. ST Segments: normal  No significant change from prior EKG dated 7/18/2019    RADIOLOGY  XR CHEST PORTABLE   Final Result   Questionable early infiltrate or atelectasis left lower lobe. Minimal central pulmonary congestion. ED COURSE/MDM  Patient seen and evaluated. Old records reviewed. Labs and imaging reviewed and results discussed with patient. Patient is a 82-year-old male, with history of rheumatoid arthritis currently on immunosuppressive drugs, presenting with concerns for possible Covid exposure, cough, and fevers. Full HPI as detailed above. Upon arrival to ED, vitals reassuring. Patient is resting comfortably is in no acute distress. Lungs are clear to auscultation. Heart regular rate and rhythm. Labs were performed and reassuring. No leukocytosis or significant anemia. Troponin is negative. Chest x-ray with questionable early infiltrate versus atelectasis, normal central pulmonary congestion.   Patient was ambulated here in the department, continued oxygen saturations greater than 82% on room air. Will be started on antibiotics for presumed pneumonia, will also be tested for Covid and was advised to treat himself as though he is positive for Covid until we get test results since isolate himself as much as possible. Patient was offered a home pulse oximeter but stated that he already had one at home that he can use. He was advised to check his oxygen saturation if feeling significantly short of breath or twice a day. He was advised to return to the ED if oxygen saturation less than 90%. He is given return precautions for the ED. Patient is comfortable agreed with plan of care and was discharged home. During the patient's ED course, the patient was given:  Medications   doxycycline hyclate (VIBRA-TABS) tablet 100 mg (100 mg Oral Given 8/22/21 0010)        CLINICAL IMPRESSION  1. Pneumonia of left lower lobe due to infectious organism    2. Suspected COVID-19 virus infection        Blood pressure 138/86, pulse 81, temperature 99.2 °F (37.3 °C), temperature source Oral, resp. rate 17, height 5' 10\" (1.778 m), weight 200 lb (90.7 kg), SpO2 93 %. China Nance was discharged to home in good condition. Patient was given scripts for the following medications. I counseled patient how to take these medications. Discharge Medication List as of 8/22/2021 12:07 AM      START taking these medications    Details   doxycycline hyclate (VIBRA-TABS) 100 MG tablet Take 1 tablet by mouth 2 times daily for 5 days, Disp-10 tablet, R-0Normal      benzonatate (TESSALON) 100 MG capsule Take 1 capsule by mouth 3 times daily as needed for Cough, Disp-30 capsule, R-0Normal             Follow-up with:  Jahaira Castaneda, APRN - CNP  [address] [place]  [phone]    Schedule an appointment as soon as possible for a visit   As needed    [id number]  [facility] Emergency Department  [address]  Go to   If symptoms worsen      DISCLAIMER: This chart was created using Dragon dictation software. Efforts were made by me to ensure accuracy, however some errors may be present due to limitations of this technology and occasionally words are not transcribed correctly.        Liza Thompson MD  08/22/21 0935

## 2021-08-25 ENCOUNTER — TELEPHONE (OUTPATIENT)
Dept: FAMILY MEDICINE CLINIC | Age: 66
End: 2021-08-25

## 2021-08-25 RX ORDER — LEVOFLOXACIN 750 MG/1
750 TABLET ORAL DAILY
Qty: 5 TABLET | Refills: 0 | Status: SHIPPED | OUTPATIENT
Start: 2021-08-25 | End: 2021-08-30

## 2021-08-25 RX ORDER — BENZONATATE 200 MG/1
200 CAPSULE ORAL 3 TIMES DAILY PRN
Qty: 30 CAPSULE | Refills: 0 | Status: SHIPPED | OUTPATIENT
Start: 2021-08-25 | End: 2021-09-01

## 2021-08-25 NOTE — TELEPHONE ENCOUNTER
Patient's wife called and he was seen in the ER on Sat. 8-21-21. He had a cough,fever and extreme fatigue. They did a Covid and flu test and both were negative. He was given doxycycline and tessalon perles. Today he is still coughing, fatigue and weak fever around 99 to 100. His CXR showed possible pneumonia. He would like to have a VV.

## 2021-08-25 NOTE — TELEPHONE ENCOUNTER
For the patients cough the Tessalon 100mg does not seem to be working. Could it be increased to 200mg tid or could he have something else?

## 2021-08-25 NOTE — TELEPHONE ENCOUNTER
If his symptoms had just started it may have been too soon for COVID test to be positive? ?? Can changed antibiotic to levaquin 750 mg by mouth daily X 5 days.

## 2021-09-21 ENCOUNTER — TELEPHONE (OUTPATIENT)
Dept: FAMILY MEDICINE CLINIC | Age: 66
End: 2021-09-21

## 2021-09-21 DIAGNOSIS — Z20.822 SUSPECTED COVID-19 VIRUS INFECTION: Primary | ICD-10-CM

## 2021-09-21 LAB
INTERNAL QC: NORMAL
SARS-COV-2, NAA: POSITIVE

## 2021-09-21 NOTE — TELEPHONE ENCOUNTER
Patient's wife called and she is wanting the  to get a covid  Test. Faxed order to [facility]

## 2021-11-30 RX ORDER — METOPROLOL SUCCINATE 25 MG/1
25 TABLET, EXTENDED RELEASE ORAL DAILY
Qty: 90 TABLET | Refills: 1 | Status: SHIPPED | OUTPATIENT
Start: 2021-11-30 | End: 2022-06-30

## 2021-11-30 NOTE — TELEPHONE ENCOUNTER
Future appt scheduled 12/06/2021               Last appt 06/01/2021        Last Written 06/08/2021    metoprolol succinate (TOPROL XL) 25 MG extended release tablet  #90  1 RF

## 2021-12-06 ENCOUNTER — OFFICE VISIT (OUTPATIENT)
Dept: FAMILY MEDICINE CLINIC | Age: 66
End: 2021-12-06
Payer: MEDICARE

## 2021-12-06 VITALS
WEIGHT: 190.2 LBS | DIASTOLIC BLOOD PRESSURE: 89 MMHG | OXYGEN SATURATION: 93 % | TEMPERATURE: 98.6 F | SYSTOLIC BLOOD PRESSURE: 142 MMHG | HEART RATE: 82 BPM | BODY MASS INDEX: 27.29 KG/M2

## 2021-12-06 DIAGNOSIS — F33.42 RECURRENT MAJOR DEPRESSIVE DISORDER, IN FULL REMISSION (HCC): ICD-10-CM

## 2021-12-06 DIAGNOSIS — I10 PRIMARY HYPERTENSION: Primary | ICD-10-CM

## 2021-12-06 DIAGNOSIS — Z23 NEED FOR IMMUNIZATION AGAINST INFLUENZA: ICD-10-CM

## 2021-12-06 DIAGNOSIS — E78.2 MIXED HYPERLIPIDEMIA: ICD-10-CM

## 2021-12-06 DIAGNOSIS — I10 ESSENTIAL HYPERTENSION: ICD-10-CM

## 2021-12-06 DIAGNOSIS — K70.9 CHRONIC LIVER DISEASE DUE TO ALCOHOL (HCC): ICD-10-CM

## 2021-12-06 DIAGNOSIS — M06.9 RHEUMATOID ARTHRITIS INVOLVING MULTIPLE SITES, UNSPECIFIED WHETHER RHEUMATOID FACTOR PRESENT (HCC): ICD-10-CM

## 2021-12-06 LAB
A/G RATIO: 1.7 (ref 1.1–2.2)
ALBUMIN SERPL-MCNC: 4.6 G/DL (ref 3.4–5)
ALP BLD-CCNC: 72 U/L (ref 40–129)
ALT SERPL-CCNC: 108 U/L (ref 10–40)
ANION GAP SERPL CALCULATED.3IONS-SCNC: 13 MMOL/L (ref 3–16)
AST SERPL-CCNC: 90 U/L (ref 15–37)
BASOPHILS ABSOLUTE: 0 K/UL (ref 0–0.2)
BASOPHILS RELATIVE PERCENT: 0.5 %
BILIRUB SERPL-MCNC: 0.5 MG/DL (ref 0–1)
BUN BLDV-MCNC: 13 MG/DL (ref 7–20)
CALCIUM SERPL-MCNC: 9.5 MG/DL (ref 8.3–10.6)
CHLORIDE BLD-SCNC: 99 MMOL/L (ref 99–110)
CHOLESTEROL, TOTAL: 222 MG/DL (ref 0–199)
CO2: 23 MMOL/L (ref 21–32)
CREAT SERPL-MCNC: 0.8 MG/DL (ref 0.8–1.3)
EOSINOPHILS ABSOLUTE: 0 K/UL (ref 0–0.6)
EOSINOPHILS RELATIVE PERCENT: 0.6 %
GFR AFRICAN AMERICAN: >60
GFR NON-AFRICAN AMERICAN: >60
GLUCOSE BLD-MCNC: 97 MG/DL (ref 70–99)
HCT VFR BLD CALC: 40.6 % (ref 40.5–52.5)
HDLC SERPL-MCNC: 71 MG/DL (ref 40–60)
HEMOGLOBIN: 13.6 G/DL (ref 13.5–17.5)
LDL CHOLESTEROL CALCULATED: 140 MG/DL
LYMPHOCYTES ABSOLUTE: 0.5 K/UL (ref 1–5.1)
LYMPHOCYTES RELATIVE PERCENT: 13.5 %
MCH RBC QN AUTO: 34.9 PG (ref 26–34)
MCHC RBC AUTO-ENTMCNC: 33.4 G/DL (ref 31–36)
MCV RBC AUTO: 104.4 FL (ref 80–100)
MONOCYTES ABSOLUTE: 0.5 K/UL (ref 0–1.3)
MONOCYTES RELATIVE PERCENT: 11.5 %
NEUTROPHILS ABSOLUTE: 3 K/UL (ref 1.7–7.7)
NEUTROPHILS RELATIVE PERCENT: 73.9 %
PDW BLD-RTO: 14 % (ref 12.4–15.4)
PLATELET # BLD: 193 K/UL (ref 135–450)
PMV BLD AUTO: 8.1 FL (ref 5–10.5)
POTASSIUM SERPL-SCNC: 4.5 MMOL/L (ref 3.5–5.1)
RBC # BLD: 3.89 M/UL (ref 4.2–5.9)
SODIUM BLD-SCNC: 135 MMOL/L (ref 136–145)
TOTAL PROTEIN: 7.3 G/DL (ref 6.4–8.2)
TRIGL SERPL-MCNC: 53 MG/DL (ref 0–150)
VLDLC SERPL CALC-MCNC: 11 MG/DL
WBC # BLD: 4 K/UL (ref 4–11)

## 2021-12-06 PROCEDURE — 36415 COLL VENOUS BLD VENIPUNCTURE: CPT | Performed by: NURSE PRACTITIONER

## 2021-12-06 PROCEDURE — 99214 OFFICE O/P EST MOD 30 MIN: CPT | Performed by: NURSE PRACTITIONER

## 2021-12-06 PROCEDURE — G0008 ADMIN INFLUENZA VIRUS VAC: HCPCS | Performed by: NURSE PRACTITIONER

## 2021-12-06 PROCEDURE — 90674 CCIIV4 VAC NO PRSV 0.5 ML IM: CPT | Performed by: NURSE PRACTITIONER

## 2021-12-06 RX ORDER — POTASSIUM CHLORIDE 750 MG/1
10 TABLET, FILM COATED, EXTENDED RELEASE ORAL DAILY
Qty: 90 TABLET | Refills: 3 | Status: SHIPPED | OUTPATIENT
Start: 2021-12-06

## 2021-12-06 RX ORDER — FLUOXETINE HYDROCHLORIDE 40 MG/1
CAPSULE ORAL
Qty: 180 CAPSULE | Refills: 3 | Status: SHIPPED | OUTPATIENT
Start: 2021-12-06

## 2021-12-06 RX ORDER — AMLODIPINE BESYLATE 5 MG/1
5 TABLET ORAL DAILY
Qty: 90 TABLET | Refills: 3 | Status: SHIPPED | OUTPATIENT
Start: 2021-12-06

## 2021-12-06 RX ORDER — LISINOPRIL 30 MG/1
TABLET ORAL
Qty: 90 TABLET | Refills: 3 | Status: SHIPPED | OUTPATIENT
Start: 2021-12-06

## 2021-12-06 RX ORDER — TERAZOSIN 5 MG/1
CAPSULE ORAL
Qty: 90 CAPSULE | Refills: 3 | Status: SHIPPED | OUTPATIENT
Start: 2021-12-06

## 2021-12-06 ASSESSMENT — ENCOUNTER SYMPTOMS
EYES NEGATIVE: 1
CHEST TIGHTNESS: 0
ABDOMINAL PAIN: 0
ALLERGIC/IMMUNOLOGIC NEGATIVE: 1
SHORTNESS OF BREATH: 0
RESPIRATORY NEGATIVE: 1

## 2021-12-06 NOTE — PROGRESS NOTES
Subjective:      Patient ID: Cheryl Pugh is a 61 y.o. male. Chief Complaint   Patient presents with    Check-Up    Hypertension    Hyperlipidemia        HPI     Patient presents to f/u on chronic conditions. Patient reports overall he is doing well denies any acute problems or concerns. Hypertension:  Home blood pressure monitoring: Yes - wife monitors his blood pressure and his blood pressure had been well controlled. He is not adherent to a low sodium diet. Patient denies chest pain, shortness of breath, headache, lightheadedness, blurred vision, peripheral edema, palpitations, dry cough and fatigue. Antihypertensive medication side effects: no medication side effects noted. Use of agents associated with hypertension: none. Sodium (mmol/L)   Date Value   08/21/2021 135 (L)    BUN (mg/dL)   Date Value   08/21/2021 14    Glucose (mg/dL)   Date Value   08/21/2021 104 (H)      Potassium reflex Magnesium (mmol/L)   Date Value   08/21/2021 3.9    CREATININE (mg/dL)   Date Value   08/21/2021 0.7 (L)         Hyperlipidemia:  Patient is not following a low fat, low cholesterol diet. He is not exercising regularly. Lab Results   Component Value Date    CHOL 169 07/14/2020    TRIG 186 (H) 07/14/2020    HDL 47 07/14/2020    LDLCALC 85 07/14/2020    LDLDIRECT 99 06/20/2019     Lab Results   Component Value Date    ALT 60 (H) 08/21/2021    AST 44 (H) 08/21/2021        Review of Systems   Constitutional: Negative. Negative for activity change, appetite change, chills, fatigue and unexpected weight change. HENT: Negative. Eyes: Negative. Respiratory: Negative. Negative for chest tightness and shortness of breath. Cardiovascular: Negative. Negative for chest pain, palpitations and leg swelling. Gastrointestinal: Negative for abdominal pain. Endocrine: Negative. Genitourinary: Negative. Musculoskeletal: Positive for arthralgias (chronic).    Skin: Negative. Negative for rash and wound. Allergic/Immunologic: Negative. Neurological: Negative. Negative for dizziness, light-headedness and headaches. Hematological: Negative. Psychiatric/Behavioral: Negative. Negative for dysphoric mood. Patient Active Problem List   Diagnosis    Muscular weakness    Depression    Acid reflux    Hyperlipidemia    Hypertension    Muscle pain    Hearing decreased    Arthritis    Enlarged prostate    Chronic fatigue    Dizziness    Palpitations    Rheumatoid arthritis ([facility]    History of tobacco abuse    Hx of colonic polyps    Fatty liver    Chronic liver disease due to alcohol ([facility]       No outpatient medications have been marked as taking for the 21 encounter (Appointment) with SATISH Webb CNP. Allergies   Allergen Reactions    Codeine      Not sure of reaction    Pcn [Penicillins]      Not sure of reaction       Social History     Tobacco Use    Smoking status: Former Smoker     Packs/day: 2.00     Years: 15.00     Pack years: 30.00     Quit date: 1993     Years since quittin.3    Smokeless tobacco: Current User     Types: Snuff    Tobacco comment: chews daily   Substance Use Topics    Alcohol use: Yes     Alcohol/week: 30.0 - 35.0 standard drinks     Types: 30 - 35 Cans of beer per week     Comment: PER WIFE       Objective:   BP (!) 145/90   Pulse 82   Temp 98.6 °F (37 °C) (Oral)   Wt 190 lb 3.2 oz (86.3 kg)   SpO2 93%   BMI 27.29 kg/m²     Vitals:    21 0851 21 0925   BP: (!) 145/90 (!) 142/89   Pulse: 82    Temp: 98.6 °F (37 °C)    TempSrc: Oral    SpO2: 93%    Weight: 190 lb 3.2 oz (86.3 kg)      Physical Exam  Vitals and nursing note reviewed. Constitutional:       General: He is not in acute distress. Appearance: Normal appearance. He is well-developed and well-groomed. He is not ill-appearing or toxic-appearing. HENT:      Head: Normocephalic and atraumatic.    Eyes: Extraocular Movements: Extraocular movements intact. Conjunctiva/sclera: Conjunctivae normal.   Cardiovascular:      Rate and Rhythm: Normal rate and regular rhythm. Pulses: Normal pulses. Heart sounds: Normal heart sounds, S1 normal and S2 normal.   Pulmonary:      Effort: Pulmonary effort is normal. No accessory muscle usage or respiratory distress. Breath sounds: Normal breath sounds. Abdominal:      General: Bowel sounds are normal.      Palpations: Abdomen is soft. Musculoskeletal:      Cervical back: Neck supple. Right lower leg: No edema. Left lower leg: No edema. Lymphadenopathy:      Cervical: No cervical adenopathy. Skin:     General: Skin is warm and moist.      Capillary Refill: Capillary refill takes less than 2 seconds. Findings: No rash. Neurological:      General: No focal deficit present. Mental Status: He is alert and oriented to person, place, and time. Mental status is at baseline. Psychiatric:         Attention and Perception: Attention normal.         Mood and Affect: Mood normal.         Speech: Speech normal.         Behavior: Behavior normal. Behavior is cooperative. Assessment/Plan:   1. Primary hypertension  Patient presents today to follow-up on chronic conditions. Patient's blood pressure is slightly elevated this a.m. however he has been monitoring his blood pressure at home and reports it has been well controlled. Patient continues to take amlodipine, lisinopril and metoprolol daily as ordered. Patient reports he just had his morning medications approximately half hour ago. Order for labs as below. Recommend patient continue with current treatment and to monitor blood pressure routinely.   Advised to follow-up if blood pressures consistently greater than 138/88  - CBC Auto Differential  - Comprehensive Metabolic Panel  - lisinopril (PRINIVIL;ZESTRIL) 30 MG tablet; TAKE 1 TABLET BY MOUTH EVERY DAY  Dispense: 90 tablet; Refill: 3  - potassium chloride (KLOR-CON 10) 10 MEQ extended release tablet; Take 1 tablet by mouth daily  Dispense: 90 tablet; Refill: 3    2. Mixed hyperlipidemia  Patient is not currently taking any cholesterol medications due to chronic liver disease. Repeat lipids today. I will make further recommendations based on lab results. - Lipid Panel    3. 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    4. Rheumatoid arthritis involving multiple sites, unspecified whether rheumatoid factor present ([facility]  Stable. Following with rheumatology. 5. Recurrent major depressive disorder, in full remission ([facility]  Depression is well controlled with current treatment. Patient is tolerating fluoxetine as ordered. Recommend patient continue with current treatment.  - FLUoxetine (PROZAC) 40 MG capsule; 2 capsules daily  Dispense: 180 capsule; Refill: 3    6.  Need for immunization against influenza  - INFLUENZA, MDCK QUADV, 2 YRS AND OLDER, IM, PF, PREFILL SYR OR SDV, 0.5ML (FLUCELVAX QUADV, PF)

## 2021-12-06 NOTE — PATIENT INSTRUCTIONS
of fruit, ½ cup chopped or canned fruit, 1/4 cup dried fruit, or 4 ounces (½ cup) of fruit juice. Choose fruit more often than fruit juice. · Eat 4 to 5 servings of vegetables each day. A serving is 1 cup of lettuce or raw leafy vegetables, ½ cup of chopped or cooked vegetables, or 4 ounces (½ cup) of vegetable juice. Choose vegetables more often than vegetable juice. · Get 2 to 3 servings of low-fat and fat-free dairy each day. A serving is 8 ounces of milk, 1 cup of yogurt, or 1 ½ ounces of cheese. · Eat 6 to 8 servings of grains each day. A serving is 1 slice of bread, 1 ounce of dry cereal, or ½ cup of cooked rice, pasta, or cooked cereal. Try to choose whole-grain products as much as possible. · Limit lean meat, poultry, and fish to 2 servings each day. A serving is 3 ounces, about the size of a deck of cards. · Eat 4 to 5 servings of nuts, seeds, and legumes (cooked dried beans, lentils, and split peas) each week. A serving is 1/3 cup of nuts, 2 tablespoons of seeds, or ½ cup of cooked beans or peas. · Limit fats and oils to 2 to 3 servings each day. A serving is 1 teaspoon of vegetable oil or 2 tablespoons of salad dressing. · Limit sweets and added sugars to 5 servings or less a week. A serving is 1 tablespoon jelly or jam, ½ cup sorbet, or 1 cup of lemonade. · Eat less than 2,300 milligrams (mg) of sodium a day. If you limit your sodium to 1,500 mg a day, you can lower your blood pressure even more. · Be aware that all of these are the suggested number of servings for people who eat 1,800 to 2,000 calories a day. Your recommended number of servings may be different if you need more or fewer calories. Tips for success  · Start small. Do not try to make dramatic changes to your diet all at once. You might feel that you are missing out on your favorite foods and then be more likely to not follow the plan. Make small changes, and stick with them.  Once those changes become habit, add a few more changes. · Try some of the following:  ? Make it a goal to eat a fruit or vegetable at every meal and at snacks. This will make it easy to get the recommended amount of fruits and vegetables each day. ? Try yogurt topped with fruit and nuts for a snack or healthy dessert. ? Add lettuce, tomato, cucumber, and onion to sandwiches. ? Combine a ready-made pizza crust with low-fat mozzarella cheese and lots of vegetable toppings. Try using tomatoes, squash, spinach, broccoli, carrots, cauliflower, and onions. ? Have a variety of cut-up vegetables with a low-fat dip as an appetizer instead of chips and dip. ? Sprinkle sunflower seeds or chopped almonds over salads. Or try adding chopped walnuts or almonds to cooked vegetables. ? Try some vegetarian meals using beans and peas. Add garbanzo or kidney beans to salads. Make burritos and tacos with mashed pereira beans or black beans. Where can you learn more? Go to https://SafetyTatpev2tel[employer] [place]. org and sign in to your [employer] account. Enter L935 in the [facility] box to learn more about \"DASH Diet: Care Instructions. \"     If you do not have an account, please click on the \"Sign Up Now\" link. Current as of: April 29, 2021               Content Version: 13.0  © 9488-5116 [employer], [facility]. Care instructions adapted under license by [facility] ([facility]). If you have questions about a medical condition or this instruction, always ask your healthcare professional. [place] [address] any warranty or liability for your use of this information.

## 2022-02-14 ENCOUNTER — VIRTUAL VISIT (OUTPATIENT)
Dept: FAMILY MEDICINE CLINIC | Age: 67
End: 2022-02-14
Payer: MEDICARE

## 2022-02-14 DIAGNOSIS — J40 BRONCHITIS: ICD-10-CM

## 2022-02-14 DIAGNOSIS — J01.90 ACUTE BACTERIAL SINUSITIS: Primary | ICD-10-CM

## 2022-02-14 DIAGNOSIS — B96.89 ACUTE BACTERIAL SINUSITIS: Primary | ICD-10-CM

## 2022-02-14 PROCEDURE — 99213 OFFICE O/P EST LOW 20 MIN: CPT | Performed by: NURSE PRACTITIONER

## 2022-02-14 RX ORDER — DOXYCYCLINE HYCLATE 100 MG
100 TABLET ORAL 2 TIMES DAILY
Qty: 20 TABLET | Refills: 0 | Status: SHIPPED | OUTPATIENT
Start: 2022-02-14 | End: 2022-02-24

## 2022-02-14 RX ORDER — BENZONATATE 200 MG/1
200 CAPSULE ORAL 3 TIMES DAILY PRN
Qty: 30 CAPSULE | Refills: 0 | Status: SHIPPED | OUTPATIENT
Start: 2022-02-14 | End: 2022-02-21

## 2022-02-14 ASSESSMENT — ENCOUNTER SYMPTOMS
GASTROINTESTINAL NEGATIVE: 1
SINUS PRESSURE: 1
COUGH: 1
EYES NEGATIVE: 1
SINUS PAIN: 1

## 2022-02-14 NOTE — PROGRESS NOTES
2022    TELEHEALTH EVALUATION -- Audio/Visual (During JDMJS-61 public health emergency)    HPI:    Latricia Parisi (:  1955) has requested an audio/video evaluation for the following concern(s):    Sinus Pain  Patient complains of productive cough of clear phlegm, green nasal congestion, post nasal drip, headache, and sinus pressure. Onset of symptoms was 1 week ago. Symptoms have been gradually worsening since that time. He is drinking plenty of fluids. Past history is significant for RA. Patient is former smoker, quit 30+ years ago. Review of Systems   Constitutional: Positive for fever. HENT: Positive for congestion, sinus pressure and sinus pain. Eyes: Negative. Respiratory: Positive for cough. Cardiovascular: Negative. Gastrointestinal: Negative. Endocrine: Negative. Genitourinary: Negative. Musculoskeletal: Negative for myalgias. Neurological: Positive for headaches. Prior to Visit Medications    Medication Sig Taking?  Authorizing Provider   terazosin (HYTRIN) 5 MG capsule daily  SATISH Wilson CNP   FLUoxetine (PROZAC) 40 MG capsule 2 capsules daily  SATISH Wilson CNP   amLODIPine (NORVASC) 5 MG tablet Take 1 tablet by mouth daily  SATISH Wilson CNP   lisinopril (PRINIVIL;ZESTRIL) 30 MG tablet TAKE 1 TABLET BY MOUTH EVERY DAY  SATISH Powell CNP   potassium chloride (KLOR-CON 10) 10 MEQ extended release tablet Take 1 tablet by mouth daily  SATISH Wilson CNP   metoprolol succinate (TOPROL XL) 25 MG extended release tablet TAKE 1 TABLET BY MOUTH DAILY  SATISH Wilson CNP   vitamin B-12 (CYANOCOBALAMIN) 500 MCG tablet Take 500 mcg by mouth daily  Historical Provider, MD   polycarbophil (FIBERCON) 625 MG tablet Take 625 mg by mouth daily   Historical Provider, MD   tamsulosin (FLOMAX) 0.4 MG capsule Take 0.4 mg by mouth daily  Historical Provider, MD   Tofacitinib Citrate (XELJANZ XR) 11 MG TB24 Take 11 mg by mouth daily  Historical Provider, MD   finasteride (PROSCAR) 5 MG tablet Take 5 mg by mouth daily  Historical Provider, MD   sulindac (CLINORIL) 150 MG tablet TAKE 1 TABLET BY MOUTH 2 TIMES DAILY. Historical Provider, MD   aspirin 81 MG chewable tablet Take 81 mg by mouth daily. Historical Provider, MD   omeprazole (PRILOSEC) 20 MG capsule Take 20 mg by mouth Daily   Historical Provider, MD       Social History     Tobacco Use    Smoking status: Former Smoker     Packs/day: 2.00     Years: 15.00     Pack years: 30.00     Quit date: 1993     Years since quittin.5    Smokeless tobacco: Current User     Types: Snuff    Tobacco comment: chews daily   Vaping Use    Vaping Use: Never used   Substance Use Topics    Alcohol use: Yes     Alcohol/week: 30.0 - 35.0 standard drinks     Types: 30 - 35 Cans of beer per week     Comment: PER WIFE    Drug use: Yes     Types: Marijuana (Weed)     Comment: couple times per wk         Allergies   Allergen Reactions    Codeine      Not sure of reaction    Pcn [Penicillins]      Not sure of reaction   ,   Past Medical History:   Diagnosis Date    Acid reflux     Arthritis     rheumatoid    Depression     Enlarged prostate     Hearing decreased     does not wear his hearing aid    Hyperlipidemia     no meds    Hypertension     Liver disease     Stage 2 per pt     Muscle pain     Rheumatoid arthritis ([address]    ,   Past Surgical History:   Procedure Laterality Date    APPENDECTOMY      CARDIAC CATHETERIZATION      COLONOSCOPY  2013    polyps    COLONOSCOPY  2021    COLONOSCOPY N/A 2021    COLON W/ANES.  (9:00) **FIBROSCAN TOO** performed by Wally Bobby MD at [address] OTHER SURGICAL HISTORY  2012    muscle biopsy left shoulder    UPPER GASTROINTESTINAL ENDOSCOPY  2013    dysphagia    WRIST SURGERY      left   ,   Social History     Tobacco Use    Smoking status: Former Smoker     Packs/day: 2.00     Years: 15.00     Pack years: 30.00     Quit date: 1993     Years since quittin.5    Smokeless tobacco: Current User     Types: Snuff    Tobacco comment: chews daily   Vaping Use    Vaping Use: Never used   Substance Use Topics    Alcohol use: Yes     Alcohol/week: 30.0 - 35.0 standard drinks     Types: 30 - 35 Cans of beer per week     Comment: PER WIFE    Drug use: Yes     Types: Marijuana (Weed)     Comment: couple times per wk   ,   Family History   Problem Relation Age of Onset    Cancer Mother         lung    Stroke Mother         at age 24   [de-identified] Diabetes Mother     Heart Disease Father     High Blood Pressure Father        PHYSICAL EXAMINATION:  [ INSTRUCTIONS:  \"[x]\" Indicates a positive item  \"[]\" Indicates a negative item  -- DELETE ALL ITEMS NOT EXAMINED]  Vital Signs: (As obtained by patient/caregiver or practitioner observation)      Temperature- 99.0 Pulse ox 92-96 %    Constitutional: [x] Appears well-developed and well-nourished [x] No apparent distress      [] Abnormal-   Mental status  [x] Alert and awake  [x] Oriented to person/place/time [x]Able to follow commands      Eyes:  EOM    [x]  Normal  [] Abnormal-  Sclera  [x]  Normal  [] Abnormal -         Discharge [x]  None visible  [] Abnormal -    HENT:   [x] Normocephalic, atraumatic.   [] Abnormal   [x] Mouth/Throat: Mucous membranes are moist.     External Ears [x] Normal  [] Abnormal-     Neck: [x] No visualized mass     Pulmonary/Chest: [x] Respiratory effort normal.  [x] No visualized signs of difficulty breathing or respiratory distress        [] Abnormal-      Musculoskeletal:   [] Normal gait with no signs of ataxia         [x] Normal range of motion of neck        [] Abnormal-       Neurological:        [] No Facial Asymmetry (Cranial nerve 7 motor function) (limited exam to video visit)          [x] No gaze palsy        [] Abnormal-         Skin:        [x] No significant exanthematous lesions or discoloration noted on facial skin         [] Abnormal-            Psychiatric:       [x] Normal Affect [x] No Hallucinations        [] Abnormal-     Other pertinent observable physical exam findings-     ASSESSMENT/PLAN:  1. Acute bacterial sinusitis  Patient presents today with complaints of headache, sinus pain and pressure, postnasal drip, green nasal drainage, productive cough of clear phlegm and low-grade temp of 99. Patient reports he was negative for COVID-19. Symptoms have been present for over a week with gradual worsening of symptoms. Patient does have an active suppressed immune system related to treatment for RA. Recommend medication as below. Advised to drink plenty of fluids to stay well-hydrated and to follow-up if no better worsening of symptoms. Patient verbalized understanding agreeable to plan. - doxycycline hyclate (VIBRA-TABS) 100 MG tablet; Take 1 tablet by mouth 2 times daily for 10 days  Dispense: 20 tablet; Refill: 0    2. Bronchitis  See #1.  - benzonatate (TESSALON) 200 MG capsule; Take 1 capsule by mouth 3 times daily as needed for Cough  Dispense: 30 capsule; Refill: 0      No follow-ups on file. Manish Browne, was evaluated through a synchronous (real-time) audio-video encounter. The patient (or guardian if applicable) is aware that this is a billable service, which includes applicable co-pays. This Virtual Visit was conducted with patient's (and/or legal guardian's) consent. The visit was conducted pursuant to the emergency declaration under the [address] [place] [address] authority and the [employer] General Act. Patient identification was verified, and a caregiver was present when appropriate. The patient was located at home in a state where the provider was licensed to provide care.       Total time spent on this encounter: Not billed by time    --Latonia Garrido APRN - CNP on 2/14/2022 at 11:47 AM    An electronic signature was used to authenticate this note.

## 2022-02-14 NOTE — PATIENT INSTRUCTIONS
Please read the healthy family handout that you were given and share it with your family. Please compare this printed medication list with your medications at home to be sure they are the same. If you have any medications that are different please contact us immediately at [phone]. Also review your allergies that we have listed, these may also include medications that you have not been able to tolerate, make sure everything listed is correct. If you have any allergies that are different please contact us immediately at [phone].

## 2022-04-05 ENCOUNTER — TELEMEDICINE (OUTPATIENT)
Dept: FAMILY MEDICINE CLINIC | Age: 67
End: 2022-04-05
Payer: MEDICARE

## 2022-04-05 DIAGNOSIS — H91.93 BILATERAL HEARING LOSS, UNSPECIFIED HEARING LOSS TYPE: ICD-10-CM

## 2022-04-05 DIAGNOSIS — Z00.00 INITIAL MEDICARE ANNUAL WELLNESS VISIT: Primary | ICD-10-CM

## 2022-04-05 PROCEDURE — G0438 PPPS, INITIAL VISIT: HCPCS | Performed by: NURSE PRACTITIONER

## 2022-04-05 ASSESSMENT — PATIENT HEALTH QUESTIONNAIRE - PHQ9
7. TROUBLE CONCENTRATING ON THINGS, SUCH AS READING THE NEWSPAPER OR WATCHING TELEVISION: 0
2. FEELING DOWN, DEPRESSED OR HOPELESS: 0
SUM OF ALL RESPONSES TO PHQ QUESTIONS 1-9: 0
10. IF YOU CHECKED OFF ANY PROBLEMS, HOW DIFFICULT HAVE THESE PROBLEMS MADE IT FOR YOU TO DO YOUR WORK, TAKE CARE OF THINGS AT HOME, OR GET ALONG WITH OTHER PEOPLE: 0
SUM OF ALL RESPONSES TO PHQ9 QUESTIONS 1 & 2: 0
9. THOUGHTS THAT YOU WOULD BE BETTER OFF DEAD, OR OF HURTING YOURSELF: 0
4. FEELING TIRED OR HAVING LITTLE ENERGY: 0
3. TROUBLE FALLING OR STAYING ASLEEP: 0
SUM OF ALL RESPONSES TO PHQ QUESTIONS 1-9: 0
6. FEELING BAD ABOUT YOURSELF - OR THAT YOU ARE A FAILURE OR HAVE LET YOURSELF OR YOUR FAMILY DOWN: 0
SUM OF ALL RESPONSES TO PHQ QUESTIONS 1-9: 0
5. POOR APPETITE OR OVEREATING: 0
1. LITTLE INTEREST OR PLEASURE IN DOING THINGS: 0
8. MOVING OR SPEAKING SO SLOWLY THAT OTHER PEOPLE COULD HAVE NOTICED. OR THE OPPOSITE, BEING SO FIGETY OR RESTLESS THAT YOU HAVE BEEN MOVING AROUND A LOT MORE THAN USUAL: 0
SUM OF ALL RESPONSES TO PHQ QUESTIONS 1-9: 0

## 2022-04-05 ASSESSMENT — LIFESTYLE VARIABLES
HOW OFTEN DURING THE LAST YEAR HAVE YOU FAILED TO DO WHAT WAS NORMALLY EXPECTED FROM YOU BECAUSE OF DRINKING: 0
HOW OFTEN DO YOU HAVE A DRINK CONTAINING ALCOHOL: 4 OR MORE TIMES A WEEK
HOW OFTEN DURING THE LAST YEAR HAVE YOU BEEN UNABLE TO REMEMBER WHAT HAPPENED THE NIGHT BEFORE BECAUSE YOU HAD BEEN DRINKING: 0
HOW OFTEN DURING THE LAST YEAR HAVE YOU HAD A FEELING OF GUILT OR REMORSE AFTER DRINKING: 0
HOW OFTEN DURING THE LAST YEAR HAVE YOU FOUND THAT YOU WERE NOT ABLE TO STOP DRINKING ONCE YOU HAD STARTED: 2
HAVE YOU OR SOMEONE ELSE BEEN INJURED AS A RESULT OF YOUR DRINKING: 0
HOW OFTEN DURING THE LAST YEAR HAVE YOU NEEDED AN ALCOHOLIC DRINK FIRST THING IN THE MORNING TO GET YOURSELF GOING AFTER A NIGHT OF HEAVY DRINKING: 0
HAS A RELATIVE, FRIEND, DOCTOR, OR ANOTHER HEALTH PROFESSIONAL EXPRESSED CONCERN ABOUT YOUR DRINKING OR SUGGESTED YOU CUT DOWN: 2
HOW MANY STANDARD DRINKS CONTAINING ALCOHOL DO YOU HAVE ON A TYPICAL DAY: 5 OR 6

## 2022-04-05 NOTE — PATIENT INSTRUCTIONS
Please read the healthy family handout that you were given and share it with your family. Please compare this printed medication list with your medications at home to be sure they are the same. If you have any medications that are different please contact us immediately at [phone]. Also review your allergies that we have listed, these may also include medications that you have not been able to tolerate, make sure everything listed is correct. If you have any allergies that are different please contact us immediately at [phone]. Personalized Preventive Plan for Reed Giang - 4/5/2022  Medicare offers a range of preventive health benefits. Some of the tests and screenings are paid in full while other may be subject to a deductible, co-insurance, and/or copay. Some of these benefits include a comprehensive review of your medical history including lifestyle, illnesses that may run in your family, and various assessments and screenings as appropriate. After reviewing your medical record and screening and assessments performed today your provider may have ordered immunizations, labs, imaging, and/or referrals for you. A list of these orders (if applicable) as well as your Preventive Care list are included within your After Visit Summary for your review. Other Preventive Recommendations:    · A preventive eye exam performed by an eye specialist is recommended every 1-2 years to screen for glaucoma; cataracts, macular degeneration, and other eye disorders. · A preventive dental visit is recommended every 6 months. · Try to get at least 150 minutes of exercise per week or 10,000 steps per day on a pedometer . · Order or download the FREE \"Exercise & Physical Activity: Your Everyday Guide\" from The [employer]. Call 9-682.286.4464 or search [employer] online. · You need 2856-5497 mg of calcium and 4874-1527 IU of vitamin D per day.  It is possible to meet your calcium requirement with diet alone, but a vitamin D supplement is usually necessary to meet this goal.  · When exposed to the sun, use a sunscreen that protects against both UVA and UVB radiation with an SPF of 30 or greater. Reapply every 2 to 3 hours or after sweating, drying off with a towel, or swimming. · Always wear a seat belt when traveling in a car. Always wear a helmet when riding a bicycle or motorcycle.

## 2022-04-05 NOTE — PROGRESS NOTES
Medicare Annual Wellness Visit    Molly Vasquez is here for Medicare AWV    Assessment & Plan   Initial Medicare annual wellness visit  Bilateral hearing loss, unspecified hearing loss type  -     [facility], [facility], [facility]      Recommendations for Preventive Services Due: see orders and patient instructions/AVS.  Recommended screening schedule for the next 5-10 years is provided to the patient in written form: see Patient Instructions/AVS.     Return for Medicare Annual Wellness Visit in 1 year. Subjective     Patient's complete Health Risk Assessment and screening values have been reviewed and are found in Flowsheets. The following problems were reviewed today and where indicated follow up appointments were made and/or referrals ordered. Positive Risk Factor Screenings with Interventions:       Tobacco Use:     Tobacco Use: High Risk    Smoking Tobacco Use: Former Smoker    Smokeless Tobacco Use: Current User     E-Cigarettes/Vaping Use     Questions Responses    E-Cigarette/Vaping Use Never User    Start Date     Passive Exposure     Quit Date     Counseling Given     Comments         Substance Abuse - Tobacco Interventions:  patient smokes cannabis daily for pain management related to RA. Patient reports he does drink alcohol daily 4-6 beers daily. Alcohol Screening:  AUDIT Total Score: 10    A score of 8 or more is associated with harmful or hazardous drinking. A score of 13 or more in women, and 15 or more in men, is likely to indicate alcohol dependence.     Substance Abuse - Alcohol Interventions:  patient is not ready to change his/her alcohol consumption behavior at this time    Drug Use Screening:   DAST-10 Score Interpretation:  1-2: Low level - Monitor, re-assess at a later date; 3-5: Moderate level - Further Investigation; 6-8: Substantial level - Intensive Assessment; 9-10: Severe level - Intensive Assessment    Substance Abuse - Drug Use Interventions:  patient is not ready to change his/her recreational drug use behavior at this time       General Health and ACP:  General  In general, how would you say your health is?: Fair  In the past 7 days, have you experienced any of the following: New or Increased Pain, New or Increased Fatigue, Loneliness, Social Isolation, Stress or Anger?: No  Do you get the social and emotional support that you need?: Yes  Do you have a Living Will?: (!) No    Advance Directives     Power of [facility] Will ACP-Advance Directive ACP-Power of     Not on File Not on File Not on File Not on File      General Health Risk Interventions:  · No Living Will: Patient declines ACP discussion/assistance    Health Habits/Nutrition:     Physical Activity: Sufficiently Active    Days of Exercise per Week: 5 days    Minutes of Exercise per Session: 30 min     Have you lost any weight without trying in the past 3 months?: (!) Yes     Have you seen the dentist within the past year?: (!) No    Health Habits/Nutrition Interventions:  · None    Hearing/Vision:  Do you or your family notice any trouble with your hearing that hasn't been managed with hearing aids?: (!) Yes  Do you have difficulty driving, watching TV, or doing any of your daily activities because of your eyesight?: No  Have you had an eye exam within the past year?: Yes  No exam data present    Hearing/Vision Interventions:  · Hearing concerns:  audiology referral provided            Objective      Patient-Reported Vitals  No data recorded     None       Allergies   Allergen Reactions    Codeine      Not sure of reaction    Pcn [Penicillins]      Not sure of reaction     Prior to Visit Medications    Medication Sig Taking?  Authorizing Provider   terazosin (HYTRIN) 5 MG capsule daily Yes SATISH Bauman CNP   FLUoxetine (PROZAC) 40 MG capsule 2 capsules daily Yes SATISH Wilson - CNP   amLODIPine (NORVASC) 5 MG tablet Take 1 tablet by mouth daily Yes SATISH Ivory CNP   lisinopril (PRINIVIL;ZESTRIL) 30 MG tablet TAKE 1 TABLET BY MOUTH EVERY DAY Yes SATISH Ivory CNP   potassium chloride (KLOR-CON 10) 10 MEQ extended release tablet Take 1 tablet by mouth daily Yes SATISH Ivory CNP   metoprolol succinate (TOPROL XL) 25 MG extended release tablet TAKE 1 TABLET BY MOUTH DAILY Yes SATISH Wilson CNP   vitamin B-12 (CYANOCOBALAMIN) 500 MCG tablet Take 500 mcg by mouth daily Yes Historical Provider, MD   polycarbophil (FIBERCON) 625 MG tablet Take 625 mg by mouth daily  Yes Historical Provider, MD   tamsulosin (FLOMAX) 0.4 MG capsule Take 0.4 mg by mouth daily Yes Historical Provider, MD   Tofacitinib Citrate (XELJANZ XR) 11 MG TB24 Take 11 mg by mouth daily Yes Historical Provider, MD   finasteride (PROSCAR) 5 MG tablet Take 5 mg by mouth daily Yes Historical Provider, MD   sulindac (CLINORIL) 150 MG tablet TAKE 1 TABLET BY MOUTH 2 TIMES DAILY. Yes Historical Provider, MD   aspirin 81 MG chewable tablet Take 81 mg by mouth daily. Yes Historical Provider, MD   omeprazole (PRILOSEC) 20 MG capsule Take 20 mg by mouth Daily  Yes Historical Provider, MD Espinoza (Including outside providers/suppliers regularly involved in providing care):   Patient Care Team:  SATISH Ivory CNP as PCP - General (Family Medicine)  [facility]ISH [facility]  Rohan Sandy MD as Consulting Physician (Cardiology)    Reviewed and updated this visit:  Allergies  Meds  Problems            Duane Saini, was evaluated through an audio encounter. The patient (or guardian if applicable) is aware that this is a billable service, which includes applicable co-pays. This Virtual Visit was conducted with patient's (and/or legal guardian's) consent.  The visit was conducted pursuant to the emergency declaration under the [facility] waiver authority and the [employer] General Act. Patient identification was verified, and a caregiver was present when appropriate. The patient was located at home in a state where the provider was licensed to provide care.

## 2022-06-30 RX ORDER — METOPROLOL SUCCINATE 25 MG/1
25 TABLET, EXTENDED RELEASE ORAL DAILY
Qty: 90 TABLET | Refills: 1 | Status: SHIPPED | OUTPATIENT
Start: 2022-06-30

## 2022-06-30 NOTE — TELEPHONE ENCOUNTER
Future appt scheduled 07/06/2022                  Last appt 12/16/2021      Last Written 11/30/2021    metoprolol succinate (TOPROL XL) 25 MG extended release tablet  #90  1 RF       Refilled medication per verbal order from provider.

## 2022-07-13 ENCOUNTER — OFFICE VISIT (OUTPATIENT)
Dept: FAMILY MEDICINE CLINIC | Age: 67
End: 2022-07-13
Payer: MEDICARE

## 2022-07-13 VITALS — SYSTOLIC BLOOD PRESSURE: 126 MMHG | HEART RATE: 71 BPM | DIASTOLIC BLOOD PRESSURE: 89 MMHG | OXYGEN SATURATION: 92 %

## 2022-07-13 DIAGNOSIS — K76.0 FATTY LIVER: ICD-10-CM

## 2022-07-13 DIAGNOSIS — E78.2 MIXED HYPERLIPIDEMIA: ICD-10-CM

## 2022-07-13 DIAGNOSIS — K70.9 CHRONIC LIVER DISEASE DUE TO ALCOHOL (HCC): ICD-10-CM

## 2022-07-13 DIAGNOSIS — M06.9 RHEUMATOID ARTHRITIS INVOLVING MULTIPLE SITES, UNSPECIFIED WHETHER RHEUMATOID FACTOR PRESENT (HCC): ICD-10-CM

## 2022-07-13 DIAGNOSIS — Z13.6 SCREENING FOR AAA (ABDOMINAL AORTIC ANEURYSM): ICD-10-CM

## 2022-07-13 DIAGNOSIS — Z12.5 PROSTATE CANCER SCREENING: ICD-10-CM

## 2022-07-13 DIAGNOSIS — I10 PRIMARY HYPERTENSION: Primary | ICD-10-CM

## 2022-07-13 LAB
A/G RATIO: 1.7 (ref 1.1–2.2)
ALBUMIN SERPL-MCNC: 4.2 G/DL (ref 3.4–5)
ALP BLD-CCNC: 56 U/L (ref 40–129)
ALT SERPL-CCNC: 67 U/L (ref 10–40)
ANION GAP SERPL CALCULATED.3IONS-SCNC: 16 MMOL/L (ref 3–16)
AST SERPL-CCNC: 72 U/L (ref 15–37)
BASOPHILS ABSOLUTE: 0 K/UL (ref 0–0.2)
BASOPHILS RELATIVE PERCENT: 0.6 %
BILIRUB SERPL-MCNC: 0.4 MG/DL (ref 0–1)
BUN BLDV-MCNC: 11 MG/DL (ref 7–20)
CALCIUM SERPL-MCNC: 9.1 MG/DL (ref 8.3–10.6)
CHLORIDE BLD-SCNC: 102 MMOL/L (ref 99–110)
CO2: 19 MMOL/L (ref 21–32)
CREAT SERPL-MCNC: 0.7 MG/DL (ref 0.8–1.3)
EOSINOPHILS ABSOLUTE: 0.1 K/UL (ref 0–0.6)
EOSINOPHILS RELATIVE PERCENT: 1.5 %
FOLATE: 7.55 NG/ML (ref 4.78–24.2)
GFR AFRICAN AMERICAN: >60
GFR NON-AFRICAN AMERICAN: >60
GLUCOSE BLD-MCNC: 78 MG/DL (ref 70–99)
HCT VFR BLD CALC: 38.4 % (ref 40.5–52.5)
HEMOGLOBIN: 13.1 G/DL (ref 13.5–17.5)
LYMPHOCYTES ABSOLUTE: 0.6 K/UL (ref 1–5.1)
LYMPHOCYTES RELATIVE PERCENT: 13.4 %
MCH RBC QN AUTO: 36 PG (ref 26–34)
MCHC RBC AUTO-ENTMCNC: 34.3 G/DL (ref 31–36)
MCV RBC AUTO: 104.9 FL (ref 80–100)
MONOCYTES ABSOLUTE: 0.4 K/UL (ref 0–1.3)
MONOCYTES RELATIVE PERCENT: 8.5 %
NEUTROPHILS ABSOLUTE: 3.2 K/UL (ref 1.7–7.7)
NEUTROPHILS RELATIVE PERCENT: 76 %
PDW BLD-RTO: 13.8 % (ref 12.4–15.4)
PLATELET # BLD: 205 K/UL (ref 135–450)
PMV BLD AUTO: 7.5 FL (ref 5–10.5)
POTASSIUM SERPL-SCNC: 4.2 MMOL/L (ref 3.5–5.1)
PROSTATE SPECIFIC ANTIGEN: 0.42 NG/ML (ref 0–4)
RBC # BLD: 3.66 M/UL (ref 4.2–5.9)
SODIUM BLD-SCNC: 137 MMOL/L (ref 136–145)
TOTAL PROTEIN: 6.7 G/DL (ref 6.4–8.2)
VITAMIN B-12: 981 PG/ML (ref 211–911)
WBC # BLD: 4.2 K/UL (ref 4–11)

## 2022-07-13 PROCEDURE — 36415 COLL VENOUS BLD VENIPUNCTURE: CPT | Performed by: NURSE PRACTITIONER

## 2022-07-13 PROCEDURE — 1123F ACP DISCUSS/DSCN MKR DOCD: CPT | Performed by: NURSE PRACTITIONER

## 2022-07-13 PROCEDURE — 99214 OFFICE O/P EST MOD 30 MIN: CPT | Performed by: NURSE PRACTITIONER

## 2022-07-13 RX ORDER — SILDENAFIL 25 MG/1
75-100 TABLET, FILM COATED ORAL PRN
COMMUNITY

## 2022-07-13 ASSESSMENT — ENCOUNTER SYMPTOMS
SHORTNESS OF BREATH: 0
EYES NEGATIVE: 1
ALLERGIC/IMMUNOLOGIC NEGATIVE: 1
CHEST TIGHTNESS: 0
ABDOMINAL PAIN: 0
RESPIRATORY NEGATIVE: 1

## 2022-07-13 NOTE — PATIENT INSTRUCTIONS
Patient Education        DASH Diet: Care Instructions  Your Care Instructions     The DASH diet is an eating plan that can help lower your blood pressure. DASH stands for Dietary Approaches to Stop Hypertension. Hypertension is high bloodpressure. The DASH diet focuses on eating foods that are high in calcium, potassium, and magnesium. These nutrients can lower blood pressure. The foods that are highest in these nutrients are fruits, vegetables, low-fat dairy products, nuts, seeds, and legumes. But taking calcium, potassium, and magnesium supplements instead of eating foods that are high in those nutrients does not have the same effect. The DASH diet also includes whole grains, fish, and poultry. The DASH diet is one of several lifestyle changes your doctor may recommend to lower your high blood pressure. Your doctor may also want you to decrease the amount of sodium in your diet. Lowering sodium while following the DASH dietcan lower blood pressure even further than just the DASH diet alone. Follow-up care is a key part of your treatment and safety. Be sure to make and go to all appointments, and call your doctor if you are having problems. It's also a good idea to know your test results and keep alist of the medicines you take. How can you care for yourself at home? Following the DASH diet   Eat 4 to 5 servings of fruit each day. A serving is 1 medium-sized piece of fruit, ½ cup chopped or canned fruit, 1/4 cup dried fruit, or 4 ounces (½ cup) of fruit juice. Choose fruit more often than fruit juice.  Eat 4 to 5 servings of vegetables each day. A serving is 1 cup of lettuce or raw leafy vegetables, ½ cup of chopped or cooked vegetables, or 4 ounces (½ cup) of vegetable juice. Choose vegetables more often than vegetable juice.  Get 2 to 3 servings of low-fat and fat-free dairy each day. A serving is 8 ounces of milk, 1 cup of yogurt, or 1 ½ ounces of cheese.  Eat 6 to 8 servings of grains each day.  A serving is 1 slice of bread, 1 ounce of dry cereal, or ½ cup of cooked rice, pasta, or cooked cereal. Try to choose whole-grain products as much as possible.  Limit lean meat, poultry, and fish to 2 servings each day. A serving is 3 ounces, about the size of a deck of cards.  Eat 4 to 5 servings of nuts, seeds, and legumes (cooked dried beans, lentils, and split peas) each week. A serving is 1/3 cup of nuts, 2 tablespoons of seeds, or ½ cup of cooked beans or peas.  Limit fats and oils to 2 to 3 servings each day. A serving is 1 teaspoon of vegetable oil or 2 tablespoons of salad dressing.  Limit sweets and added sugars to 5 servings or less a week. A serving is 1 tablespoon jelly or jam, ½ cup sorbet, or 1 cup of lemonade.  Eat less than 2,300 milligrams (mg) of sodium a day. If you limit your sodium to 1,500 mg a day, you can lower your blood pressure even more.  Be aware that all of these are the suggested number of servings for people who eat 1,800 to 2,000 calories a day. Your recommended number of servings may be different if you need more or fewer calories. Tips for success   Start small. Do not try to make dramatic changes to your diet all at once. You might feel that you are missing out on your favorite foods and then be more likely to not follow the plan. Make small changes, and stick with them. Once those changes become habit, add a few more changes.  Try some of the following:  ? Make it a goal to eat a fruit or vegetable at every meal and at snacks. This will make it easy to get the recommended amount of fruits and vegetables each day. ? Try yogurt topped with fruit and nuts for a snack or healthy dessert. ? Add lettuce, tomato, cucumber, and onion to sandwiches. ? Combine a ready-made pizza crust with low-fat mozzarella cheese and lots of vegetable toppings. Try using tomatoes, squash, spinach, broccoli, carrots, cauliflower, and onions. ?  Have a variety of cut-up vegetables with a low-fat dip as an appetizer instead of chips and dip. ? Sprinkle sunflower seeds or chopped almonds over salads. Or try adding chopped walnuts or almonds to cooked vegetables. ? Try some vegetarian meals using beans and peas. Add garbanzo or kidney beans to salads. Make burritos and tacos with mashed pereira beans or black beans. Where can you learn more? Go to https://PlanG.Win[employer]. org and sign in to your [employer] account. Enter V040 in the [employer] box to learn more about \"DASH Diet: Care Instructions. \"     If you do not have an account, please click on the \"Sign Up Now\" link. Current as of: January 10, 2022               Content Version: 13.3  © 8223-1866 [employer]. Care instructions adapted under license by [facility] If you have questions about a medical condition or this instruction, always ask your healthcare professional. [address] any warranty or liability for your use of this information.

## 2022-07-13 NOTE — PROGRESS NOTES
Subjective:      Patient ID: Gricelda Ortez is a 74 y.o. male. Chief Complaint   Patient presents with    Hypertension    Hyperlipidemia        HPI     Patient presents today to f/u on chronic conditions. Patient reports RA is fairly well controlled with current treatment. Patient follows closely with rheumatology. Hypertension:  Home blood pressure monitoring: Yes - has low blood pressures when takes sildenafil. He is not adherent to a low sodium diet. Patient denies chest pain, shortness of breath, headache, lightheadedness, blurred vision, peripheral edema, palpitations, dry cough and fatigue. Antihypertensive medication side effects: no medication side effects noted. Use of agents associated with hypertension: none. Sodium (mmol/L)   Date Value   12/06/2021 135 (L)    BUN (mg/dL)   Date Value   12/06/2021 13    Glucose (mg/dL)   Date Value   12/06/2021 97      Potassium (mmol/L)   Date Value   12/06/2021 4.5     Potassium reflex Magnesium (mmol/L)   Date Value   08/21/2021 3.9    CREATININE (mg/dL)   Date Value   12/06/2021 0.8           Review of Systems   Constitutional: Negative. Negative for activity change, appetite change, chills, fatigue and unexpected weight change. HENT: Negative. Eyes: Negative. Respiratory: Negative. Negative for chest tightness and shortness of breath. Cardiovascular: Negative. Negative for chest pain, palpitations and leg swelling. Gastrointestinal: Negative for abdominal pain. Endocrine: Negative. Genitourinary: Negative. Musculoskeletal: Positive for arthralgias. Skin: Negative. Negative for rash and wound. Allergic/Immunologic: Negative. Neurological: Negative. Negative for dizziness, light-headedness and headaches. Hematological: Negative. Psychiatric/Behavioral: Negative. Negative for dysphoric mood.        Patient Active Problem List   Diagnosis    Muscular weakness    Depression    Acid reflux    Hyperlipidemia    Hypertension    Muscle pain    Hearing decreased    Arthritis    Enlarged prostate    Chronic fatigue    Dizziness    Palpitations    Rheumatoid arthritis (HCC)    History of tobacco abuse    Hx of colonic polyps    Fatty liver    Chronic liver disease due to alcohol ([facility] [address]       No outpatient medications have been marked as taking for the 22 encounter (Appointment) with SATISH Sumner CNP. Allergies   Allergen Reactions    Codeine      Not sure of reaction    Pcn [Penicillins]      Not sure of reaction       Social History     Tobacco Use    Smoking status: Former Smoker     Packs/day: 2.00     Years: 15.00     Pack years: 30.00     Quit date: 1993     Years since quittin.9    Smokeless tobacco: Current User     Types: Snuff    Tobacco comment: chews daily   Substance Use Topics    Alcohol use: Yes     Alcohol/week: 30.0 - 35.0 standard drinks     Types: 30 - 35 Cans of beer per week     Comment: PER WIFE       Objective:   /89   Pulse 71   SpO2 92%     Physical Exam  Vitals and nursing note reviewed. Constitutional:       General: He is not in acute distress. Appearance: Normal appearance. He is well-developed and well-groomed. He is not ill-appearing or toxic-appearing. HENT:      Head: Normocephalic and atraumatic. Eyes:      Extraocular Movements: Extraocular movements intact. Conjunctiva/sclera: Conjunctivae normal.   Cardiovascular:      Rate and Rhythm: Normal rate and regular rhythm. Pulses: Normal pulses. Heart sounds: Normal heart sounds, S1 normal and S2 normal.   Pulmonary:      Effort: Pulmonary effort is normal. No accessory muscle usage or respiratory distress. Breath sounds: Normal breath sounds. Abdominal:      General: Bowel sounds are normal.      Palpations: Abdomen is soft. Musculoskeletal:      Cervical back: Neck supple. Right lower leg: No edema.       Left lower leg: No edema. Lymphadenopathy:      Cervical: No cervical adenopathy. Skin:     General: Skin is warm and moist.      Capillary Refill: Capillary refill takes less than 2 seconds. Findings: No rash. Neurological:      General: No focal deficit present. Mental Status: He is alert and oriented to person, place, and time. Mental status is at baseline. Psychiatric:         Attention and Perception: Attention normal.         Mood and Affect: Mood normal.         Speech: Speech normal.         Behavior: Behavior normal. Behavior is cooperative. Assessment/Plan:   1. Primary hypertension  Patient presents today to follow-up on chronic conditions. Patient's wife does monitor his blood pressure intermittently. Patient does have experienced symptomatic hypotension with use of sildenafil otherwise blood pressure has been well controlled with metoprolol, amlodipine and lisinopril. Advised patient to hold off on use of follow-up with urology to discuss other treatment options. Patient verbalized understanding.  - CBC with Auto Differential  - Comprehensive Metabolic Panel    2. Mixed hyperlipidemia  Patient does not follow a low-fat diet and is not currently taking medication to lower cholesterol. Patient also with a history of chronic liver disease related to alcohol intake which the risk of taking a statin would likely outweigh the benefit. 3. Rheumatoid arthritis involving multiple sites, unspecified whether rheumatoid factor present ([address]  Currently well controlled with Monroe Richardson and sulindac. Patient follows with rheumatology. 4. Fatty liver  See #5    5. 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 Monitor.  - Vitamin B12 & Folate    6. Prostate cancer screening  - PSA Screening    7. Screening for AAA (abdominal aortic aneurysm)  Recommend ultrasound for AAA screening. Patient agreeable. Order provided. - US SCREENING FOR AAA;  Future

## 2022-08-08 ENCOUNTER — HOSPITAL ENCOUNTER (OUTPATIENT)
Dept: ULTRASOUND IMAGING | Age: 67
Discharge: HOME | End: 2022-08-08
Payer: MEDICARE

## 2022-08-08 DIAGNOSIS — Z13.6 SCREENING FOR AAA (ABDOMINAL AORTIC ANEURYSM): ICD-10-CM

## 2022-08-08 PROCEDURE — 76706 US ABDL AORTA SCREEN AAA: CPT

## 2022-11-08 ENCOUNTER — NURSE ONLY (OUTPATIENT)
Dept: FAMILY MEDICINE CLINIC | Age: 67
End: 2022-11-08
Payer: COMMERCIAL

## 2022-11-08 DIAGNOSIS — Z23 NEED FOR INFLUENZA VACCINATION: Primary | ICD-10-CM

## 2022-11-08 PROCEDURE — 90694 VACC AIIV4 NO PRSRV 0.5ML IM: CPT | Performed by: NURSE PRACTITIONER

## 2022-11-08 PROCEDURE — G0008 ADMIN INFLUENZA VIRUS VAC: HCPCS | Performed by: NURSE PRACTITIONER

## 2022-12-05 DIAGNOSIS — I10 ESSENTIAL HYPERTENSION: ICD-10-CM

## 2022-12-05 RX ORDER — LISINOPRIL 30 MG/1
TABLET ORAL
Qty: 90 TABLET | Refills: 3 | Status: SHIPPED | OUTPATIENT
Start: 2022-12-05

## 2022-12-27 RX ORDER — METOPROLOL SUCCINATE 25 MG/1
25 TABLET, EXTENDED RELEASE ORAL DAILY
Qty: 90 TABLET | Refills: 1 | Status: SHIPPED | OUTPATIENT
Start: 2022-12-27

## 2022-12-27 NOTE — TELEPHONE ENCOUNTER
Future appt scheduled 01/16/2023                     Last appt 07/13/2022      Last Written 06/30/2022    metoprolol succinate (TOPROL XL) 25 MG extended release tablet  #90  1 RF

## 2023-01-05 RX ORDER — AMLODIPINE BESYLATE 5 MG/1
5 TABLET ORAL DAILY
Qty: 90 TABLET | Refills: 3 | Status: SHIPPED | OUTPATIENT
Start: 2023-01-05

## 2023-01-14 DIAGNOSIS — F33.42 RECURRENT MAJOR DEPRESSIVE DISORDER, IN FULL REMISSION (HCC): ICD-10-CM

## 2023-01-16 ENCOUNTER — OFFICE VISIT (OUTPATIENT)
Dept: FAMILY MEDICINE CLINIC | Age: 68
End: 2023-01-16

## 2023-01-16 VITALS
SYSTOLIC BLOOD PRESSURE: 121 MMHG | HEIGHT: 70 IN | DIASTOLIC BLOOD PRESSURE: 77 MMHG | HEART RATE: 66 BPM | WEIGHT: 197.2 LBS | BODY MASS INDEX: 28.23 KG/M2 | OXYGEN SATURATION: 92 % | TEMPERATURE: 97.7 F

## 2023-01-16 DIAGNOSIS — K70.9 CHRONIC LIVER DISEASE DUE TO ALCOHOL (HCC): ICD-10-CM

## 2023-01-16 DIAGNOSIS — M06.9 RHEUMATOID ARTHRITIS INVOLVING MULTIPLE SITES, UNSPECIFIED WHETHER RHEUMATOID FACTOR PRESENT (HCC): ICD-10-CM

## 2023-01-16 DIAGNOSIS — I10 PRIMARY HYPERTENSION: Primary | ICD-10-CM

## 2023-01-16 DIAGNOSIS — E78.2 MIXED HYPERLIPIDEMIA: ICD-10-CM

## 2023-01-16 DIAGNOSIS — Z23 NEED FOR VACCINATION FOR PNEUMOCOCCUS: ICD-10-CM

## 2023-01-16 DIAGNOSIS — F33.42 RECURRENT MAJOR DEPRESSIVE DISORDER, IN FULL REMISSION (HCC): ICD-10-CM

## 2023-01-16 LAB
A/G RATIO: 1.6 (ref 1.1–2.2)
ALBUMIN SERPL-MCNC: 4.2 G/DL (ref 3.4–5)
ALP BLD-CCNC: 57 U/L (ref 40–129)
ALT SERPL-CCNC: 81 U/L (ref 10–40)
ANION GAP SERPL CALCULATED.3IONS-SCNC: 14 MMOL/L (ref 3–16)
AST SERPL-CCNC: 67 U/L (ref 15–37)
BASOPHILS ABSOLUTE: 0 K/UL (ref 0–0.2)
BASOPHILS RELATIVE PERCENT: 0.5 %
BILIRUB SERPL-MCNC: 0.4 MG/DL (ref 0–1)
BUN BLDV-MCNC: 9 MG/DL (ref 7–20)
CALCIUM SERPL-MCNC: 9.4 MG/DL (ref 8.3–10.6)
CHLORIDE BLD-SCNC: 96 MMOL/L (ref 99–110)
CHOLESTEROL, TOTAL: 249 MG/DL (ref 0–199)
CO2: 22 MMOL/L (ref 21–32)
CREAT SERPL-MCNC: 0.7 MG/DL (ref 0.8–1.3)
EOSINOPHILS ABSOLUTE: 0.1 K/UL (ref 0–0.6)
EOSINOPHILS RELATIVE PERCENT: 1.7 %
GFR SERPL CREATININE-BSD FRML MDRD: >60 ML/MIN/{1.73_M2}
GLUCOSE BLD-MCNC: 76 MG/DL (ref 70–99)
HCT VFR BLD CALC: 39.5 % (ref 40.5–52.5)
HDLC SERPL-MCNC: 64 MG/DL (ref 40–60)
HEMOGLOBIN: 13.3 G/DL (ref 13.5–17.5)
LDL CHOLESTEROL CALCULATED: 164 MG/DL
LYMPHOCYTES ABSOLUTE: 0.7 K/UL (ref 1–5.1)
LYMPHOCYTES RELATIVE PERCENT: 18.6 %
MCH RBC QN AUTO: 35.4 PG (ref 26–34)
MCHC RBC AUTO-ENTMCNC: 33.8 G/DL (ref 31–36)
MCV RBC AUTO: 104.7 FL (ref 80–100)
MONOCYTES ABSOLUTE: 0.4 K/UL (ref 0–1.3)
MONOCYTES RELATIVE PERCENT: 11.9 %
NEUTROPHILS ABSOLUTE: 2.5 K/UL (ref 1.7–7.7)
NEUTROPHILS RELATIVE PERCENT: 67.3 %
PDW BLD-RTO: 13.3 % (ref 12.4–15.4)
PLATELET # BLD: 207 K/UL (ref 135–450)
PMV BLD AUTO: 7.9 FL (ref 5–10.5)
POTASSIUM SERPL-SCNC: 4.4 MMOL/L (ref 3.5–5.1)
RBC # BLD: 3.77 M/UL (ref 4.2–5.9)
SODIUM BLD-SCNC: 132 MMOL/L (ref 136–145)
TOTAL PROTEIN: 6.9 G/DL (ref 6.4–8.2)
TRIGL SERPL-MCNC: 105 MG/DL (ref 0–150)
VLDLC SERPL CALC-MCNC: 21 MG/DL
WBC # BLD: 3.7 K/UL (ref 4–11)

## 2023-01-16 PROCEDURE — 90471 IMMUNIZATION ADMIN: CPT | Performed by: NURSE PRACTITIONER

## 2023-01-16 PROCEDURE — 99214 OFFICE O/P EST MOD 30 MIN: CPT | Performed by: NURSE PRACTITIONER

## 2023-01-16 PROCEDURE — 3078F DIAST BP <80 MM HG: CPT | Performed by: NURSE PRACTITIONER

## 2023-01-16 PROCEDURE — 1123F ACP DISCUSS/DSCN MKR DOCD: CPT | Performed by: NURSE PRACTITIONER

## 2023-01-16 PROCEDURE — 36415 COLL VENOUS BLD VENIPUNCTURE: CPT | Performed by: NURSE PRACTITIONER

## 2023-01-16 PROCEDURE — 90677 PCV20 VACCINE IM: CPT | Performed by: NURSE PRACTITIONER

## 2023-01-16 PROCEDURE — 3074F SYST BP LT 130 MM HG: CPT | Performed by: NURSE PRACTITIONER

## 2023-01-16 RX ORDER — FLUOXETINE HYDROCHLORIDE 40 MG/1
CAPSULE ORAL
Qty: 180 CAPSULE | Refills: 3 | Status: SHIPPED | OUTPATIENT
Start: 2023-01-16

## 2023-01-16 RX ORDER — BUPROPION HYDROCHLORIDE 150 MG/1
150 TABLET ORAL EVERY MORNING
Qty: 30 TABLET | Refills: 5 | Status: SHIPPED | OUTPATIENT
Start: 2023-01-16

## 2023-01-16 ASSESSMENT — PATIENT HEALTH QUESTIONNAIRE - PHQ9
4. FEELING TIRED OR HAVING LITTLE ENERGY: 1
7. TROUBLE CONCENTRATING ON THINGS, SUCH AS READING THE NEWSPAPER OR WATCHING TELEVISION: 0
8. MOVING OR SPEAKING SO SLOWLY THAT OTHER PEOPLE COULD HAVE NOTICED. OR THE OPPOSITE, BEING SO FIGETY OR RESTLESS THAT YOU HAVE BEEN MOVING AROUND A LOT MORE THAN USUAL: 0
10. IF YOU CHECKED OFF ANY PROBLEMS, HOW DIFFICULT HAVE THESE PROBLEMS MADE IT FOR YOU TO DO YOUR WORK, TAKE CARE OF THINGS AT HOME, OR GET ALONG WITH OTHER PEOPLE: 0
SUM OF ALL RESPONSES TO PHQ QUESTIONS 1-9: 3
2. FEELING DOWN, DEPRESSED OR HOPELESS: 1
6. FEELING BAD ABOUT YOURSELF - OR THAT YOU ARE A FAILURE OR HAVE LET YOURSELF OR YOUR FAMILY DOWN: 0
3. TROUBLE FALLING OR STAYING ASLEEP: 0
SUM OF ALL RESPONSES TO PHQ QUESTIONS 1-9: 3
9. THOUGHTS THAT YOU WOULD BE BETTER OFF DEAD, OR OF HURTING YOURSELF: 0
SUM OF ALL RESPONSES TO PHQ QUESTIONS 1-9: 3
5. POOR APPETITE OR OVEREATING: 1
SUM OF ALL RESPONSES TO PHQ QUESTIONS 1-9: 3

## 2023-01-16 ASSESSMENT — ENCOUNTER SYMPTOMS
ABDOMINAL PAIN: 0
RESPIRATORY NEGATIVE: 1
SHORTNESS OF BREATH: 0
ALLERGIC/IMMUNOLOGIC NEGATIVE: 1
CHEST TIGHTNESS: 0
EYES NEGATIVE: 1

## 2023-01-16 NOTE — PROGRESS NOTES
Subjective:      Patient ID: Pascale Alarcon is a 74 y.o. male. Chief Complaint   Patient presents with    Check-Up     RA        HPI    Patient presents today to follow-up on chronic conditions. Patient denies any acute problems or concerns and is fasting today for blood work. Patient is agreeable to have pneumococcal vaccine. Hypertension:  Home blood pressure monitoring: Yes - wnl. He is not adherent to a low sodium diet. Patient denies chest pain, shortness of breath, headache, lightheadedness, blurred vision, peripheral edema, palpitations, dry cough, and fatigue. Antihypertensive medication side effects: no medication side effects noted. Use of agents associated with hypertension: NSAIDS. Sodium (mmol/L)   Date Value   07/13/2022 137    BUN (mg/dL)   Date Value   07/13/2022 11    Glucose (mg/dL)   Date Value   07/13/2022 78      Potassium (mmol/L)   Date Value   07/13/2022 4.2     Potassium reflex Magnesium (mmol/L)   Date Value   08/21/2021 3.9    Creatinine (mg/dL)   Date Value   07/13/2022 0.7 (L)             Review of Systems   Constitutional: Negative. Negative for activity change, appetite change, chills, fatigue and unexpected weight change. HENT: Negative. Eyes: Negative. Respiratory: Negative. Negative for chest tightness and shortness of breath. Cardiovascular: Negative. Negative for chest pain, palpitations and leg swelling. Gastrointestinal:  Negative for abdominal pain. Endocrine: Negative. Genitourinary: Negative. Musculoskeletal:  Positive for arthralgias. Skin: Negative. Negative for rash and wound. Allergic/Immunologic: Negative. Neurological: Negative. Negative for dizziness, light-headedness and headaches. Hematological: Negative. Psychiatric/Behavioral:  Positive for dysphoric mood.       Patient Active Problem List   Diagnosis    Muscular weakness    Depression    Acid reflux    Hyperlipidemia Hypertension    Muscle pain    Hearing decreased    Arthritis    Enlarged prostate    Chronic fatigue    Dizziness    Palpitations    Rheumatoid arthritis ([facility] [address]    History of tobacco abuse    Hx of colonic polyps    Fatty liver    Chronic liver disease due to alcohol (HCC)    Recurrent major depressive disorder, in full remission ([facility] [address]       No outpatient medications have been marked as taking for the 23 encounter (Appointment) with SATISH Snow CNP. Allergies   Allergen Reactions    Codeine      Not sure of reaction    Pcn [Penicillins]      Not sure of reaction       Social History     Tobacco Use    Smoking status: Former     Packs/day: 2.00     Years: 15.00     Pack years: 30.00     Types: Cigarettes     Quit date: 1993     Years since quittin.5    Smokeless tobacco: Current     Types: Snuff    Tobacco comments:     chews daily   Substance Use Topics    Alcohol use: Yes     Alcohol/week: 30.0 - 35.0 standard drinks     Types: 30 - 35 Cans of beer per week     Comment: PER WIFE       Objective:   /77   Pulse 66   Temp 97.7 °F (36.5 °C) (Oral)   Ht 5' 10\" (1.778 m)   Wt 197 lb 3.2 oz (89.4 kg)   SpO2 92%   BMI 28.30 kg/m²     Physical Exam  Vitals and nursing note reviewed. Constitutional:       General: He is not in acute distress. Appearance: Normal appearance. He is well-developed and well-groomed. He is not ill-appearing or toxic-appearing. HENT:      Head: Normocephalic and atraumatic. Eyes:      Extraocular Movements: Extraocular movements intact. Conjunctiva/sclera: Conjunctivae normal.   Cardiovascular:      Rate and Rhythm: Normal rate and regular rhythm. Pulses: Normal pulses. Heart sounds: Normal heart sounds, S1 normal and S2 normal.   Pulmonary:      Effort: Pulmonary effort is normal. No accessory muscle usage or respiratory distress. Breath sounds: Normal breath sounds.    Abdominal:      General: Bowel sounds are normal. Palpations: Abdomen is soft. Musculoskeletal:      Cervical back: Neck supple. Right lower leg: No edema. Left lower leg: No edema. Lymphadenopathy:      Cervical: No cervical adenopathy. Skin:     General: Skin is warm and moist.      Capillary Refill: Capillary refill takes less than 2 seconds. Findings: No rash. Neurological:      General: No focal deficit present. Mental Status: He is alert and oriented to person, place, and time. Mental status is at baseline. Psychiatric:         Attention and Perception: Attention normal.         Mood and Affect: Mood normal. Mood is not depressed. Speech: Speech normal.         Behavior: Behavior normal. Behavior is cooperative. Thought Content: Thought content is not paranoid or delusional. Thought content does not include homicidal or suicidal ideation. Thought content does not include homicidal or suicidal plan. Assessment/Plan:   1. Primary hypertension  Patient presents today to follow-up on chronic conditions. Patient continues to take metoprolol, lisinopril and amlodipine without any side effects. Patient does monitor his blood pressure at home and it has been well controlled. Recommend patient continue with current treatment. Order for fasting labs today. Patient verbalized understanding agreeable to plan  - CBC with Auto Differential  - Comprehensive Metabolic Panel    2. Mixed hyperlipidemia  Statin on hold due to elevated liver enzymes. Patient states he continues to drink alcohol daily. Typically 8-12 beers a day. Patient also routinely uses marijuana. Recommend patient consider reducing alcohol use due to elevated liver enzymes. Patient states he will consider.  - Lipid Panel    3. Recurrent major depressive disorder, in full remission [facility])  Patient with complaints of increased depression over the winter months. Patient continues to take fluoxetine as ordered.   Discussed other treatment options recommend patient trial Wellbutrin. Advised to follow-up if no better or worsening of symptoms. Patient verbalized understanding agreeable to plan. - buPROPion (WELLBUTRIN XL) 150 MG extended release tablet; Take 1 tablet by mouth every morning  Dispense: 30 tablet; Refill: 5    4. Chronic liver disease due to alcohol ([facility]  Recommend patient reduce alcohol intake due to high liver enzymes. See #2.    5. Rheumatoid arthritis involving multiple sites, unspecified whether rheumatoid factor present [facility])  Patient reports symptoms are currently well controlled. Patient continues to follow with rheumatology. 6. Need for vaccination for pneumococcus  Patient agreeable to pneumococcal vaccine today.   - Pneumococcal, PCV20, PREVNAR 20, (age 25 yrs+), IM, PF

## 2023-01-16 NOTE — PATIENT INSTRUCTIONS
Please read the healthy family handout that you were given and share it with your family. Please compare this printed medication list with your medications at home to be sure they are the same. If you have any medications that are different please contact us immediately at [phone]. Also review your allergies that we have listed, these may also include medications that you have not been able to tolerate, make sure everything listed is correct. If you have any allergies that are different please contact us immediately at [phone]. You may receive a survey in the mail or by email asking about your experience during your visit today. Please complete and return to us so we know how we are serving you.

## 2023-01-18 RX ORDER — SIMVASTATIN 20 MG
20 TABLET ORAL NIGHTLY
Qty: 30 TABLET | Refills: 5 | Status: SHIPPED | OUTPATIENT
Start: 2023-01-18

## 2023-01-18 RX ORDER — SIMVASTATIN 20 MG
20 TABLET ORAL NIGHTLY
Qty: 30 TABLET | Refills: 0 | Status: CANCELLED | OUTPATIENT
Start: 2023-01-18

## 2023-01-31 RX ORDER — TERAZOSIN 5 MG/1
CAPSULE ORAL
Qty: 90 CAPSULE | Refills: 1 | Status: SHIPPED | OUTPATIENT
Start: 2023-01-31

## 2023-02-07 DIAGNOSIS — F33.42 RECURRENT MAJOR DEPRESSIVE DISORDER, IN FULL REMISSION (HCC): ICD-10-CM

## 2023-02-07 RX ORDER — SIMVASTATIN 20 MG
20 TABLET ORAL NIGHTLY
Qty: 90 TABLET | Refills: 1 | Status: SHIPPED | OUTPATIENT
Start: 2023-02-07

## 2023-02-07 RX ORDER — BUPROPION HYDROCHLORIDE 150 MG/1
150 TABLET ORAL EVERY MORNING
Qty: 90 TABLET | Refills: 1 | Status: SHIPPED | OUTPATIENT
Start: 2023-02-07

## 2023-02-26 DIAGNOSIS — I10 ESSENTIAL HYPERTENSION: ICD-10-CM

## 2023-02-27 DIAGNOSIS — I10 ESSENTIAL HYPERTENSION: ICD-10-CM

## 2023-02-27 RX ORDER — FINASTERIDE 5 MG/1
5 TABLET, FILM COATED ORAL DAILY
Qty: 30 TABLET | Refills: 0 | Status: SHIPPED | OUTPATIENT
Start: 2023-02-27

## 2023-02-27 RX ORDER — POTASSIUM CHLORIDE 750 MG/1
10 TABLET, FILM COATED, EXTENDED RELEASE ORAL DAILY
Qty: 90 TABLET | Refills: 3 | Status: SHIPPED | OUTPATIENT
Start: 2023-02-27

## 2023-02-27 RX ORDER — SULINDAC 150 MG/1
TABLET ORAL
Qty: 60 TABLET | Refills: 11 | Status: SHIPPED | OUTPATIENT
Start: 2023-02-27

## 2023-02-27 RX ORDER — POTASSIUM CHLORIDE 750 MG/1
10 TABLET, FILM COATED, EXTENDED RELEASE ORAL DAILY
Qty: 90 TABLET | Refills: 3 | OUTPATIENT
Start: 2023-02-27

## 2023-02-27 NOTE — TELEPHONE ENCOUNTER
Pt  spouse called and requesting these three meds be sent into their mail order pharmacy for refill       Future appt scheduled 07/19/2023             Last appt 01/16/2023

## 2023-03-16 DIAGNOSIS — I10 ESSENTIAL HYPERTENSION: ICD-10-CM

## 2023-03-16 RX ORDER — TERAZOSIN 5 MG/1
CAPSULE ORAL
Qty: 90 CAPSULE | Refills: 1 | Status: SHIPPED | OUTPATIENT
Start: 2023-03-16

## 2023-03-16 RX ORDER — METOPROLOL SUCCINATE 25 MG/1
25 TABLET, EXTENDED RELEASE ORAL DAILY
Qty: 90 TABLET | Refills: 1 | Status: SHIPPED | OUTPATIENT
Start: 2023-03-16

## 2023-03-16 RX ORDER — LISINOPRIL 30 MG/1
TABLET ORAL
Qty: 90 TABLET | Refills: 1 | Status: SHIPPED | OUTPATIENT
Start: 2023-03-16

## 2023-03-30 RX ORDER — FINASTERIDE 5 MG/1
5 TABLET, FILM COATED ORAL DAILY
Qty: 90 TABLET | Refills: 1 | Status: SHIPPED | OUTPATIENT
Start: 2023-03-30

## 2023-04-19 DIAGNOSIS — F33.42 RECURRENT MAJOR DEPRESSIVE DISORDER, IN FULL REMISSION (HCC): ICD-10-CM

## 2023-04-19 RX ORDER — AMLODIPINE BESYLATE 5 MG/1
5 TABLET ORAL DAILY
Qty: 90 TABLET | Refills: 3 | Status: SHIPPED | OUTPATIENT
Start: 2023-04-19

## 2023-04-19 RX ORDER — TAMSULOSIN HYDROCHLORIDE 0.4 MG/1
0.4 CAPSULE ORAL DAILY
Qty: 90 CAPSULE | Refills: 3 | Status: SHIPPED | OUTPATIENT
Start: 2023-04-19 | End: 2023-07-18

## 2023-04-19 RX ORDER — FLUOXETINE HYDROCHLORIDE 40 MG/1
CAPSULE ORAL
Qty: 180 CAPSULE | Refills: 3 | Status: SHIPPED | OUTPATIENT
Start: 2023-04-19

## 2023-04-27 RX ORDER — AMLODIPINE BESYLATE 5 MG/1
5 TABLET ORAL DAILY
Qty: 90 TABLET | Refills: 3 | Status: SHIPPED | OUTPATIENT
Start: 2023-04-27

## 2023-04-27 RX ORDER — AMLODIPINE BESYLATE 5 MG/1
5 TABLET ORAL DAILY
Qty: 7 TABLET | Refills: 0 | Status: SHIPPED | OUTPATIENT
Start: 2023-04-27

## 2023-05-25 DIAGNOSIS — F33.42 RECURRENT MAJOR DEPRESSIVE DISORDER, IN FULL REMISSION (HCC): ICD-10-CM

## 2023-05-25 RX ORDER — BUPROPION HYDROCHLORIDE 150 MG/1
150 TABLET ORAL EVERY MORNING
Qty: 90 TABLET | Refills: 1 | Status: SHIPPED | OUTPATIENT
Start: 2023-05-25

## 2023-07-18 ASSESSMENT — ENCOUNTER SYMPTOMS
EYES NEGATIVE: 1
CHEST TIGHTNESS: 0
SHORTNESS OF BREATH: 0
ALLERGIC/IMMUNOLOGIC NEGATIVE: 1
ABDOMINAL PAIN: 0
RESPIRATORY NEGATIVE: 1

## 2023-07-18 NOTE — PROGRESS NOTES
involving multiple sites, unspecified whether rheumatoid factor present [facility])  Patient follows closely with rheumatology. Patient reports symptoms are currently well controlled. Recommend patient continue to follow-up with rheumatology as advised.     6. Prostate cancer screening  - PSA Screening

## 2023-07-19 ENCOUNTER — OFFICE VISIT (OUTPATIENT)
Dept: FAMILY MEDICINE CLINIC | Age: 68
End: 2023-07-19
Payer: MEDICARE

## 2023-07-19 VITALS
HEART RATE: 71 BPM | SYSTOLIC BLOOD PRESSURE: 138 MMHG | DIASTOLIC BLOOD PRESSURE: 86 MMHG | OXYGEN SATURATION: 93 % | TEMPERATURE: 98.2 F | BODY MASS INDEX: 27.29 KG/M2 | WEIGHT: 190.2 LBS

## 2023-07-19 DIAGNOSIS — F33.42 RECURRENT MAJOR DEPRESSIVE DISORDER, IN FULL REMISSION (HCC): ICD-10-CM

## 2023-07-19 DIAGNOSIS — M06.9 RHEUMATOID ARTHRITIS INVOLVING MULTIPLE SITES, UNSPECIFIED WHETHER RHEUMATOID FACTOR PRESENT (HCC): ICD-10-CM

## 2023-07-19 DIAGNOSIS — K70.9 CHRONIC LIVER DISEASE DUE TO ALCOHOL (HCC): ICD-10-CM

## 2023-07-19 DIAGNOSIS — Z12.5 PROSTATE CANCER SCREENING: ICD-10-CM

## 2023-07-19 DIAGNOSIS — E78.00 PURE HYPERCHOLESTEROLEMIA: ICD-10-CM

## 2023-07-19 DIAGNOSIS — I10 PRIMARY HYPERTENSION: Primary | ICD-10-CM

## 2023-07-19 LAB
ALBUMIN SERPL-MCNC: 4.4 G/DL (ref 3.4–5)
ALBUMIN/GLOB SERPL: 1.9 {RATIO} (ref 1.1–2.2)
ALP SERPL-CCNC: 56 U/L (ref 40–129)
ALT SERPL-CCNC: 80 U/L (ref 10–40)
ANION GAP SERPL CALCULATED.3IONS-SCNC: 12 MMOL/L (ref 3–16)
AST SERPL-CCNC: 86 U/L (ref 15–37)
BASOPHILS # BLD: 0 K/UL (ref 0–0.2)
BASOPHILS NFR BLD: 0.6 %
BILIRUB SERPL-MCNC: 0.5 MG/DL (ref 0–1)
BUN SERPL-MCNC: 11 MG/DL (ref 7–20)
CALCIUM SERPL-MCNC: 9.3 MG/DL (ref 8.3–10.6)
CHLORIDE SERPL-SCNC: 99 MMOL/L (ref 99–110)
CHOLEST SERPL-MCNC: 169 MG/DL (ref 0–199)
CO2 SERPL-SCNC: 22 MMOL/L (ref 21–32)
CREAT SERPL-MCNC: 0.8 MG/DL (ref 0.8–1.3)
DEPRECATED RDW RBC AUTO: 13.3 % (ref 12.4–15.4)
EOSINOPHIL # BLD: 0 K/UL (ref 0–0.6)
EOSINOPHIL NFR BLD: 1 %
GFR SERPLBLD CREATININE-BSD FMLA CKD-EPI: >60 ML/MIN/{1.73_M2}
GLUCOSE SERPL-MCNC: 94 MG/DL (ref 70–99)
HCT VFR BLD AUTO: 36.9 % (ref 40.5–52.5)
HDLC SERPL-MCNC: 80 MG/DL (ref 40–60)
HGB BLD-MCNC: 12.7 G/DL (ref 13.5–17.5)
LDLC SERPL CALC-MCNC: 78 MG/DL
LYMPHOCYTES # BLD: 0.4 K/UL (ref 1–5.1)
LYMPHOCYTES NFR BLD: 12.6 %
MCH RBC QN AUTO: 36.1 PG (ref 26–34)
MCHC RBC AUTO-ENTMCNC: 34.5 G/DL (ref 31–36)
MCV RBC AUTO: 104.7 FL (ref 80–100)
MONOCYTES # BLD: 0.4 K/UL (ref 0–1.3)
MONOCYTES NFR BLD: 11.9 %
NEUTROPHILS # BLD: 2.6 K/UL (ref 1.7–7.7)
NEUTROPHILS NFR BLD: 73.9 %
PLATELET # BLD AUTO: 187 K/UL (ref 135–450)
PMV BLD AUTO: 7.6 FL (ref 5–10.5)
POTASSIUM SERPL-SCNC: 4.3 MMOL/L (ref 3.5–5.1)
PROT SERPL-MCNC: 6.7 G/DL (ref 6.4–8.2)
PSA SERPL DL<=0.01 NG/ML-MCNC: 0.18 NG/ML (ref 0–4)
RBC # BLD AUTO: 3.53 M/UL (ref 4.2–5.9)
SODIUM SERPL-SCNC: 133 MMOL/L (ref 136–145)
TRIGL SERPL-MCNC: 54 MG/DL (ref 0–150)
VLDLC SERPL CALC-MCNC: 11 MG/DL
WBC # BLD AUTO: 3.5 K/UL (ref 4–11)

## 2023-07-19 PROCEDURE — 1123F ACP DISCUSS/DSCN MKR DOCD: CPT | Performed by: NURSE PRACTITIONER

## 2023-07-19 PROCEDURE — 3079F DIAST BP 80-89 MM HG: CPT | Performed by: NURSE PRACTITIONER

## 2023-07-19 PROCEDURE — 36415 COLL VENOUS BLD VENIPUNCTURE: CPT | Performed by: NURSE PRACTITIONER

## 2023-07-19 PROCEDURE — 3075F SYST BP GE 130 - 139MM HG: CPT | Performed by: NURSE PRACTITIONER

## 2023-07-19 PROCEDURE — 99214 OFFICE O/P EST MOD 30 MIN: CPT | Performed by: NURSE PRACTITIONER

## 2023-08-30 RX ORDER — SIMVASTATIN 20 MG
TABLET ORAL
Qty: 90 TABLET | Refills: 1 | Status: SHIPPED | OUTPATIENT
Start: 2023-08-30

## 2023-08-30 NOTE — TELEPHONE ENCOUNTER
Future appt scheduled 09/05/2023               Last appt 07/19/2023      Last Written 02/07/2023    simvastatin (ZOCOR) 20 MG tablet  #90  1 RF

## 2023-09-05 ENCOUNTER — OFFICE VISIT (OUTPATIENT)
Dept: FAMILY MEDICINE CLINIC | Age: 68
End: 2023-09-05
Payer: MEDICARE

## 2023-09-05 VITALS
OXYGEN SATURATION: 94 % | TEMPERATURE: 98.2 F | SYSTOLIC BLOOD PRESSURE: 131 MMHG | DIASTOLIC BLOOD PRESSURE: 81 MMHG | WEIGHT: 186.2 LBS | HEART RATE: 75 BPM | BODY MASS INDEX: 26.72 KG/M2

## 2023-09-05 DIAGNOSIS — T16.2XXA FOREIGN BODY OF LEFT EAR, INITIAL ENCOUNTER: ICD-10-CM

## 2023-09-05 DIAGNOSIS — I10 PRIMARY HYPERTENSION: Primary | ICD-10-CM

## 2023-09-05 DIAGNOSIS — I10 ESSENTIAL HYPERTENSION: ICD-10-CM

## 2023-09-05 PROCEDURE — 3079F DIAST BP 80-89 MM HG: CPT | Performed by: NURSE PRACTITIONER

## 2023-09-05 PROCEDURE — 99214 OFFICE O/P EST MOD 30 MIN: CPT | Performed by: NURSE PRACTITIONER

## 2023-09-05 PROCEDURE — 3075F SYST BP GE 130 - 139MM HG: CPT | Performed by: NURSE PRACTITIONER

## 2023-09-05 PROCEDURE — 1123F ACP DISCUSS/DSCN MKR DOCD: CPT | Performed by: NURSE PRACTITIONER

## 2023-09-05 RX ORDER — TAMSULOSIN HYDROCHLORIDE 0.4 MG/1
0.4 CAPSULE ORAL NIGHTLY
Qty: 90 CAPSULE | Refills: 0
Start: 2023-09-05 | End: 2023-12-04

## 2023-09-05 RX ORDER — METOPROLOL SUCCINATE 25 MG/1
25 TABLET, EXTENDED RELEASE ORAL NIGHTLY
Qty: 90 TABLET | Refills: 1
Start: 2023-09-05 | End: 2023-09-05

## 2023-09-05 RX ORDER — AMLODIPINE BESYLATE 5 MG/1
5 TABLET ORAL NIGHTLY
Qty: 90 TABLET | Refills: 3
Start: 2023-09-05

## 2023-09-05 RX ORDER — FINASTERIDE 5 MG/1
TABLET, FILM COATED ORAL
Qty: 90 TABLET | Refills: 1 | Status: SHIPPED | OUTPATIENT
Start: 2023-09-05

## 2023-09-05 RX ORDER — LISINOPRIL 30 MG/1
TABLET ORAL
Qty: 90 TABLET | Refills: 1 | Status: SHIPPED | OUTPATIENT
Start: 2023-09-05

## 2023-09-05 RX ORDER — TERAZOSIN 5 MG/1
CAPSULE ORAL
Qty: 90 CAPSULE | Refills: 1 | Status: SHIPPED | OUTPATIENT
Start: 2023-09-05

## 2023-09-05 RX ORDER — METOPROLOL SUCCINATE 25 MG/1
25 TABLET, EXTENDED RELEASE ORAL NIGHTLY
Qty: 90 TABLET | Refills: 1 | Status: SHIPPED | OUTPATIENT
Start: 2023-09-05

## 2023-09-05 ASSESSMENT — ENCOUNTER SYMPTOMS
EYES NEGATIVE: 1
RESPIRATORY NEGATIVE: 1
SHORTNESS OF BREATH: 0
ABDOMINAL PAIN: 0
CHEST TIGHTNESS: 0
ALLERGIC/IMMUNOLOGIC NEGATIVE: 1

## 2023-09-05 NOTE — PATIENT INSTRUCTIONS
Please read the healthy family handout that you were given and share it with your family. Please compare this printed medication list with your medications at home to be sure they are the same. If you have any medications that are different please contact us immediately at [phone]. Also review your allergies that we have listed, these may also include medications that you have not been able to tolerate, make sure everything listed is correct. If you have any allergies that are different please contact us immediately at [phone]. You may receive a survey in the mail or by email asking about your experience during your visit today. Please complete and return to us so we know how we are serving you.

## 2023-09-05 NOTE — PROGRESS NOTES
Contacted ENT and they are able to see patient first thing in AM.  Patient agreeable.   - Anna Marie Farnsworth DO, [facility]

## 2023-09-06 ENCOUNTER — OFFICE VISIT (OUTPATIENT)
Dept: ENT CLINIC | Age: 68
End: 2023-09-06
Payer: MEDICARE

## 2023-09-06 VITALS
BODY MASS INDEX: 26.63 KG/M2 | OXYGEN SATURATION: 96 % | SYSTOLIC BLOOD PRESSURE: 131 MMHG | TEMPERATURE: 98.2 F | HEIGHT: 70 IN | WEIGHT: 186 LBS | DIASTOLIC BLOOD PRESSURE: 81 MMHG | HEART RATE: 75 BPM

## 2023-09-06 DIAGNOSIS — T16.1XXA FOREIGN BODY OF RIGHT EAR, INITIAL ENCOUNTER: Primary | ICD-10-CM

## 2023-09-06 PROCEDURE — 69200 CLEAR OUTER EAR CANAL: CPT | Performed by: OTOLARYNGOLOGY

## 2023-09-06 NOTE — PROGRESS NOTES
Chrissy Doran is a 45-year-old male here today with hearing aid dome stuck in left EAC. Removal Impacted Cerumen    An operating microscope was utilized to visualize the external auditory canals using a 3 mm speculum. An alligator forcep was used to remove the hearing aid dome from the left external auditory canal.  The tympanic membrane is intact. No fluid visualized in the middle ear. Right EAC clear, TM intact, middle ear clear. No complications.

## 2023-09-14 ENCOUNTER — TELEMEDICINE (OUTPATIENT)
Dept: FAMILY MEDICINE CLINIC | Age: 68
End: 2023-09-14
Payer: MEDICARE

## 2023-09-14 DIAGNOSIS — Z71.89 ADVANCED CARE PLANNING/COUNSELING DISCUSSION: ICD-10-CM

## 2023-09-14 DIAGNOSIS — Z00.00 MEDICARE ANNUAL WELLNESS VISIT, SUBSEQUENT: Primary | ICD-10-CM

## 2023-09-14 PROCEDURE — 1123F ACP DISCUSS/DSCN MKR DOCD: CPT | Performed by: NURSE PRACTITIONER

## 2023-09-14 PROCEDURE — G0439 PPPS, SUBSEQ VISIT: HCPCS | Performed by: NURSE PRACTITIONER

## 2023-09-14 ASSESSMENT — PATIENT HEALTH QUESTIONNAIRE - PHQ9
3. TROUBLE FALLING OR STAYING ASLEEP: 0
SUM OF ALL RESPONSES TO PHQ QUESTIONS 1-9: 0
7. TROUBLE CONCENTRATING ON THINGS, SUCH AS READING THE NEWSPAPER OR WATCHING TELEVISION: 0
SUM OF ALL RESPONSES TO PHQ QUESTIONS 1-9: 0
SUM OF ALL RESPONSES TO PHQ QUESTIONS 1-9: 0
5. POOR APPETITE OR OVEREATING: 0
4. FEELING TIRED OR HAVING LITTLE ENERGY: 0
SUM OF ALL RESPONSES TO PHQ QUESTIONS 1-9: 0
SUM OF ALL RESPONSES TO PHQ9 QUESTIONS 1 & 2: 0
8. MOVING OR SPEAKING SO SLOWLY THAT OTHER PEOPLE COULD HAVE NOTICED. OR THE OPPOSITE, BEING SO FIGETY OR RESTLESS THAT YOU HAVE BEEN MOVING AROUND A LOT MORE THAN USUAL: 0
1. LITTLE INTEREST OR PLEASURE IN DOING THINGS: 0
6. FEELING BAD ABOUT YOURSELF - OR THAT YOU ARE A FAILURE OR HAVE LET YOURSELF OR YOUR FAMILY DOWN: 0
9. THOUGHTS THAT YOU WOULD BE BETTER OFF DEAD, OR OF HURTING YOURSELF: 0
2. FEELING DOWN, DEPRESSED OR HOPELESS: 0
10. IF YOU CHECKED OFF ANY PROBLEMS, HOW DIFFICULT HAVE THESE PROBLEMS MADE IT FOR YOU TO DO YOUR WORK, TAKE CARE OF THINGS AT HOME, OR GET ALONG WITH OTHER PEOPLE: 0

## 2023-09-14 NOTE — PATIENT INSTRUCTIONS
Please read the healthy family handout that you were given and share it with your family. Please compare this printed medication list with your medications at home to be sure they are the same. If you have any medications that are different please contact us immediately at [phone]. Also review your allergies that we have listed, these may also include medications that you have not been able to tolerate, make sure everything listed is correct. If you have any allergies that are different please contact us immediately at [phone]. You may receive a survey in the mail or by email asking about your experience during your visit today. Please complete and return to us so we know how we are serving you. Substance Use Disorder: Care Instructions  Overview     You can improve your life and health by stopping your use of alcohol or drugs. When you don't drink or use drugs, you may feel and sleep better. You may get along better with your family, friends, and coworkers. There are medicines and programs that can help with substance use disorder. How can you care for yourself at home? If you have been given medicine to help keep you sober or reduce your cravings, be sure to take it exactly as prescribed. Talk to your doctor about programs that can help you stop using drugs or drinking alcohol. Do not keep alcohol or drugs in your home. Plan ahead. Think about what you'll say if other people ask you to drink or use drugs. Try not to spend time with people who drink or use drugs. Use the time and money spent on drinking or drugs to do something that's important to you. Preventing a relapse  Have a plan to deal with relapse. Learn to recognize changes in your thinking that lead you to drink or use drugs. Get help before you start to drink or use drugs again. Try to stay away from situations, friends, or places that may lead you to drink or use drugs.   If you feel the need to drink alcohol or use

## 2023-09-14 NOTE — PROGRESS NOTES
Medicare Annual Wellness Visit    Laina Newsome is here for Medicare AWV    Assessment & Plan   Medicare annual wellness visit, subsequent  Advanced care planning/counseling discussion  -     Ambulatory Referral to ACP Clinical Specialist  Recommendations for Preventive Services Due: see orders and patient instructions/AVS.  Recommended screening schedule for the next 5-10 years is provided to the patient in written form: see Patient Instructions/AVS.     Return in 1 year (on 9/14/2024). Subjective       Patient's complete Health Risk Assessment and screening values have been reviewed and are found in Flowsheets. The following problems were reviewed today and where indicated follow up appointments were made and/or referrals ordered. Positive Risk Factor Screenings with Interventions:          Drug Use:          Interpretation:  1-2: Low level - Monitor, re-assess at a later date  3-5: Moderate level - Further Investigation  6-8: Substantial level - Intensive Assessment  9-10: Severe level - Intensive Assessment    Interventions:  Patient declined any further intervention or treatment           Dentist Screen:  Have you seen the dentist within the past year?: (!) No    Intervention:  Advised to schedule with their dentist     Vision Screen:  Do you have difficulty driving, watching TV, or doing any of your daily activities because of your eyesight?: No  Have you had an eye exam within the past year?: (!) No  No results found.     Interventions:   Patient encouraged to make appointment with their eye specialist      Advanced Directives:  Do you have a Living Will?: (!) No    Intervention:  has NO advanced directive  - referred to ACP Coordinator                 Tobacco Use:  Tobacco Use: High Risk (9/14/2023)    Patient History     Smoking Tobacco Use: Former     Smokeless Tobacco Use: Current     Passive Exposure: Not on file     E-cigarette/Vaping       Questions Responses    E-cigarette/Vaping Use Never No

## 2023-09-15 ENCOUNTER — CLINICAL DOCUMENTATION (OUTPATIENT)
Dept: SPIRITUAL SERVICES | Age: 68
End: 2023-09-15

## 2023-09-15 NOTE — ACP (ADVANCE CARE PLANNING)
Advance Care Planning   Ambulatory ACP Specialist Patient Outreach    Date:  9/15/2023    ACP Specialist:  LUNA Menezes    Outreach call to patient in follow-up to ACP Specialist referral from:SATISH Magana CNP    [x] PCP  [x] Provider   [] Ambulatory Care Management [] Other     For:                  [x] Advance Directive Assistance              [] Complete Portable DNR order              [] Complete POST/POLST/MOST              [] Code Status Discussion             [] Discuss Goals of Care             [] Early ACP Decision-Making              [x] Other (Specify): advance care planning    Date Referral Received: 9/14/2023    Next Step:   [] ACP scheduled conversation  [x] Outreach again in one week               [] Email / Mail [address]  [] Email / Mail Advance Directive   [] Closing referral.  Routing closure to referring provider/staff and to ACP Specialist . [] Closure letter mailed to patient with invitation to contact ACP Specialist if / when ready. [] Other (Specify here):         [x] At this time, Healthcare Decision Maker Is:  Advance Care Planning   Healthcare Decision Maker:    Primary Decision Maker: Angelicklaudia Oconnor [facility] - [phone]    Click here to complete Healthcare Decision Makers including selection of the Healthcare Decision Maker Relationship (ie \"Primary\"). [] Primary agent named in scanned advance directive. [x] Legal Next of Kin. [] Unable to determine legal decision maker at this time. Outreaches:       [x] 1st -  Date:  9/15/2023               Intervention:  [] Spoke with Patient   [x] Left Voice mail [] Email / Mail    [] Force-Ajose rafael  [] Other [phone] : Outcomes: This Coordinator attempted to reach the patient offering an ACP conversation, but the patient was not reached, and a voice message has been left at the listed phone number. The HCDM follows the NOK. A follow-up will be made within a week.

## 2023-09-20 ENCOUNTER — HOSPITAL ENCOUNTER (EMERGENCY)
Age: 68
Discharge: HOME | End: 2023-09-20
Attending: EMERGENCY MEDICINE
Payer: MEDICARE

## 2023-09-20 ENCOUNTER — APPOINTMENT (OUTPATIENT)
Dept: GENERAL RADIOLOGY | Age: 68
End: 2023-09-20
Payer: MEDICARE

## 2023-09-20 VITALS
WEIGHT: 186 LBS | HEIGHT: 70 IN | BODY MASS INDEX: 26.63 KG/M2 | SYSTOLIC BLOOD PRESSURE: 161 MMHG | DIASTOLIC BLOOD PRESSURE: 95 MMHG | TEMPERATURE: 98.3 F | OXYGEN SATURATION: 99 % | RESPIRATION RATE: 15 BRPM | HEART RATE: 68 BPM

## 2023-09-20 DIAGNOSIS — I10 ESSENTIAL HYPERTENSION: ICD-10-CM

## 2023-09-20 DIAGNOSIS — R07.89 ATYPICAL CHEST PAIN: Primary | ICD-10-CM

## 2023-09-20 LAB
ALBUMIN SERPL-MCNC: 4.4 G/DL (ref 3.4–5)
ALBUMIN/GLOB SERPL: 1.6 {RATIO} (ref 1.1–2.2)
ALP SERPL-CCNC: 63 U/L (ref 40–129)
ALT SERPL-CCNC: 76 U/L (ref 10–40)
ANION GAP SERPL CALCULATED.3IONS-SCNC: 12 MMOL/L (ref 3–16)
AST SERPL-CCNC: 79 U/L (ref 15–37)
BASOPHILS # BLD: 0 K/UL (ref 0–0.2)
BASOPHILS NFR BLD: 0.9 %
BILIRUB SERPL-MCNC: 0.5 MG/DL (ref 0–1)
BUN SERPL-MCNC: 10 MG/DL (ref 7–20)
CALCIUM SERPL-MCNC: 9.2 MG/DL (ref 8.3–10.6)
CHLORIDE SERPL-SCNC: 95 MMOL/L (ref 99–110)
CO2 SERPL-SCNC: 24 MMOL/L (ref 21–32)
CREAT SERPL-MCNC: 0.7 MG/DL (ref 0.8–1.3)
DEPRECATED RDW RBC AUTO: 13.2 % (ref 12.4–15.4)
EKG ATRIAL RATE: 70 BPM
EKG DIAGNOSIS: NORMAL
EKG P AXIS: 28 DEGREES
EKG P-R INTERVAL: 156 MS
EKG Q-T INTERVAL: 416 MS
EKG QRS DURATION: 86 MS
EKG QTC CALCULATION (BAZETT): 449 MS
EKG R AXIS: 68 DEGREES
EKG T AXIS: 69 DEGREES
EKG VENTRICULAR RATE: 70 BPM
EOSINOPHIL # BLD: 0 K/UL (ref 0–0.6)
EOSINOPHIL NFR BLD: 0.4 %
GFR SERPLBLD CREATININE-BSD FMLA CKD-EPI: >60 ML/MIN/{1.73_M2}
GLUCOSE SERPL-MCNC: 109 MG/DL (ref 70–99)
HCT VFR BLD AUTO: 37.2 % (ref 40.5–52.5)
HGB BLD-MCNC: 12.9 G/DL (ref 13.5–17.5)
INR PPP: 0.92 (ref 0.84–1.16)
LYMPHOCYTES # BLD: 0.7 K/UL (ref 1–5.1)
LYMPHOCYTES NFR BLD: 19.9 %
MCH RBC QN AUTO: 36 PG (ref 26–34)
MCHC RBC AUTO-ENTMCNC: 34.8 G/DL (ref 31–36)
MCV RBC AUTO: 103.7 FL (ref 80–100)
MONOCYTES # BLD: 0.5 K/UL (ref 0–1.3)
MONOCYTES NFR BLD: 13.2 %
NEUTROPHILS # BLD: 2.3 K/UL (ref 1.7–7.7)
NEUTROPHILS NFR BLD: 65.6 %
PLATELET # BLD AUTO: 210 K/UL (ref 135–450)
PMV BLD AUTO: 7.4 FL (ref 5–10.5)
POTASSIUM SERPL-SCNC: 3.8 MMOL/L (ref 3.5–5.1)
PROT SERPL-MCNC: 7.1 G/DL (ref 6.4–8.2)
PROTHROMBIN TIME: 12.3 SEC (ref 11.5–14.8)
RBC # BLD AUTO: 3.59 M/UL (ref 4.2–5.9)
SODIUM SERPL-SCNC: 131 MMOL/L (ref 136–145)
TROPONIN, HIGH SENSITIVITY: <6 NG/L (ref 0–22)
TROPONIN, HIGH SENSITIVITY: <6 NG/L (ref 0–22)
WBC # BLD AUTO: 3.5 K/UL (ref 4–11)

## 2023-09-20 PROCEDURE — 93005 ELECTROCARDIOGRAM TRACING: CPT | Performed by: EMERGENCY MEDICINE

## 2023-09-20 PROCEDURE — 99285 EMERGENCY DEPT VISIT HI MDM: CPT

## 2023-09-20 PROCEDURE — 85025 COMPLETE CBC W/AUTO DIFF WBC: CPT

## 2023-09-20 PROCEDURE — 71045 X-RAY EXAM CHEST 1 VIEW: CPT

## 2023-09-20 PROCEDURE — 93010 ELECTROCARDIOGRAM REPORT: CPT | Performed by: INTERNAL MEDICINE

## 2023-09-20 PROCEDURE — 80053 COMPREHEN METABOLIC PANEL: CPT

## 2023-09-20 PROCEDURE — 36415 COLL VENOUS BLD VENIPUNCTURE: CPT

## 2023-09-20 PROCEDURE — 85610 PROTHROMBIN TIME: CPT

## 2023-09-20 PROCEDURE — 84484 ASSAY OF TROPONIN QUANT: CPT

## 2023-09-20 ASSESSMENT — PAIN - FUNCTIONAL ASSESSMENT
PAIN_FUNCTIONAL_ASSESSMENT: 0-10
PAIN_FUNCTIONAL_ASSESSMENT: ACTIVITIES ARE NOT PREVENTED

## 2023-09-20 ASSESSMENT — PAIN DESCRIPTION - DESCRIPTORS: DESCRIPTORS: DISCOMFORT

## 2023-09-20 ASSESSMENT — HEART SCORE: ECG: 0

## 2023-09-20 ASSESSMENT — LIFESTYLE VARIABLES
HOW MANY STANDARD DRINKS CONTAINING ALCOHOL DO YOU HAVE ON A TYPICAL DAY: 3 OR 4
HOW OFTEN DO YOU HAVE A DRINK CONTAINING ALCOHOL: 2-3 TIMES A WEEK

## 2023-09-20 ASSESSMENT — PAIN DESCRIPTION - PAIN TYPE: TYPE: ACUTE PAIN

## 2023-09-20 ASSESSMENT — PAIN SCALES - GENERAL: PAINLEVEL_OUTOF10: 1

## 2023-09-20 ASSESSMENT — PAIN DESCRIPTION - LOCATION: LOCATION: CHEST

## 2023-09-20 ASSESSMENT — PAIN DESCRIPTION - ORIENTATION: ORIENTATION: MID

## 2023-09-20 ASSESSMENT — PAIN DESCRIPTION - FREQUENCY: FREQUENCY: CONTINUOUS

## 2023-09-20 ASSESSMENT — ENCOUNTER SYMPTOMS: ABDOMINAL PAIN: 0

## 2023-09-20 NOTE — DISCHARGE INSTRUCTIONS
Break the  metopropolol in 2 and take one half at night and take one half in the morning  Follow-up with Dr. Sherryle Nevin in approximately 2 weeks for blood pressure checks and/or follow-up with Dr. Melanie Mir

## 2023-09-20 NOTE — ED NOTES
Discharge instructions and medications reviewed with patient. Patient verbalized understanding of medications and follow up. All questions answered at this time. IV removed, dressing applied, and bleeding controlled. Skin warm, pink, and dry. Patient alert and oriented x4. Pt ambulated to lobby with a stable gait. Patient discharged home with 0 prescriptions.      Thomas Simmons RN  09/20/23 [id number]

## 2023-09-20 NOTE — ED PROVIDER NOTES
[address] [facility]      Pt Name: Leelee Castillo  MRN: [id number]  [facility] 1955  Date of evaluation: 9/20/2023  Provider: Jamal Gonzalez MD    [address]       Chief Complaint   Patient presents with    Chest Pain     Mid sternal chest pain x 5 hours, reports has hx of angina but feels this was different. Took 81 mg of baby Asprin this morning along with his other medications. HISTORY OF PRESENT ILLNESS   (Location/Symptom, Timing/Onset, Context/Setting, Quality, Duration, Modifying Factors, Severity)  Note limiting factors. Leelee Castillo is a 79 y.o. male who presents to the emergency department     Patient presented to the emergency department history of concerned about a blood pressure. Patient does have a history of some rheumatoid arthritis enlarged prostate. History of hypertension which has been kind of ongoing he does take lisinopril in the morning and Norvasc and meta propyl all in the evening has had a history of some liver problems. He did have a stress test in 2018 by Dr. Priyank Torres which was normal in 2004 he did have a left heart cath which was normal.  Recently about 3 weeks ago he had his blood pressure medication changed to nightly doses because he was dangerously dropping his blood pressure too low of a number    The history is provided by the patient. Nursing Notes were reviewed. REVIEW OF SYSTEMS    (2-9 systems for level 4, 10 or more for level 5)     Review of Systems   Constitutional:  Negative for activity change. HENT:  Negative for congestion. Eyes:  Negative for visual disturbance. Gastrointestinal:  Negative for abdominal pain. Genitourinary:  Negative for difficulty urinating. All other systems reviewed and are negative. Except as noted above the remainder of the review of systems was reviewed and negative.        PAST MEDICAL HISTORY     Past Medical History:   Diagnosis Date    Acid reflux

## 2023-09-26 ENCOUNTER — OFFICE VISIT (OUTPATIENT)
Dept: FAMILY MEDICINE CLINIC | Age: 68
End: 2023-09-26
Payer: MEDICARE

## 2023-09-26 VITALS
WEIGHT: 188.2 LBS | BODY MASS INDEX: 27 KG/M2 | HEART RATE: 81 BPM | DIASTOLIC BLOOD PRESSURE: 61 MMHG | OXYGEN SATURATION: 95 % | TEMPERATURE: 98.1 F | SYSTOLIC BLOOD PRESSURE: 94 MMHG

## 2023-09-26 DIAGNOSIS — I10 ESSENTIAL HYPERTENSION: Primary | ICD-10-CM

## 2023-09-26 PROCEDURE — 3078F DIAST BP <80 MM HG: CPT | Performed by: NURSE PRACTITIONER

## 2023-09-26 PROCEDURE — 99214 OFFICE O/P EST MOD 30 MIN: CPT | Performed by: NURSE PRACTITIONER

## 2023-09-26 PROCEDURE — 1123F ACP DISCUSS/DSCN MKR DOCD: CPT | Performed by: NURSE PRACTITIONER

## 2023-09-26 PROCEDURE — 3074F SYST BP LT 130 MM HG: CPT | Performed by: NURSE PRACTITIONER

## 2023-09-26 RX ORDER — AMLODIPINE BESYLATE 5 MG/1
5 TABLET ORAL DAILY
Qty: 90 TABLET | Refills: 3
Start: 2023-09-26

## 2023-09-26 RX ORDER — LISINOPRIL 30 MG/1
15 TABLET ORAL 2 TIMES DAILY
Qty: 90 TABLET | Refills: 1
Start: 2023-09-26

## 2023-09-26 ASSESSMENT — ENCOUNTER SYMPTOMS
ALLERGIC/IMMUNOLOGIC NEGATIVE: 1
EYES NEGATIVE: 1
RESPIRATORY NEGATIVE: 1
SHORTNESS OF BREATH: 0
ABDOMINAL PAIN: 0
CHEST TIGHTNESS: 0

## 2023-09-26 NOTE — PATIENT INSTRUCTIONS
Please read the healthy family handout that you were given and share it with your family. Please compare this printed medication list with your medications at home to be sure they are the same. If you have any medications that are different please contact us immediately at [phone]. Also review your allergies that we have listed, these may also include medications that you have not been able to tolerate, make sure everything listed is correct. If you have any allergies that are different please contact us immediately at [phone]. You may receive a survey in the mail or by email asking about your experience during your visit today. Please complete and return to us so we know how we are serving you.

## 2023-10-24 ENCOUNTER — OFFICE VISIT (OUTPATIENT)
Dept: CARDIOLOGY CLINIC | Age: 68
End: 2023-10-24
Payer: MEDICARE

## 2023-10-24 VITALS
HEART RATE: 71 BPM | SYSTOLIC BLOOD PRESSURE: 152 MMHG | HEIGHT: 70 IN | WEIGHT: 190 LBS | BODY MASS INDEX: 27.2 KG/M2 | OXYGEN SATURATION: 97 % | DIASTOLIC BLOOD PRESSURE: 82 MMHG

## 2023-10-24 DIAGNOSIS — I47.10 SVT (SUPRAVENTRICULAR TACHYCARDIA): Primary | ICD-10-CM

## 2023-10-24 DIAGNOSIS — I10 ESSENTIAL HYPERTENSION: ICD-10-CM

## 2023-10-24 PROCEDURE — 1123F ACP DISCUSS/DSCN MKR DOCD: CPT | Performed by: NURSE PRACTITIONER

## 2023-10-24 PROCEDURE — 99214 OFFICE O/P EST MOD 30 MIN: CPT | Performed by: NURSE PRACTITIONER

## 2023-10-24 PROCEDURE — 3077F SYST BP >= 140 MM HG: CPT | Performed by: NURSE PRACTITIONER

## 2023-10-24 PROCEDURE — 3079F DIAST BP 80-89 MM HG: CPT | Performed by: NURSE PRACTITIONER

## 2023-10-24 RX ORDER — AMLODIPINE BESYLATE 5 MG/1
5 TABLET ORAL 2 TIMES DAILY
Qty: 180 TABLET | Refills: 3 | Status: SHIPPED | OUTPATIENT
Start: 2023-10-24

## 2023-10-24 ASSESSMENT — ENCOUNTER SYMPTOMS
GASTROINTESTINAL NEGATIVE: 1
SHORTNESS OF BREATH: 1

## 2023-10-24 NOTE — PATIENT INSTRUCTIONS
Increase amlodipine to 5 mg twice a day  Increase water intake while decreasing alcohol  Move slowly when changing positions  Compression stockings, small frequent meals throughout the day  Follow up with Dr. Marcel Richards in 3 months

## 2024-01-23 ENCOUNTER — OFFICE VISIT (OUTPATIENT)
Dept: FAMILY MEDICINE CLINIC | Age: 69
End: 2024-01-23
Payer: MEDICARE

## 2024-01-23 VITALS
DIASTOLIC BLOOD PRESSURE: 64 MMHG | HEART RATE: 81 BPM | SYSTOLIC BLOOD PRESSURE: 95 MMHG | OXYGEN SATURATION: 93 % | TEMPERATURE: 98.1 F | BODY MASS INDEX: 26.46 KG/M2 | WEIGHT: 184.4 LBS

## 2024-01-23 DIAGNOSIS — J98.8 RESPIRATORY INFECTION: ICD-10-CM

## 2024-01-23 DIAGNOSIS — N40.0 ENLARGED PROSTATE: ICD-10-CM

## 2024-01-23 DIAGNOSIS — D64.9 ANEMIA, UNSPECIFIED TYPE: Primary | ICD-10-CM

## 2024-01-23 DIAGNOSIS — K70.9 CHRONIC LIVER DISEASE DUE TO ALCOHOL (HCC): ICD-10-CM

## 2024-01-23 DIAGNOSIS — F33.2 SEVERE EPISODE OF RECURRENT MAJOR DEPRESSIVE DISORDER, WITHOUT PSYCHOTIC FEATURES (HCC): ICD-10-CM

## 2024-01-23 DIAGNOSIS — Z23 NEED FOR INFLUENZA VACCINATION: ICD-10-CM

## 2024-01-23 DIAGNOSIS — I10 PRIMARY HYPERTENSION: ICD-10-CM

## 2024-01-23 DIAGNOSIS — E78.00 PURE HYPERCHOLESTEROLEMIA: ICD-10-CM

## 2024-01-23 DIAGNOSIS — R11.2 NAUSEA AND VOMITING, UNSPECIFIED VOMITING TYPE: ICD-10-CM

## 2024-01-23 DIAGNOSIS — M06.9 RHEUMATOID ARTHRITIS INVOLVING MULTIPLE SITES, UNSPECIFIED WHETHER RHEUMATOID FACTOR PRESENT (HCC): ICD-10-CM

## 2024-01-23 LAB
ALBUMIN SERPL-MCNC: 4.4 G/DL (ref 3.4–5)
ALBUMIN/GLOB SERPL: 1.7 {RATIO} (ref 1.1–2.2)
ALP SERPL-CCNC: 86 U/L (ref 40–129)
ALT SERPL-CCNC: 143 U/L (ref 10–40)
ANION GAP SERPL CALCULATED.3IONS-SCNC: 12 MMOL/L (ref 3–16)
AST SERPL-CCNC: 162 U/L (ref 15–37)
BASOPHILS # BLD: 0 K/UL (ref 0–0.2)
BASOPHILS NFR BLD: 0.4 %
BILIRUB SERPL-MCNC: 0.6 MG/DL (ref 0–1)
BUN SERPL-MCNC: 11 MG/DL (ref 7–20)
CALCIUM SERPL-MCNC: 9.1 MG/DL (ref 8.3–10.6)
CHLORIDE SERPL-SCNC: 97 MMOL/L (ref 99–110)
CO2 SERPL-SCNC: 22 MMOL/L (ref 21–32)
CREAT SERPL-MCNC: 0.7 MG/DL (ref 0.8–1.3)
DEPRECATED RDW RBC AUTO: 13 % (ref 12.4–15.4)
EOSINOPHIL # BLD: 0 K/UL (ref 0–0.6)
EOSINOPHIL NFR BLD: 0.3 %
FERRITIN SERPL IA-MCNC: 642 NG/ML (ref 30–400)
FOLATE SERPL-MCNC: 13.58 NG/ML (ref 4.78–24.2)
GFR SERPLBLD CREATININE-BSD FMLA CKD-EPI: >60 ML/MIN/{1.73_M2}
GLUCOSE SERPL-MCNC: 89 MG/DL (ref 70–99)
HCT VFR BLD AUTO: 39.5 % (ref 40.5–52.5)
HGB BLD-MCNC: 13.7 G/DL (ref 13.5–17.5)
IRON SATN MFR SERPL: 36 % (ref 20–50)
IRON SERPL-MCNC: 121 UG/DL (ref 59–158)
LYMPHOCYTES # BLD: 0.5 K/UL (ref 1–5.1)
LYMPHOCYTES NFR BLD: 11 %
MCH RBC QN AUTO: 35.1 PG (ref 26–34)
MCHC RBC AUTO-ENTMCNC: 34.6 G/DL (ref 31–36)
MCV RBC AUTO: 101.4 FL (ref 80–100)
MONOCYTES # BLD: 0.6 K/UL (ref 0–1.3)
MONOCYTES NFR BLD: 14.6 %
NEUTROPHILS # BLD: 3 K/UL (ref 1.7–7.7)
NEUTROPHILS NFR BLD: 73.7 %
PLATELET # BLD AUTO: 234 K/UL (ref 135–450)
PMV BLD AUTO: 8 FL (ref 5–10.5)
POTASSIUM SERPL-SCNC: 4.6 MMOL/L (ref 3.5–5.1)
PROT SERPL-MCNC: 7 G/DL (ref 6.4–8.2)
RBC # BLD AUTO: 3.89 M/UL (ref 4.2–5.9)
SODIUM SERPL-SCNC: 131 MMOL/L (ref 136–145)
TIBC SERPL-MCNC: 335 UG/DL (ref 260–445)
VIT B12 SERPL-MCNC: 1832 PG/ML (ref 211–911)
WBC # BLD AUTO: 4.1 K/UL (ref 4–11)

## 2024-01-23 PROCEDURE — 36415 COLL VENOUS BLD VENIPUNCTURE: CPT | Performed by: NURSE PRACTITIONER

## 2024-01-23 PROCEDURE — 3074F SYST BP LT 130 MM HG: CPT | Performed by: NURSE PRACTITIONER

## 2024-01-23 PROCEDURE — 99214 OFFICE O/P EST MOD 30 MIN: CPT | Performed by: NURSE PRACTITIONER

## 2024-01-23 PROCEDURE — 90694 VACC AIIV4 NO PRSRV 0.5ML IM: CPT | Performed by: NURSE PRACTITIONER

## 2024-01-23 PROCEDURE — G0008 ADMIN INFLUENZA VIRUS VAC: HCPCS | Performed by: NURSE PRACTITIONER

## 2024-01-23 PROCEDURE — 3078F DIAST BP <80 MM HG: CPT | Performed by: NURSE PRACTITIONER

## 2024-01-23 PROCEDURE — 1123F ACP DISCUSS/DSCN MKR DOCD: CPT | Performed by: NURSE PRACTITIONER

## 2024-01-23 RX ORDER — PREDNISONE 20 MG/1
20 TABLET ORAL DAILY
Qty: 7 TABLET | Refills: 0 | Status: SHIPPED | OUTPATIENT
Start: 2024-01-23 | End: 2024-01-23

## 2024-01-23 RX ORDER — OMEPRAZOLE 20 MG/1
20 CAPSULE, DELAYED RELEASE ORAL DAILY
Qty: 90 CAPSULE | Refills: 3 | Status: SHIPPED | OUTPATIENT
Start: 2024-01-23

## 2024-01-23 RX ORDER — TAMSULOSIN HYDROCHLORIDE 0.4 MG/1
0.4 CAPSULE ORAL NIGHTLY
Qty: 90 CAPSULE | Refills: 3 | Status: SHIPPED | OUTPATIENT
Start: 2024-01-23 | End: 2025-01-17

## 2024-01-23 RX ORDER — SULINDAC 150 MG/1
TABLET ORAL
Qty: 180 TABLET | Refills: 3 | Status: SHIPPED | OUTPATIENT
Start: 2024-01-23

## 2024-01-23 RX ORDER — BUPROPION HYDROCHLORIDE 300 MG/1
300 TABLET ORAL EVERY MORNING
Qty: 90 TABLET | Refills: 3 | Status: SHIPPED | OUTPATIENT
Start: 2024-01-23

## 2024-01-23 RX ORDER — SIMVASTATIN 20 MG
20 TABLET ORAL NIGHTLY
Qty: 90 TABLET | Refills: 3 | Status: SHIPPED | OUTPATIENT
Start: 2024-01-23

## 2024-01-23 RX ORDER — ONDANSETRON 4 MG/1
4 TABLET, FILM COATED ORAL 3 TIMES DAILY PRN
Qty: 30 TABLET | Refills: 0 | Status: SHIPPED | OUTPATIENT
Start: 2024-01-23

## 2024-01-23 RX ORDER — LISINOPRIL 30 MG/1
15 TABLET ORAL 2 TIMES DAILY
Qty: 90 TABLET | Refills: 3 | Status: SHIPPED | OUTPATIENT
Start: 2024-01-23

## 2024-01-23 RX ORDER — DOXYCYCLINE HYCLATE 100 MG
100 TABLET ORAL 2 TIMES DAILY
Qty: 20 TABLET | Refills: 0 | Status: SHIPPED | OUTPATIENT
Start: 2024-01-23 | End: 2024-01-23

## 2024-01-23 RX ORDER — FINASTERIDE 5 MG/1
5 TABLET, FILM COATED ORAL DAILY
Qty: 90 TABLET | Refills: 3 | Status: SHIPPED | OUTPATIENT
Start: 2024-01-23

## 2024-01-23 RX ORDER — TERAZOSIN 5 MG/1
CAPSULE ORAL
Qty: 90 CAPSULE | Refills: 3 | Status: SHIPPED | OUTPATIENT
Start: 2024-01-23 | End: 2024-01-23 | Stop reason: ALTCHOICE

## 2024-01-23 RX ORDER — POTASSIUM CHLORIDE 750 MG/1
10 TABLET, FILM COATED, EXTENDED RELEASE ORAL DAILY
Qty: 90 TABLET | Refills: 3 | Status: SHIPPED | OUTPATIENT
Start: 2024-01-23

## 2024-01-23 RX ORDER — FLUOXETINE HYDROCHLORIDE 40 MG/1
CAPSULE ORAL
Qty: 180 CAPSULE | Refills: 3 | Status: SHIPPED | OUTPATIENT
Start: 2024-01-23

## 2024-01-23 RX ORDER — DOXYCYCLINE HYCLATE 100 MG
100 TABLET ORAL 2 TIMES DAILY
Qty: 20 TABLET | Refills: 0 | Status: SHIPPED | OUTPATIENT
Start: 2024-01-23 | End: 2024-02-02

## 2024-01-23 RX ORDER — METOPROLOL SUCCINATE 25 MG/1
25 TABLET, EXTENDED RELEASE ORAL NIGHTLY
Qty: 90 TABLET | Refills: 3 | Status: SHIPPED | OUTPATIENT
Start: 2024-01-23

## 2024-01-23 RX ORDER — ONDANSETRON 4 MG/1
4 TABLET, FILM COATED ORAL 3 TIMES DAILY PRN
Qty: 30 TABLET | Refills: 0 | Status: SHIPPED | OUTPATIENT
Start: 2024-01-23 | End: 2024-01-23

## 2024-01-23 RX ORDER — PREDNISONE 20 MG/1
20 TABLET ORAL DAILY
Qty: 7 TABLET | Refills: 0 | Status: SHIPPED | OUTPATIENT
Start: 2024-01-23 | End: 2024-01-30

## 2024-01-23 RX ORDER — AMLODIPINE BESYLATE 5 MG/1
5 TABLET ORAL 2 TIMES DAILY
Qty: 180 TABLET | Refills: 3 | Status: SHIPPED | OUTPATIENT
Start: 2024-01-23

## 2024-01-23 ASSESSMENT — PATIENT HEALTH QUESTIONNAIRE - PHQ9
9. THOUGHTS THAT YOU WOULD BE BETTER OFF DEAD, OR OF HURTING YOURSELF: 1
SUM OF ALL RESPONSES TO PHQ QUESTIONS 1-9: 13
8. MOVING OR SPEAKING SO SLOWLY THAT OTHER PEOPLE COULD HAVE NOTICED. OR THE OPPOSITE, BEING SO FIGETY OR RESTLESS THAT YOU HAVE BEEN MOVING AROUND A LOT MORE THAN USUAL: 0
SUM OF ALL RESPONSES TO PHQ QUESTIONS 1-9: 12
3. TROUBLE FALLING OR STAYING ASLEEP: 1
SUM OF ALL RESPONSES TO PHQ QUESTIONS 1-9: 13
SUM OF ALL RESPONSES TO PHQ QUESTIONS 1-9: 13
2. FEELING DOWN, DEPRESSED OR HOPELESS: 2
4. FEELING TIRED OR HAVING LITTLE ENERGY: 3
7. TROUBLE CONCENTRATING ON THINGS, SUCH AS READING THE NEWSPAPER OR WATCHING TELEVISION: 1
SUM OF ALL RESPONSES TO PHQ9 QUESTIONS 1 & 2: 3
10. IF YOU CHECKED OFF ANY PROBLEMS, HOW DIFFICULT HAVE THESE PROBLEMS MADE IT FOR YOU TO DO YOUR WORK, TAKE CARE OF THINGS AT HOME, OR GET ALONG WITH OTHER PEOPLE: 1
5. POOR APPETITE OR OVEREATING: 3
1. LITTLE INTEREST OR PLEASURE IN DOING THINGS: 1
6. FEELING BAD ABOUT YOURSELF - OR THAT YOU ARE A FAILURE OR HAVE LET YOURSELF OR YOUR FAMILY DOWN: 1

## 2024-01-23 ASSESSMENT — ENCOUNTER SYMPTOMS
WHEEZING: 1
CHEST TIGHTNESS: 0
ALLERGIC/IMMUNOLOGIC NEGATIVE: 1
SHORTNESS OF BREATH: 0
EYES NEGATIVE: 1
COUGH: 1
ABDOMINAL PAIN: 0

## 2024-01-23 ASSESSMENT — COLUMBIA-SUICIDE SEVERITY RATING SCALE - C-SSRS
6. HAVE YOU EVER DONE ANYTHING, STARTED TO DO ANYTHING, OR PREPARED TO DO ANYTHING TO END YOUR LIFE?: NO
2. HAVE YOU ACTUALLY HAD ANY THOUGHTS OF KILLING YOURSELF?: NO
1. WITHIN THE PAST MONTH, HAVE YOU WISHED YOU WERE DEAD OR WISHED YOU COULD GO TO SLEEP AND NOT WAKE UP?: YES

## 2024-01-23 NOTE — PATIENT INSTRUCTIONS
Please read the healthy family handout that you were given and share it with your family.       Please compare this printed medication list with your medications at home to be sure they are the same.  If you have any medications that are different please contact us immediately at [phone].     Also review your allergies that we have listed, these may also include medications that you have not been able to tolerate, make sure everything listed is correct. If you have any allergies that are different please contact us immediately at [phone].     You may receive a survey in the mail or by email asking about your experience during your visit today. Please complete and return to us so we know how we are serving you.

## 2024-01-23 NOTE — PROGRESS NOTES
Subjective:      Patient ID: Enoc Mancia is a 68 y.o. male.    Chief Complaint   Patient presents with    Check-Up    Hypertension    Hyperlipidemia    Cough    Congestion        HPI    Patient presents today with complaints of increased fatigue, depression and cough, congestion, nausea and intermittent vomiting over the past 3 weeks.  Patient reports fatigue has been present for several months.    Anemia  Patient presents for evaluation of anemia. Anemia was found by routine CBC. It has been present for a few months. Associated signs & symptoms: fatigue.      Hypertension:  Home blood pressure monitoring: No. Patient denies chest pain, shortness of breath, headache, lightheadedness, blurred vision, peripheral edema, palpitations, and dry cough.  Antihypertensive medication side effects: no medication side effects noted.  Use of agents associated with hypertension: none.                                        Sodium (mmol/L)   Date Value   09/20/2023 131 (L)    BUN (mg/dL)   Date Value   09/20/2023 10    Glucose (mg/dL)   Date Value   09/20/2023 109 (H)      Potassium (mmol/L)   Date Value   09/20/2023 3.8     Potassium reflex Magnesium (mmol/L)   Date Value   08/21/2021 3.9    Creatinine (mg/dL)   Date Value   09/20/2023 0.7 (L)         Hyperlipidemia:  No new myalgias or GI upset on simvastatin (Zocor).     Lab Results   Component Value Date    CHOL 169 07/19/2023    TRIG 54 07/19/2023    HDL 80 (H) 07/19/2023    LDLCALC 78 07/19/2023    LDLDIRECT 99 06/20/2019     Lab Results   Component Value Date    ALT 76 (H) 09/20/2023    AST 79 (H) 09/20/2023          Review of Systems   Constitutional:  Positive for fatigue. Negative for activity change, appetite change, chills and unexpected weight change.   HENT:  Positive for congestion.    Eyes: Negative.    Respiratory:  Positive for cough and wheezing. Negative for chest tightness and shortness of breath.    Cardiovascular: Negative.  Negative for chest pain,

## 2024-02-16 ENCOUNTER — HOSPITAL ENCOUNTER (OUTPATIENT)
Age: 69
Discharge: HOME | End: 2024-02-16
Payer: MEDICARE

## 2024-02-16 ENCOUNTER — HOSPITAL ENCOUNTER (OUTPATIENT)
Dept: GENERAL RADIOLOGY | Age: 69
Discharge: HOME | End: 2024-02-16
Payer: MEDICARE

## 2024-02-16 ENCOUNTER — OFFICE VISIT (OUTPATIENT)
Dept: FAMILY MEDICINE CLINIC | Age: 69
End: 2024-02-16
Payer: MEDICARE

## 2024-02-16 VITALS
TEMPERATURE: 97.6 F | SYSTOLIC BLOOD PRESSURE: 115 MMHG | DIASTOLIC BLOOD PRESSURE: 80 MMHG | HEART RATE: 72 BPM | OXYGEN SATURATION: 92 % | BODY MASS INDEX: 25.83 KG/M2 | WEIGHT: 180 LBS

## 2024-02-16 DIAGNOSIS — K59.00 CONSTIPATION, UNSPECIFIED CONSTIPATION TYPE: Primary | ICD-10-CM

## 2024-02-16 DIAGNOSIS — K59.00 CONSTIPATION, UNSPECIFIED CONSTIPATION TYPE: ICD-10-CM

## 2024-02-16 PROCEDURE — 99213 OFFICE O/P EST LOW 20 MIN: CPT | Performed by: NURSE PRACTITIONER

## 2024-02-16 PROCEDURE — 1123F ACP DISCUSS/DSCN MKR DOCD: CPT | Performed by: NURSE PRACTITIONER

## 2024-02-16 PROCEDURE — 74018 RADEX ABDOMEN 1 VIEW: CPT

## 2024-02-16 PROCEDURE — 3079F DIAST BP 80-89 MM HG: CPT | Performed by: NURSE PRACTITIONER

## 2024-02-16 PROCEDURE — 3074F SYST BP LT 130 MM HG: CPT | Performed by: NURSE PRACTITIONER

## 2024-02-16 NOTE — PATIENT INSTRUCTIONS
Please read the healthy family handout that you were given and share it with your family.       Please compare this printed medication list with your medications at home to be sure they are the same.  If you have any medications that are different please contact us immediately at [phone].     Also review your allergies that we have listed, these may also include medications that you have not been able to tolerate, make sure everything listed is correct. If you have any allergies that are different please contact us immediately at [phone].     You may receive a survey in the mail or by email asking about your experience during your visit today. Please complete and return to us so we know how we are serving you.

## 2024-02-16 NOTE — PROGRESS NOTES
VIEW); Future         The note was completed using Dragon voice recognition transcription. Every effort was made to ensure accuracy; however, inadvertent  transcription errors may be present despite my best efforts to edit errors.    SATISH Marquez - CNP

## 2024-02-20 ENCOUNTER — TELEPHONE (OUTPATIENT)
Dept: FAMILY MEDICINE CLINIC | Age: 69
End: 2024-02-20

## 2024-02-20 ENCOUNTER — HOSPITAL ENCOUNTER (EMERGENCY)
Age: 69
Discharge: HOME | End: 2024-02-20
Payer: MEDICARE

## 2024-02-20 ENCOUNTER — APPOINTMENT (OUTPATIENT)
Dept: CT IMAGING | Age: 69
End: 2024-02-20
Payer: MEDICARE

## 2024-02-20 VITALS
HEIGHT: 70 IN | OXYGEN SATURATION: 97 % | TEMPERATURE: 97.6 F | SYSTOLIC BLOOD PRESSURE: 111 MMHG | RESPIRATION RATE: 18 BRPM | WEIGHT: 179.6 LBS | BODY MASS INDEX: 25.71 KG/M2 | HEART RATE: 74 BPM | DIASTOLIC BLOOD PRESSURE: 72 MMHG

## 2024-02-20 DIAGNOSIS — R79.89 ELEVATED LIVER FUNCTION TESTS: ICD-10-CM

## 2024-02-20 DIAGNOSIS — R10.30 LOWER ABDOMINAL PAIN: Primary | ICD-10-CM

## 2024-02-20 DIAGNOSIS — E87.1 HYPONATREMIA: ICD-10-CM

## 2024-02-20 DIAGNOSIS — R74.8 ELEVATED LIPASE: ICD-10-CM

## 2024-02-20 LAB
ALBUMIN SERPL-MCNC: 4.2 G/DL (ref 3.4–5)
ALBUMIN/GLOB SERPL: 1.4 {RATIO} (ref 1.1–2.2)
ALP SERPL-CCNC: 79 U/L (ref 40–129)
ALT SERPL-CCNC: 189 U/L (ref 10–40)
ANION GAP SERPL CALCULATED.3IONS-SCNC: 8 MMOL/L (ref 3–16)
ANION GAP SERPL CALCULATED.3IONS-SCNC: 9 MMOL/L (ref 3–16)
AST SERPL-CCNC: 145 U/L (ref 15–37)
BACTERIA URNS QL MICRO: ABNORMAL /HPF
BASOPHILS # BLD: 0 K/UL (ref 0–0.2)
BASOPHILS NFR BLD: 0.7 %
BILIRUB SERPL-MCNC: 0.5 MG/DL (ref 0–1)
BILIRUB UR QL STRIP.AUTO: NEGATIVE
BUN SERPL-MCNC: 10 MG/DL (ref 7–20)
BUN SERPL-MCNC: 12 MG/DL (ref 7–20)
CALCIUM SERPL-MCNC: 6.5 MG/DL (ref 8.3–10.6)
CALCIUM SERPL-MCNC: 8.8 MG/DL (ref 8.3–10.6)
CHLORIDE SERPL-SCNC: 103 MMOL/L (ref 99–110)
CHLORIDE SERPL-SCNC: 94 MMOL/L (ref 99–110)
CLARITY UR: CLEAR
CO2 SERPL-SCNC: 18 MMOL/L (ref 21–32)
CO2 SERPL-SCNC: 24 MMOL/L (ref 21–32)
COLOR UR: YELLOW
CREAT SERPL-MCNC: 0.6 MG/DL (ref 0.8–1.3)
CREAT SERPL-MCNC: <0.5 MG/DL (ref 0.8–1.3)
DEPRECATED RDW RBC AUTO: 13.6 % (ref 12.4–15.4)
EOSINOPHIL # BLD: 0 K/UL (ref 0–0.6)
EOSINOPHIL NFR BLD: 0.6 %
EPI CELLS #/AREA URNS HPF: ABNORMAL /HPF (ref 0–5)
FINE GRAN CASTS #/AREA URNS HPF: ABNORMAL /LPF (ref 0–2)
GFR SERPLBLD CREATININE-BSD FMLA CKD-EPI: >60 ML/MIN/{1.73_M2}
GFR SERPLBLD CREATININE-BSD FMLA CKD-EPI: >60 ML/MIN/{1.73_M2}
GLUCOSE SERPL-MCNC: 65 MG/DL (ref 70–99)
GLUCOSE SERPL-MCNC: 95 MG/DL (ref 70–99)
GLUCOSE UR STRIP.AUTO-MCNC: NEGATIVE MG/DL
HCT VFR BLD AUTO: 40.2 % (ref 40.5–52.5)
HGB BLD-MCNC: 13.8 G/DL (ref 13.5–17.5)
HGB UR QL STRIP.AUTO: NEGATIVE
HYALINE CASTS #/AREA URNS LPF: ABNORMAL /LPF (ref 0–2)
KETONES UR STRIP.AUTO-MCNC: NEGATIVE MG/DL
LEUKOCYTE ESTERASE UR QL STRIP.AUTO: ABNORMAL
LIPASE SERPL-CCNC: 200 U/L (ref 13–60)
LYMPHOCYTES # BLD: 0.3 K/UL (ref 1–5.1)
LYMPHOCYTES NFR BLD: 4.7 %
MCH RBC QN AUTO: 35.6 PG (ref 26–34)
MCHC RBC AUTO-ENTMCNC: 34.3 G/DL (ref 31–36)
MCV RBC AUTO: 103.5 FL (ref 80–100)
MONOCYTES # BLD: 0.5 K/UL (ref 0–1.3)
MONOCYTES NFR BLD: 8.9 %
MUCOUS THREADS #/AREA URNS LPF: ABNORMAL /LPF
NEUTROPHILS # BLD: 5 K/UL (ref 1.7–7.7)
NEUTROPHILS NFR BLD: 85.1 %
NITRITE UR QL STRIP.AUTO: NEGATIVE
PH UR STRIP.AUTO: 6.5 [PH] (ref 5–8)
PLATELET # BLD AUTO: 214 K/UL (ref 135–450)
PMV BLD AUTO: 7.5 FL (ref 5–10.5)
POTASSIUM SERPL-SCNC: 4.2 MMOL/L (ref 3.5–5.1)
POTASSIUM SERPL-SCNC: 4.5 MMOL/L (ref 3.5–5.1)
PROT SERPL-MCNC: 7.2 G/DL (ref 6.4–8.2)
PROT UR STRIP.AUTO-MCNC: NEGATIVE MG/DL
RBC # BLD AUTO: 3.88 M/UL (ref 4.2–5.9)
RBC #/AREA URNS HPF: ABNORMAL /HPF (ref 0–4)
SODIUM SERPL-SCNC: 126 MMOL/L (ref 136–145)
SODIUM SERPL-SCNC: 130 MMOL/L (ref 136–145)
SP GR UR STRIP.AUTO: 1.02 (ref 1–1.03)
UA COMPLETE W REFLEX CULTURE PNL UR: YES
UA DIPSTICK W REFLEX MICRO PNL UR: YES
URN SPEC COLLECT METH UR: ABNORMAL
UROBILINOGEN UR STRIP-ACNC: 1 E.U./DL
WBC # BLD AUTO: 5.8 K/UL (ref 4–11)
WBC #/AREA URNS HPF: ABNORMAL /HPF (ref 0–5)

## 2024-02-20 PROCEDURE — 36415 COLL VENOUS BLD VENIPUNCTURE: CPT

## 2024-02-20 PROCEDURE — 6360000004 HC RX CONTRAST MEDICATION: Performed by: NURSE PRACTITIONER

## 2024-02-20 PROCEDURE — 99285 EMERGENCY DEPT VISIT HI MDM: CPT

## 2024-02-20 PROCEDURE — 83690 ASSAY OF LIPASE: CPT

## 2024-02-20 PROCEDURE — 87086 URINE CULTURE/COLONY COUNT: CPT

## 2024-02-20 PROCEDURE — 74177 CT ABD & PELVIS W/CONTRAST: CPT

## 2024-02-20 PROCEDURE — 80053 COMPREHEN METABOLIC PANEL: CPT

## 2024-02-20 PROCEDURE — 81001 URINALYSIS AUTO W/SCOPE: CPT

## 2024-02-20 PROCEDURE — 2580000003 HC RX 258: Performed by: NURSE PRACTITIONER

## 2024-02-20 PROCEDURE — 85025 COMPLETE CBC W/AUTO DIFF WBC: CPT

## 2024-02-20 RX ORDER — 0.9 % SODIUM CHLORIDE 0.9 %
1000 INTRAVENOUS SOLUTION INTRAVENOUS ONCE
Status: COMPLETED | OUTPATIENT
Start: 2024-02-20 | End: 2024-02-20

## 2024-02-20 RX ADMIN — IOPAMIDOL 75 ML: 755 INJECTION, SOLUTION INTRAVENOUS at 10:41

## 2024-02-20 RX ADMIN — SODIUM CHLORIDE 1000 ML: 9 INJECTION, SOLUTION INTRAVENOUS at 12:00

## 2024-02-20 ASSESSMENT — PAIN DESCRIPTION - DESCRIPTORS: DESCRIPTORS: DISCOMFORT

## 2024-02-20 ASSESSMENT — ENCOUNTER SYMPTOMS
COUGH: 0
BACK PAIN: 0
EYE PAIN: 0
CONSTIPATION: 1
SORE THROAT: 0
NAUSEA: 1
ABDOMINAL PAIN: 0
VOMITING: 0
RHINORRHEA: 0
SHORTNESS OF BREATH: 0

## 2024-02-20 ASSESSMENT — PAIN - FUNCTIONAL ASSESSMENT: PAIN_FUNCTIONAL_ASSESSMENT: 0-10

## 2024-02-20 ASSESSMENT — LIFESTYLE VARIABLES
HOW OFTEN DO YOU HAVE A DRINK CONTAINING ALCOHOL: NEVER
HOW MANY STANDARD DRINKS CONTAINING ALCOHOL DO YOU HAVE ON A TYPICAL DAY: PATIENT DOES NOT DRINK

## 2024-02-20 ASSESSMENT — PAIN SCALES - GENERAL: PAINLEVEL_OUTOF10: 4

## 2024-02-20 ASSESSMENT — PAIN DESCRIPTION - LOCATION: LOCATION: ABDOMEN

## 2024-02-20 NOTE — ED NOTES
--Patient provided with discharge instructions and any prescriptions.   --Instructions, dosing, and follow-up appointments reviewed with patient/family. No further questions or needs at this time.   --Vital signs and patient stable upon discharge.  --Patient ambulatory to lobby with family.

## 2024-02-20 NOTE — ED PROVIDER NOTES
[facility] ED  EMERGENCY DEPARTMENT ENCOUNTER        Pt Name: Enoc Mancia  MRN: [id number]  Birthdate 1955  Date of evaluation: 2/20/2024  Provider: SATISH Doss CNP  PCP: Sofia Whitt APRN - CNP  Note Started: 9:37 AM EST 2/20/24      SHELBY. I have evaluated this patient.        CHIEF COMPLAINT       Chief Complaint   Patient presents with    Abdominal Pain     Feels constipated since Friday, no hx of bowel obstruction. Has attempted magnesium at home with no relief.       HISTORY OF PRESENT ILLNESS: 1 or more Elements     History From: Patient     Limitations to history : None    Social Determinants Significantly Affecting Health : None    Chief Complaint: Constipation    Enoc Mancia is a 68 y.o. male who presents to the emergency department today with symptoms of constipation.  Patient has concerns for possible obstruction.  Denies any history of obstruction in the past.  States that his had a colonoscopy in the past.  Has had a appendectomy in the past.  No other surgical history.  No fever no chills.  Reports had some passage of water on Friday through his bowels but since no bowel movements.  Has attempted mag citrate, GoLytely, Dukas 8 without much improvement.    Nursing Notes were all reviewed and agreed with or any disagreements were addressed in the HPI.    REVIEW OF SYSTEMS :      Review of Systems   Constitutional:  Negative for chills, diaphoresis and fever.   HENT:  Negative for congestion, ear pain, rhinorrhea and sore throat.    Eyes:  Negative for pain and visual disturbance.   Respiratory:  Negative for cough and shortness of breath.    Cardiovascular:  Negative for chest pain and leg swelling.   Gastrointestinal:  Positive for constipation and nausea. Negative for abdominal pain and vomiting.   Genitourinary:  Negative for difficulty urinating, dysuria, flank pain and frequency.   Musculoskeletal:  Negative for back pain and neck pain.   Skin:

## 2024-02-21 LAB — BACTERIA UR CULT: NORMAL

## 2024-03-25 ENCOUNTER — TELEPHONE (OUTPATIENT)
Dept: FAMILY MEDICINE CLINIC | Age: 69
End: 2024-03-25

## 2024-03-25 NOTE — TELEPHONE ENCOUNTER
Pt is having issues with his bowels not moving again.    Takes fiber daily.   Takes a laxative prn and not helping.   Took one 2 days ago (OTC).  Should he take the go-lightly.   Last BM was 2 weeks.  Very little appetite.   Drinking OK.  Pls advise.

## 2024-03-28 DIAGNOSIS — I10 PRIMARY HYPERTENSION: ICD-10-CM

## 2024-03-28 RX ORDER — LISINOPRIL 30 MG/1
15 TABLET ORAL 2 TIMES DAILY
Qty: 30 TABLET | Refills: 0 | Status: SHIPPED | OUTPATIENT
Start: 2024-03-28 | End: 2024-04-27

## 2024-03-28 NOTE — PROGRESS NOTES
Pt's Lisinopril has still not arrived from the [employer] order company and was told it could be a while.   Asking for 30days to local.

## 2024-05-01 ENCOUNTER — TRANSCRIBE ORDERS (OUTPATIENT)
Dept: ADMINISTRATIVE | Age: 69
End: 2024-05-01

## 2024-05-01 DIAGNOSIS — R63.4 WEIGHT LOSS: Primary | ICD-10-CM

## 2024-05-02 DIAGNOSIS — I10 PRIMARY HYPERTENSION: ICD-10-CM

## 2024-05-02 RX ORDER — LISINOPRIL 30 MG/1
15 TABLET ORAL 2 TIMES DAILY
Qty: 90 TABLET | Refills: 3 | Status: SHIPPED | OUTPATIENT
Start: 2024-05-02 | End: 2024-05-02 | Stop reason: SDUPTHER

## 2024-05-02 RX ORDER — LISINOPRIL 30 MG/1
15 TABLET ORAL 2 TIMES DAILY
Qty: 30 TABLET | Refills: 0 | Status: SHIPPED | OUTPATIENT
Start: 2024-05-02 | End: 2024-05-02 | Stop reason: SDUPTHER

## 2024-05-02 RX ORDER — LISINOPRIL 30 MG/1
15 TABLET ORAL 2 TIMES DAILY
Qty: 90 TABLET | Refills: 3 | OUTPATIENT
Start: 2024-05-02 | End: 2025-04-27

## 2024-05-02 NOTE — PROGRESS NOTES
Perfect.   I gave them a verbal.     Can you sign the script for me please.  I put it in as a phone in.

## 2024-05-02 NOTE — PROGRESS NOTES
[facility] called.  Hua said that this script was written 2 days.   Once daily and 1/2 bid.   Last filled in March as 1/2 bid, so gave him verbal to change to the 1/2 bid dosing.

## 2024-05-08 ENCOUNTER — HOSPITAL ENCOUNTER (OUTPATIENT)
Dept: CT IMAGING | Age: 69
Discharge: HOME | End: 2024-05-08
Attending: INTERNAL MEDICINE
Payer: MEDICARE

## 2024-05-08 DIAGNOSIS — R63.4 WEIGHT LOSS: ICD-10-CM

## 2024-05-08 LAB
PERFORMED ON: NORMAL
POC CREATININE: 0.9 MG/DL (ref 0.8–1.3)
POC SAMPLE TYPE: NORMAL

## 2024-05-08 PROCEDURE — 82565 ASSAY OF CREATININE: CPT

## 2024-05-08 PROCEDURE — 6360000004 HC RX CONTRAST MEDICATION: Performed by: INTERNAL MEDICINE

## 2024-05-08 PROCEDURE — 74177 CT ABD & PELVIS W/CONTRAST: CPT

## 2024-05-08 RX ADMIN — IOPAMIDOL 75 ML: 755 INJECTION, SOLUTION INTRAVENOUS at 07:48

## 2024-05-08 RX ADMIN — DIATRIZOATE MEGLUMINE AND DIATRIZOATE SODIUM 12 ML: 660; 100 LIQUID ORAL; RECTAL at 07:48

## 2024-05-20 ENCOUNTER — TELEMEDICINE (OUTPATIENT)
Dept: FAMILY MEDICINE CLINIC | Age: 69
End: 2024-05-20
Payer: MEDICARE

## 2024-05-20 DIAGNOSIS — Z00.00 MEDICARE ANNUAL WELLNESS VISIT, SUBSEQUENT: Primary | ICD-10-CM

## 2024-05-20 PROCEDURE — 1123F ACP DISCUSS/DSCN MKR DOCD: CPT | Performed by: NURSE PRACTITIONER

## 2024-05-20 PROCEDURE — G0439 PPPS, SUBSEQ VISIT: HCPCS | Performed by: NURSE PRACTITIONER

## 2024-05-20 ASSESSMENT — LIFESTYLE VARIABLES
HAS A RELATIVE, FRIEND, DOCTOR, OR ANOTHER HEALTH PROFESSIONAL EXPRESSED CONCERN ABOUT YOUR DRINKING OR SUGGESTED YOU CUT DOWN: NO
HOW OFTEN DURING THE LAST YEAR HAVE YOU FAILED TO DO WHAT WAS NORMALLY EXPECTED FROM YOU BECAUSE OF DRINKING: NEVER
HOW OFTEN DURING THE LAST YEAR HAVE YOU HAD A FEELING OF GUILT OR REMORSE AFTER DRINKING: NEVER
HOW OFTEN DURING THE LAST YEAR HAVE YOU FOUND THAT YOU WERE NOT ABLE TO STOP DRINKING ONCE YOU HAD STARTED: NEVER
HOW OFTEN DURING THE LAST YEAR HAVE YOU BEEN UNABLE TO REMEMBER WHAT HAPPENED THE NIGHT BEFORE BECAUSE YOU HAD BEEN DRINKING: NEVER
HOW MANY STANDARD DRINKS CONTAINING ALCOHOL DO YOU HAVE ON A TYPICAL DAY: 3 OR 4
HOW OFTEN DURING THE LAST YEAR HAVE YOU NEEDED AN ALCOHOLIC DRINK FIRST THING IN THE MORNING TO GET YOURSELF GOING AFTER A NIGHT OF HEAVY DRINKING: NEVER
HOW OFTEN DO YOU HAVE A DRINK CONTAINING ALCOHOL: 4 OR MORE TIMES A WEEK
HAVE YOU OR SOMEONE ELSE BEEN INJURED AS A RESULT OF YOUR DRINKING: NO

## 2024-05-20 ASSESSMENT — PATIENT HEALTH QUESTIONNAIRE - PHQ9
9. THOUGHTS THAT YOU WOULD BE BETTER OFF DEAD, OR OF HURTING YOURSELF: NOT AT ALL
SUM OF ALL RESPONSES TO PHQ9 QUESTIONS 1 & 2: 0
SUM OF ALL RESPONSES TO PHQ QUESTIONS 1-9: 0
4. FEELING TIRED OR HAVING LITTLE ENERGY: NOT AT ALL
5. POOR APPETITE OR OVEREATING: NOT AT ALL
SUM OF ALL RESPONSES TO PHQ QUESTIONS 1-9: 0
1. LITTLE INTEREST OR PLEASURE IN DOING THINGS: NOT AT ALL
3. TROUBLE FALLING OR STAYING ASLEEP: NOT AT ALL
2. FEELING DOWN, DEPRESSED OR HOPELESS: NOT AT ALL
8. MOVING OR SPEAKING SO SLOWLY THAT OTHER PEOPLE COULD HAVE NOTICED. OR THE OPPOSITE, BEING SO FIGETY OR RESTLESS THAT YOU HAVE BEEN MOVING AROUND A LOT MORE THAN USUAL: NOT AT ALL
SUM OF ALL RESPONSES TO PHQ QUESTIONS 1-9: 0
6. FEELING BAD ABOUT YOURSELF - OR THAT YOU ARE A FAILURE OR HAVE LET YOURSELF OR YOUR FAMILY DOWN: NOT AT ALL
10. IF YOU CHECKED OFF ANY PROBLEMS, HOW DIFFICULT HAVE THESE PROBLEMS MADE IT FOR YOU TO DO YOUR WORK, TAKE CARE OF THINGS AT HOME, OR GET ALONG WITH OTHER PEOPLE: NOT DIFFICULT AT ALL
7. TROUBLE CONCENTRATING ON THINGS, SUCH AS READING THE NEWSPAPER OR WATCHING TELEVISION: NOT AT ALL
SUM OF ALL RESPONSES TO PHQ QUESTIONS 1-9: 0

## 2024-05-20 NOTE — PATIENT INSTRUCTIONS
9 Ways to Cut Back on Drinking  Maybe you've found yourself drinking more alcohol than you'd prefer. If you want to cut back, here are some ideas to try.    Think before you drink.  Do you really want a drink, or is it just a habit? If you're used to having a drink at a certain time, try doing something else then.     Look for substitutes.  Find some no-alcohol drinks that you enjoy, like flavored seltzer water, tea with honey, or tonic with a slice of lime. Or try alcohol-free beer or \"virgin\" cocktails (without the alcohol).     Drink more water.  Use water to quench your thirst. Drink a glass of water before you have any alcohol. Have another glass along with every drink or between drinks.     Shrink your drink.  For example, have a bottle of beer instead of a pint. Use a smaller glass for wine. Choose drinks with lower alcohol content (ABV%). Or use less liquor and more mixer in cocktails.     Slow down.  It's easy to drink quickly and without thinking about it. Pay attention, and make each drink last longer.     Do the math.  Total up how much you spend on alcohol each month. How much is that a year? If you cut back, what could you do with the money you save?     Take a break.  Choose a day or two each week when you won't drink at all. Notice how you feel on those days, physically and emotionally. How did you sleep? Do you feel better? Over time, add more break days.     Count calories.  Would you like to lose some weight? For some people that's a good motivator for cutting back. Figure out how many calories are in each drink. How many does that add up to in a day? In a week? In a month?     Practice saying no.  Be ready when someone offers you a drink. Try: \"Thanks, I've had enough.\" Or \"Thanks, but I'm cutting back.\" Or \"No, thanks. I feel better when I drink less.\"   Current as of: November 15, 2023               Content Version: 14.0  © 5652-4723 Healthwise, Incorporated.   Care instructions adapted

## 2024-05-20 NOTE — PROGRESS NOTES
f/u if symptoms do not improve or worsen.        Dentist Screen:  Have you seen the dentist within the past year?: (!) No    Intervention:  Patient comments: patient has Dental home that he can go to if needed.         Advanced Directives:  Do you have a Living Will?: (!) No    Intervention:  Patient has information for living will - states I just need to complete it. No further assisted wanted.         Tobacco Use:  Tobacco Use: High Risk (5/20/2024)    Patient History     Smoking Tobacco Use: Former     Smokeless Tobacco Use: Current     Passive Exposure: Not on file     E-cigarette/Vaping       Questions Responses    E-cigarette/Vaping Use Never User    Start Date     Passive Exposure     Quit Date     Counseling Given     Comments           Interventions:  Patient declined any further intervention or treatment          CV Risk Counseling:  Patient was asked about his current diet and exercise habits, and personalized advice was provided regarding recommended lifestyle changes. Patient's individual cardiovascular disease risk factors, including advanced age (> 55 for men, > 65 for women), dyslipidemia, hypertension, male gender, and smoking/tobacco exposure, were discussed, as well as the likely benefits of lifestyle changes. Based upon patient's motivation to change his behavior, the following plan was agreed upon to work toward lowering cardiovascular disease risk: Patient is not ready to make any meaningful lifestyle changes at this time.  Aspirin use for primary prevention of cardiovascular disease for men 45-79 and women 55-79: Indicated- continue daily aspirin. Educational materials for lifestyle changes were provided. Patient will follow-up in           Objective      Patient-Reported Vitals  Patient-Reported Systolic (Top): 148 mmHg  Patient-Reported Diastolic (Bottom): 85 mmHg          Allergies   Allergen Reactions    Codeine      Not sure of reaction    Pcn [Penicillins]      Not sure of reaction

## 2024-05-29 NOTE — PATIENT INSTRUCTIONS
Please read the healthy family handout that you were given and share it with your family. Please compare this printed medication list with your medications at home to be sure they are the same. If you have any medications that are different please contact us immediately at [phone]. Also review your allergies that we have listed, these may also include medications that you have not been able to tolerate, make sure everything listed is correct. If you have any allergies that are different please contact us immediately at [phone]. Patient Education        DASH Diet: Care Instructions  Your Care Instructions     The DASH diet is an eating plan that can help lower your blood pressure. DASH stands for Dietary Approaches to Stop Hypertension. Hypertension is high blood pressure. The DASH diet focuses on eating foods that are high in calcium, potassium, and magnesium. These nutrients can lower blood pressure. The foods that are highest in these nutrients are fruits, vegetables, low-fat dairy products, nuts, seeds, and legumes. But taking calcium, potassium, and magnesium supplements instead of eating foods that are high in those nutrients does not have the same effect. The DASH diet also includes whole grains, fish, and poultry. The DASH diet is one of several lifestyle changes your doctor may recommend to lower your high blood pressure. Your doctor may also want you to decrease the amount of sodium in your diet. Lowering sodium while following the DASH diet can lower blood pressure even further than just the DASH diet alone. Follow-up care is a key part of your treatment and safety. Be sure to make and go to all appointments, and call your doctor if you are having problems. It's also a good idea to know your test results and keep a list of the medicines you take. How can you care for yourself at home? Following the DASH diet  · Eat 4 to 5 servings of fruit each day.  A serving is 1 medium-sized piece Zofran sent to pharmacy please notify patient.   of fruit, ½ cup chopped or canned fruit, 1/4 cup dried fruit, or 4 ounces (½ cup) of fruit juice. Choose fruit more often than fruit juice. · Eat 4 to 5 servings of vegetables each day. A serving is 1 cup of lettuce or raw leafy vegetables, ½ cup of chopped or cooked vegetables, or 4 ounces (½ cup) of vegetable juice. Choose vegetables more often than vegetable juice. · Get 2 to 3 servings of low-fat and fat-free dairy each day. A serving is 8 ounces of milk, 1 cup of yogurt, or 1 ½ ounces of cheese. · Eat 6 to 8 servings of grains each day. A serving is 1 slice of bread, 1 ounce of dry cereal, or ½ cup of cooked rice, pasta, or cooked cereal. Try to choose whole-grain products as much as possible. · Limit lean meat, poultry, and fish to 2 servings each day. A serving is 3 ounces, about the size of a deck of cards. · Eat 4 to 5 servings of nuts, seeds, and legumes (cooked dried beans, lentils, and split peas) each week. A serving is 1/3 cup of nuts, 2 tablespoons of seeds, or ½ cup of cooked beans or peas. · Limit fats and oils to 2 to 3 servings each day. A serving is 1 teaspoon of vegetable oil or 2 tablespoons of salad dressing. · Limit sweets and added sugars to 5 servings or less a week. A serving is 1 tablespoon jelly or jam, ½ cup sorbet, or 1 cup of lemonade. · Eat less than 2,300 milligrams (mg) of sodium a day. If you limit your sodium to 1,500 mg a day, you can lower your blood pressure even more. Tips for success  · Start small. Do not try to make dramatic changes to your diet all at once. You might feel that you are missing out on your favorite foods and then be more likely to not follow the plan. Make small changes, and stick with them. Once those changes become habit, add a few more changes. · Try some of the following:  ? Make it a goal to eat a fruit or vegetable at every meal and at snacks. This will make it easy to get the recommended amount of fruits and vegetables each day.   ? Try yogurt topped with fruit and nuts for a snack or healthy dessert. ? Add lettuce, tomato, cucumber, and onion to sandwiches. ? Combine a ready-made pizza crust with low-fat mozzarella cheese and lots of vegetable toppings. Try using tomatoes, squash, spinach, broccoli, carrots, cauliflower, and onions. ? Have a variety of cut-up vegetables with a low-fat dip as an appetizer instead of chips and dip. ? Sprinkle sunflower seeds or chopped almonds over salads. Or try adding chopped walnuts or almonds to cooked vegetables. ? Try some vegetarian meals using beans and peas. Add garbanzo or kidney beans to salads. Make burritos and tacos with mashed pereira beans or black beans. Where can you learn more? Go to https://PeerSpaceleoChubbies Shorts.Beauteeze.com. org and sign in to your [employer] account. Enter A307 in the [facility] box to learn more about \"DASH Diet: Care Instructions. \"     If you do not have an account, please click on the \"Sign Up Now\" link. Current as of: December 16, 2019               Content Version: 12.5  © 7075-8627 Healthwise, Incorporated. Care instructions adapted under license by [facility] ([facility]). If you have questions about a medical condition or this instruction, always ask your healthcare professional. [address] any warranty or liability for your use of this information.

## 2024-06-04 ENCOUNTER — OFFICE VISIT (OUTPATIENT)
Dept: FAMILY MEDICINE CLINIC | Age: 69
End: 2024-06-04
Payer: MEDICARE

## 2024-06-04 VITALS
DIASTOLIC BLOOD PRESSURE: 68 MMHG | OXYGEN SATURATION: 95 % | WEIGHT: 171 LBS | HEIGHT: 70 IN | BODY MASS INDEX: 24.48 KG/M2 | SYSTOLIC BLOOD PRESSURE: 104 MMHG | HEART RATE: 79 BPM

## 2024-06-04 DIAGNOSIS — E87.1 LOW SODIUM LEVELS: ICD-10-CM

## 2024-06-04 DIAGNOSIS — R53.83 FATIGUE, UNSPECIFIED TYPE: ICD-10-CM

## 2024-06-04 DIAGNOSIS — I95.1 ORTHOSTATIC HYPOTENSION: Primary | ICD-10-CM

## 2024-06-04 DIAGNOSIS — R56.9 WITNESSED SEIZURE-LIKE ACTIVITY (HCC): ICD-10-CM

## 2024-06-04 PROCEDURE — 1123F ACP DISCUSS/DSCN MKR DOCD: CPT | Performed by: NURSE PRACTITIONER

## 2024-06-04 PROCEDURE — 99214 OFFICE O/P EST MOD 30 MIN: CPT | Performed by: NURSE PRACTITIONER

## 2024-06-04 PROCEDURE — 36415 COLL VENOUS BLD VENIPUNCTURE: CPT | Performed by: NURSE PRACTITIONER

## 2024-06-04 PROCEDURE — 3078F DIAST BP <80 MM HG: CPT | Performed by: NURSE PRACTITIONER

## 2024-06-04 PROCEDURE — 3074F SYST BP LT 130 MM HG: CPT | Performed by: NURSE PRACTITIONER

## 2024-06-04 SDOH — ECONOMIC STABILITY: HOUSING INSECURITY
IN THE LAST 12 MONTHS, WAS THERE A TIME WHEN YOU DID NOT HAVE A STEADY PLACE TO SLEEP OR SLEPT IN A SHELTER (INCLUDING NOW)?: NO

## 2024-06-04 SDOH — ECONOMIC STABILITY: FOOD INSECURITY: WITHIN THE PAST 12 MONTHS, THE FOOD YOU BOUGHT JUST DIDN'T LAST AND YOU DIDN'T HAVE MONEY TO GET MORE.: NEVER TRUE

## 2024-06-04 SDOH — ECONOMIC STABILITY: INCOME INSECURITY: HOW HARD IS IT FOR YOU TO PAY FOR THE VERY BASICS LIKE FOOD, HOUSING, MEDICAL CARE, AND HEATING?: NOT HARD AT ALL

## 2024-06-04 SDOH — ECONOMIC STABILITY: FOOD INSECURITY: WITHIN THE PAST 12 MONTHS, YOU WORRIED THAT YOUR FOOD WOULD RUN OUT BEFORE YOU GOT MONEY TO BUY MORE.: NEVER TRUE

## 2024-06-04 ASSESSMENT — ENCOUNTER SYMPTOMS
RESPIRATORY NEGATIVE: 1
SHORTNESS OF BREATH: 0
ABDOMINAL PAIN: 0
CHEST TIGHTNESS: 0
EYES NEGATIVE: 1
ALLERGIC/IMMUNOLOGIC NEGATIVE: 1

## 2024-06-04 NOTE — PROGRESS NOTES
Subjective:      Patient ID: Enoc Mancia is a 69 y.o. male.    Chief Complaint   Patient presents with    Other     ER Follow up for [facility]    Patient presents today to follow-up on recent ED visit.  Patient was in the ED on 5/30/2024 following a syncopal episode.  Patient states he woke up from a nap, had went out to the garage and became dizzy and woke up not realizing what happened (syncope and collapse).  Patient reports he lost consciousness for less than a minute.  Patient denies any prior injury or injury as a result of collapse.  Patient has a prior similar episodes and was previously diagnosed with orthostatic hypotension.  Patient reports the episodes are become more frequent and reports extreme fatigue. Patient reports some the episodes have been associated with jerking movement on her arms and legs. Wife states, \"I think he may be having seizures\". Patient reports remote head injury (approximately 30 years ago) however no recent head injury or any other injuries. Reviewed ED visit notes and associated testing. Sodium is low at 128 and liver enzymes were elevated.  Troponin was negative.  CT of the brain without contrast and chest x-ray showed no acute abnormalities.  Patient was treated with IV fluids and discharged home in stable condition.     Today, patient reports he continues to have extreme fatigue and recurrent episodes of dizziness however not further episodes of syncope & collapse since ED visit on 5/30/2024.  Patient also reports weight loss over the past 1 year however states this has been related to decreased appetite/intake.  Patient also uses marijuana on a regular basis and has a long history of alcohol abuse.  Patient reports he has reduced alcohol intake over the past 1 week.      Review of Systems   Constitutional:  Positive for appetite change (poor appetite) and fatigue. Negative for activity change, chills and unexpected weight change.   HENT: Negative.    39 y/o female with PMHx of admitted to hospital on 4/5/23 and underwent umbilical hernia repair with panniculectomy and umbilectomy on 4/5/23. Patient tolerated procedure well and progressed appropriately. Currently tolerating regular diet, voiding independently, having bowel movements and ambulating. Discharged on 4/6/23 with home medications, incentive spirometer and prescriptions sent from Doctor's office. Patient to follow-up with Dr. Dawson in 7 days.

## 2024-06-05 LAB
ANION GAP SERPL CALCULATED.3IONS-SCNC: 15 MMOL/L (ref 3–16)
BUN SERPL-MCNC: 12 MG/DL (ref 7–20)
CALCIUM SERPL-MCNC: 9.8 MG/DL (ref 8.3–10.6)
CHLORIDE SERPL-SCNC: 98 MMOL/L (ref 99–110)
CO2 SERPL-SCNC: 19 MMOL/L (ref 21–32)
CREAT SERPL-MCNC: 0.7 MG/DL (ref 0.8–1.3)
FOLATE SERPL-MCNC: 12.9 NG/ML (ref 4.78–24.2)
GFR SERPLBLD CREATININE-BSD FMLA CKD-EPI: >90 ML/MIN/{1.73_M2}
GLUCOSE SERPL-MCNC: 89 MG/DL (ref 70–99)
POTASSIUM SERPL-SCNC: 4.4 MMOL/L (ref 3.5–5.1)
SODIUM SERPL-SCNC: 132 MMOL/L (ref 136–145)
TSH SERPL DL<=0.005 MIU/L-ACNC: 0.92 UIU/ML (ref 0.27–4.2)
VIT B12 SERPL-MCNC: 1763 PG/ML (ref 211–911)

## 2024-07-23 ENCOUNTER — OFFICE VISIT (OUTPATIENT)
Dept: FAMILY MEDICINE CLINIC | Age: 69
End: 2024-07-23
Payer: MEDICARE

## 2024-07-23 VITALS
TEMPERATURE: 98.1 F | HEART RATE: 79 BPM | DIASTOLIC BLOOD PRESSURE: 85 MMHG | SYSTOLIC BLOOD PRESSURE: 125 MMHG | OXYGEN SATURATION: 90 % | BODY MASS INDEX: 24.25 KG/M2 | HEIGHT: 70 IN | WEIGHT: 169.4 LBS

## 2024-07-23 DIAGNOSIS — E78.00 PURE HYPERCHOLESTEROLEMIA: ICD-10-CM

## 2024-07-23 DIAGNOSIS — M06.9 RHEUMATOID ARTHRITIS INVOLVING MULTIPLE SITES, UNSPECIFIED WHETHER RHEUMATOID FACTOR PRESENT (HCC): ICD-10-CM

## 2024-07-23 DIAGNOSIS — F33.42 RECURRENT MAJOR DEPRESSIVE DISORDER, IN FULL REMISSION (HCC): ICD-10-CM

## 2024-07-23 DIAGNOSIS — I10 PRIMARY HYPERTENSION: Primary | ICD-10-CM

## 2024-07-23 DIAGNOSIS — K70.9 CHRONIC LIVER DISEASE DUE TO ALCOHOL (HCC): ICD-10-CM

## 2024-07-23 PROCEDURE — 1123F ACP DISCUSS/DSCN MKR DOCD: CPT | Performed by: NURSE PRACTITIONER

## 2024-07-23 PROCEDURE — 99214 OFFICE O/P EST MOD 30 MIN: CPT | Performed by: NURSE PRACTITIONER

## 2024-07-23 PROCEDURE — 3079F DIAST BP 80-89 MM HG: CPT | Performed by: NURSE PRACTITIONER

## 2024-07-23 PROCEDURE — 3074F SYST BP LT 130 MM HG: CPT | Performed by: NURSE PRACTITIONER

## 2024-07-23 ASSESSMENT — ENCOUNTER SYMPTOMS
SHORTNESS OF BREATH: 0
ALLERGIC/IMMUNOLOGIC NEGATIVE: 1
ABDOMINAL PAIN: 0
RESPIRATORY NEGATIVE: 1
CHEST TIGHTNESS: 0
EYES NEGATIVE: 1

## 2024-07-23 NOTE — PROGRESS NOTES
Subjective:      Patient ID: Enoc Mancia is a 69 y.o. male.    Chief Complaint   Patient presents with    Follow-up     6 month fu        HPI  Patient presents today to f/u on chronic conditions. Patient reports he continues to have lightheadedness when he first rises from a sitting position however no further syncope episodes. Patient states he is drinking fluids to stay well hydrated however also admits that he also drinks at least 10 beers/day.     Hypertension:  Home blood pressure monitoring: Yes - 130's over 80's. Patient denies chest pain, shortness of breath, headache, blurred vision, peripheral edema, palpitations, dry cough, and fatigue.  Antihypertensive medication side effects: no medication side effects noted.  Use of agents associated with hypertension: none.                                        Sodium (mmol/L)   Date Value   06/04/2024 132 (L)    BUN (mg/dL)   Date Value   06/04/2024 12    Glucose (mg/dL)   Date Value   06/04/2024 89      Potassium (mmol/L)   Date Value   06/04/2024 4.4     Potassium reflex Magnesium (mmol/L)   Date Value   02/20/2024 4.5    Creatinine (mg/dL)   Date Value   06/04/2024 0.7 (L)         Hyperlipidemia:  No new myalgias or GI upset on simvastatin (Zocor).     Lab Results   Component Value Date    CHOL 169 07/19/2023    TRIG 54 07/19/2023    HDL 80 (H) 07/19/2023    LDLDIRECT 99 06/20/2019     Lab Results   Component Value Date     (H) 02/20/2024     (H) 02/20/2024          Review of Systems   Constitutional: Negative.  Negative for activity change, appetite change, chills, fatigue and unexpected weight change.   HENT: Negative.     Eyes: Negative.    Respiratory: Negative.  Negative for chest tightness and shortness of breath.    Cardiovascular: Negative.  Negative for chest pain, palpitations and leg swelling.   Gastrointestinal:  Negative for abdominal pain.   Endocrine: Negative.    Genitourinary: Negative.    Musculoskeletal: Negative.    Skin:

## 2024-09-10 ENCOUNTER — OFFICE VISIT (OUTPATIENT)
Age: 69
End: 2024-09-10
Payer: MEDICARE

## 2024-09-10 VITALS
BODY MASS INDEX: 24.05 KG/M2 | WEIGHT: 168 LBS | HEIGHT: 70 IN | DIASTOLIC BLOOD PRESSURE: 78 MMHG | OXYGEN SATURATION: 96 % | HEART RATE: 70 BPM | RESPIRATION RATE: 16 BRPM | SYSTOLIC BLOOD PRESSURE: 130 MMHG

## 2024-09-10 DIAGNOSIS — F12.20 ADDICTION, MARIJUANA (HCC): ICD-10-CM

## 2024-09-10 DIAGNOSIS — R56.9 SEIZURE (HCC): Primary | ICD-10-CM

## 2024-09-10 PROCEDURE — 3075F SYST BP GE 130 - 139MM HG: CPT | Performed by: PSYCHIATRY & NEUROLOGY

## 2024-09-10 PROCEDURE — 3078F DIAST BP <80 MM HG: CPT | Performed by: PSYCHIATRY & NEUROLOGY

## 2024-09-10 PROCEDURE — 1123F ACP DISCUSS/DSCN MKR DOCD: CPT | Performed by: PSYCHIATRY & NEUROLOGY

## 2024-09-10 PROCEDURE — 99204 OFFICE O/P NEW MOD 45 MIN: CPT | Performed by: PSYCHIATRY & NEUROLOGY

## 2024-09-24 ENCOUNTER — HOSPITAL ENCOUNTER (OUTPATIENT)
Dept: MRI IMAGING | Age: 69
Discharge: HOME | End: 2024-09-24
Attending: PSYCHIATRY & NEUROLOGY
Payer: MEDICARE

## 2024-09-24 DIAGNOSIS — R56.9 SEIZURE (HCC): ICD-10-CM

## 2024-09-24 PROCEDURE — 70551 MRI BRAIN STEM W/O DYE: CPT

## 2024-10-01 ENCOUNTER — HOSPITAL ENCOUNTER (OUTPATIENT)
Dept: NEUROLOGY | Age: 69
Discharge: HOME | End: 2024-10-01
Payer: MEDICARE

## 2024-10-01 DIAGNOSIS — R56.9 SEIZURE (HCC): ICD-10-CM

## 2024-10-01 PROCEDURE — 95813 EEG EXTND MNTR 61-119 MIN: CPT

## 2024-10-01 PROCEDURE — 95813 EEG EXTND MNTR 61-119 MIN: CPT | Performed by: PSYCHIATRY & NEUROLOGY

## 2024-10-01 NOTE — PROCEDURES
INTERPRETATION:  This 61-minute, computer-assisted video EEG recording is normal.  No potentially epileptiform activity was present during the recording.      CLASSIFICATION:  Normal (awake and drowsy).  Sleep - unsuccessful.  EKG channel.    DESCRIPTION:    BACKGROUND:  The awake recording revealed 9 Hz alpha activity over the posterior head region.  Given the extensive study, the patient still did not sleep.  The EEG showed normal V waves during drowsiness.  There was no significant change on the EEG background with photic stimulation.  Hyperventilation was omitted due to old age.      INTERICTAL DISCHARGES: None    CLINICAL EVENTS:  None    The EKG channel was unremarkable.

## 2024-10-21 ENCOUNTER — OFFICE VISIT (OUTPATIENT)
Age: 69
End: 2024-10-21
Payer: MEDICARE

## 2024-10-21 VITALS
DIASTOLIC BLOOD PRESSURE: 74 MMHG | OXYGEN SATURATION: 94 % | HEART RATE: 64 BPM | WEIGHT: 165 LBS | SYSTOLIC BLOOD PRESSURE: 124 MMHG | RESPIRATION RATE: 16 BRPM | HEIGHT: 70 IN | BODY MASS INDEX: 23.62 KG/M2

## 2024-10-21 DIAGNOSIS — R93.0 ABNORMAL MRI OF THE HEAD: ICD-10-CM

## 2024-10-21 DIAGNOSIS — R41.3 MEMORY LOSS: ICD-10-CM

## 2024-10-21 DIAGNOSIS — R56.9 SEIZURE (HCC): Primary | ICD-10-CM

## 2024-10-21 PROCEDURE — 1123F ACP DISCUSS/DSCN MKR DOCD: CPT | Performed by: PSYCHIATRY & NEUROLOGY

## 2024-10-21 PROCEDURE — 99214 OFFICE O/P EST MOD 30 MIN: CPT | Performed by: PSYCHIATRY & NEUROLOGY

## 2024-10-21 PROCEDURE — 3074F SYST BP LT 130 MM HG: CPT | Performed by: PSYCHIATRY & NEUROLOGY

## 2024-10-21 PROCEDURE — 3078F DIAST BP <80 MM HG: CPT | Performed by: PSYCHIATRY & NEUROLOGY

## 2024-10-21 RX ORDER — TAMSULOSIN HYDROCHLORIDE 0.4 MG/1
CAPSULE ORAL
COMMUNITY
Start: 2024-08-10

## 2024-10-21 NOTE — PROGRESS NOTES
Valeriy Talley MD at [facility] ENDOSCOPY    CYSTOSCOPY      OTHER SURGICAL HISTORY  09/27/2012    muscle biopsy left shoulder    UPPER GASTROINTESTINAL ENDOSCOPY  7/25/2013    dysphagia    WRIST SURGERY      left        Medication:    Current Outpatient Medications   Medication Sig Dispense Refill    tamsulosin (FLOMAX) 0.4 MG capsule       Probiotic Product (CULTURELLE PROBIOTICS PO) Take by mouth daily      lisinopril (PRINIVIL;ZESTRIL) 30 MG tablet Take 0.5 tablets by mouth 2 times daily 90 tablet 3    magnesium citrate solution Take 296 mLs by mouth once for 1 dose 296 mL 0    sulindac (CLINORIL) 150 MG tablet TAKE 1 TABLET BY MOUTH 2 TIMES DAILY. 180 tablet 3    simvastatin (ZOCOR) 20 MG tablet Take 1 tablet by mouth nightly 90 tablet 3    potassium chloride (KLOR-CON 10) 10 MEQ extended release tablet Take 1 tablet by mouth daily 90 tablet 3    metoprolol succinate (TOPROL XL) 25 MG extended release tablet Take 1 tablet by mouth nightly 90 tablet 3    FLUoxetine (PROZAC) 40 MG capsule TAKE 2 CAPSULES BY MOUTH ONCE DAILY 180 capsule 3    finasteride (PROSCAR) 5 MG tablet Take 1 tablet by mouth daily 90 tablet 3    buPROPion (WELLBUTRIN XL) 300 MG extended release tablet Take 1 tablet by mouth every morning 90 tablet 3    amLODIPine (NORVASC) 5 MG tablet Take 1 tablet by mouth in the morning and at bedtime 180 tablet 3    omeprazole (PRILOSEC) 20 MG delayed release capsule Take 1 capsule by mouth Daily 90 capsule 3    ondansetron (ZOFRAN) 4 MG tablet Take 1 tablet by mouth 3 times daily as needed for Nausea or Vomiting 30 tablet 0    sildenafil (VIAGRA) 25 MG tablet Take 3-4 tablets by mouth as needed for Erectile Dysfunction      Cholecalciferol (VITAMIN D HIGH POTENCY PO) Take 500 mcg by mouth daily      vitamin B-12 (CYANOCOBALAMIN) 500 MCG tablet Take 1 tablet by mouth daily      polycarbophil (FIBERCON) 625 MG tablet Take 1 tablet by mouth daily      aspirin 81 MG chewable tablet Take 1 tablet by

## 2024-10-21 NOTE — PATIENT INSTRUCTIONS
YOU MUST CONFIRM YOUR APPOINTMENT 1 DAY PRIOR OR IT WILL BE CANCELLED!!   Our office will call you 3 times the day prior to your appointment in an attempt to confirm.  Please return our call ASAP or confirm your appt through [facility] no later than 3 pm the day before your appointment.  If we do not hear back from you by 3 pm to confirm, your appointment will be cancelled & someone will be added into that slot from our wait list.

## 2024-10-29 ENCOUNTER — OFFICE VISIT (OUTPATIENT)
Dept: FAMILY MEDICINE CLINIC | Age: 69
End: 2024-10-29

## 2024-10-29 VITALS
OXYGEN SATURATION: 99 % | HEIGHT: 70 IN | HEART RATE: 88 BPM | DIASTOLIC BLOOD PRESSURE: 76 MMHG | TEMPERATURE: 97.9 F | WEIGHT: 165 LBS | BODY MASS INDEX: 23.62 KG/M2 | SYSTOLIC BLOOD PRESSURE: 118 MMHG

## 2024-10-29 DIAGNOSIS — F33.41 RECURRENT MAJOR DEPRESSIVE DISORDER, IN PARTIAL REMISSION (HCC): ICD-10-CM

## 2024-10-29 DIAGNOSIS — Z23 NEED FOR INFLUENZA VACCINATION: Primary | ICD-10-CM

## 2024-10-29 RX ORDER — BUPROPION HYDROCHLORIDE 150 MG/1
150 TABLET ORAL EVERY MORNING
Qty: 30 TABLET | Refills: 0
Start: 2024-10-29 | End: 2024-10-30 | Stop reason: SDUPTHER

## 2024-10-29 ASSESSMENT — ENCOUNTER SYMPTOMS
EYES NEGATIVE: 1
RESPIRATORY NEGATIVE: 1
GASTROINTESTINAL NEGATIVE: 1
SHORTNESS OF BREATH: 0
COUGH: 0
ALLERGIC/IMMUNOLOGIC NEGATIVE: 1
CHEST TIGHTNESS: 0

## 2024-10-29 NOTE — PROGRESS NOTES
Subjective:      Patient ID: Enoc Mancia is a 69 y.o. male.    Chief Complaint   Patient presents with    Medication Check        HPI    Patient presents today to discuss Wellbutrin.  Patient was started on Wellbutrin approximately 1 year ago for depression.  Patient developed seizures a few months ago and is now following with neurology.  Wellbutrin can lower the threshold for seizures thus is not the best medication choice for patient to be currently taking.  Patient states he does have some mild intermittent depression at this time however would like to just go off the medication and see how he does.  Patient also takes fluoxetine 80 mg daily.    Review of Systems   Constitutional: Negative.  Negative for activity change, appetite change, chills, fever and unexpected weight change.   HENT: Negative.  Negative for sneezing.    Eyes: Negative.    Respiratory: Negative.  Negative for cough, chest tightness and shortness of breath.    Cardiovascular: Negative.  Negative for chest pain, palpitations and leg swelling.   Gastrointestinal: Negative.    Endocrine: Negative.    Genitourinary: Negative.    Skin: Negative.    Allergic/Immunologic: Negative.    Neurological:  Negative for dizziness and headaches.   Psychiatric/Behavioral:  Positive for dysphoric mood (Mild intermittent).        Patient Active Problem List   Diagnosis    Muscular weakness    Depression    Acid reflux    Hyperlipidemia    Hypertension    Muscle pain    Hearing decreased    Arthritis    Enlarged prostate    Chronic fatigue    Dizziness    Palpitations    Rheumatoid arthritis (HCC)    History of tobacco abuse    Hx of colonic polyps    Fatty liver    Chronic liver disease due to alcohol (HCC)    Recurrent major depressive disorder, in full remission (HCC)       No outpatient medications have been marked as taking for the 10/29/24 encounter (Appointment) with Sofia Whitt APRN - CNP.       Allergies   Allergen Reactions    Codeine

## 2024-10-30 ENCOUNTER — TELEPHONE (OUTPATIENT)
Dept: FAMILY MEDICINE CLINIC | Age: 69
End: 2024-10-30

## 2024-10-30 DIAGNOSIS — F33.41 RECURRENT MAJOR DEPRESSIVE DISORDER, IN PARTIAL REMISSION (HCC): ICD-10-CM

## 2024-10-30 RX ORDER — BUPROPION HYDROCHLORIDE 150 MG/1
150 TABLET ORAL EVERY MORNING
Qty: 30 TABLET | Refills: 0 | Status: SHIPPED | OUTPATIENT
Start: 2024-10-30

## 2024-10-30 NOTE — TELEPHONE ENCOUNTER
Pt spouse called and states they were in office with provider on 10/29/2024 and was told they would get a handicap placard letter and did not get one. Ok to provider letter to pt, and with what dx?Please advise. Thank you

## 2024-10-30 NOTE — TELEPHONE ENCOUNTER
Pt spouse called and stated the dose was changed but she will need refill on the new dose.     Refill Request     CONFIRM preferrred pharmacy with the patient.    If Mail Order Rx - Pend for 90 day refill.      Last Seen: Last Seen Department: 10/29/2024  Last Seen by PCP: 10/29/2024      If no future appointment scheduled, route STAFF MESSAGE with patient name to the  Pool for scheduling.      Next Appointment:   Future Appointments   Date Time Provider Department Center   1/22/2025 11:00 AM Sofia Whitt, APRN - CNP [facility]   4/22/2025 11:45 AM Sparkle Johnson MD [facility] Neurology -   5/22/2025  9:00 AM Jodrey, Sofia D, APRN - CNP [facility]       Message sent to  to schedule appt with patient?  NO      Requested Prescriptions     Pending Prescriptions Disp Refills    buPROPion (WELLBUTRIN XL) 150 MG extended release tablet 30 tablet 0     Sig: Take 1 tablet by mouth every morning X 2 weeks then every other day X 2 weeks

## 2024-12-23 RX ORDER — TAMSULOSIN HYDROCHLORIDE 0.4 MG/1
CAPSULE ORAL
Qty: 90 CAPSULE | Refills: 3 | Status: SHIPPED | OUTPATIENT
Start: 2024-12-23

## 2024-12-25 DIAGNOSIS — F33.2 SEVERE EPISODE OF RECURRENT MAJOR DEPRESSIVE DISORDER, WITHOUT PSYCHOTIC FEATURES (HCC): ICD-10-CM

## 2024-12-26 RX ORDER — FLUOXETINE 40 MG/1
CAPSULE ORAL
Qty: 180 CAPSULE | Refills: 3 | Status: SHIPPED | OUTPATIENT
Start: 2024-12-26

## 2025-01-01 ENCOUNTER — TELEPHONE (OUTPATIENT)
Dept: INTERVENTIONAL RADIOLOGY/VASCULAR | Age: 70
End: 2025-01-01

## 2025-01-08 DIAGNOSIS — E78.00 PURE HYPERCHOLESTEROLEMIA: ICD-10-CM

## 2025-01-08 RX ORDER — SIMVASTATIN 20 MG
20 TABLET ORAL NIGHTLY
Qty: 90 TABLET | Refills: 3 | Status: SHIPPED | OUTPATIENT
Start: 2025-01-08

## 2025-01-22 ENCOUNTER — OFFICE VISIT (OUTPATIENT)
Dept: FAMILY MEDICINE CLINIC | Age: 70
End: 2025-01-22

## 2025-01-22 VITALS
BODY MASS INDEX: 24.31 KG/M2 | HEIGHT: 70 IN | SYSTOLIC BLOOD PRESSURE: 122 MMHG | TEMPERATURE: 97.8 F | HEART RATE: 76 BPM | DIASTOLIC BLOOD PRESSURE: 78 MMHG | OXYGEN SATURATION: 97 % | WEIGHT: 169.8 LBS

## 2025-01-22 DIAGNOSIS — F33.41 RECURRENT MAJOR DEPRESSIVE DISORDER, IN PARTIAL REMISSION (HCC): ICD-10-CM

## 2025-01-22 DIAGNOSIS — Z79.899 MEDICATION MANAGEMENT: ICD-10-CM

## 2025-01-22 DIAGNOSIS — I10 PRIMARY HYPERTENSION: Primary | ICD-10-CM

## 2025-01-22 DIAGNOSIS — R11.2 NAUSEA AND VOMITING, UNSPECIFIED VOMITING TYPE: ICD-10-CM

## 2025-01-22 DIAGNOSIS — E78.00 PURE HYPERCHOLESTEROLEMIA: ICD-10-CM

## 2025-01-22 DIAGNOSIS — N40.0 ENLARGED PROSTATE: ICD-10-CM

## 2025-01-22 DIAGNOSIS — M06.9 RHEUMATOID ARTHRITIS INVOLVING MULTIPLE SITES, UNSPECIFIED WHETHER RHEUMATOID FACTOR PRESENT (HCC): ICD-10-CM

## 2025-01-22 DIAGNOSIS — F12.20 ADDICTION, MARIJUANA (HCC): ICD-10-CM

## 2025-01-22 DIAGNOSIS — Z12.5 PROSTATE CANCER SCREENING: ICD-10-CM

## 2025-01-22 PROBLEM — F33.2 SEVERE EPISODE OF RECURRENT MAJOR DEPRESSIVE DISORDER, WITHOUT PSYCHOTIC FEATURES (HCC): Status: RESOLVED | Noted: 2025-01-22 | Resolved: 2025-01-22

## 2025-01-22 PROBLEM — R56.9 SEIZURE (HCC): Status: ACTIVE | Noted: 2025-01-22

## 2025-01-22 PROBLEM — R56.9 SEIZURE (HCC): Status: RESOLVED | Noted: 2025-01-22 | Resolved: 2025-01-22

## 2025-01-22 PROBLEM — F33.2 SEVERE EPISODE OF RECURRENT MAJOR DEPRESSIVE DISORDER, WITHOUT PSYCHOTIC FEATURES (HCC): Status: ACTIVE | Noted: 2025-01-22

## 2025-01-22 RX ORDER — LISINOPRIL 30 MG/1
15 TABLET ORAL 2 TIMES DAILY
Qty: 90 TABLET | Refills: 3 | Status: SHIPPED | OUTPATIENT
Start: 2025-01-22 | End: 2026-01-17

## 2025-01-22 RX ORDER — SULINDAC 150 MG/1
TABLET ORAL
Qty: 180 TABLET | Refills: 3 | Status: CANCELLED | OUTPATIENT
Start: 2025-01-22

## 2025-01-22 RX ORDER — AMLODIPINE BESYLATE 5 MG/1
TABLET ORAL
Qty: 180 TABLET | Refills: 3 | Status: SHIPPED | OUTPATIENT
Start: 2025-01-22

## 2025-01-22 RX ORDER — BUPROPION HYDROCHLORIDE 150 MG/1
150 TABLET ORAL EVERY MORNING
Qty: 30 TABLET | Refills: 0 | Status: CANCELLED | OUTPATIENT
Start: 2025-01-22

## 2025-01-22 RX ORDER — POTASSIUM CHLORIDE 750 MG/1
10 TABLET, EXTENDED RELEASE ORAL DAILY
Qty: 90 TABLET | Refills: 3 | Status: SHIPPED | OUTPATIENT
Start: 2025-01-22

## 2025-01-22 RX ORDER — METOPROLOL SUCCINATE 25 MG/1
25 TABLET, EXTENDED RELEASE ORAL NIGHTLY
Qty: 90 TABLET | Refills: 3 | Status: SHIPPED | OUTPATIENT
Start: 2025-01-22

## 2025-01-22 RX ORDER — FINASTERIDE 5 MG/1
5 TABLET, FILM COATED ORAL DAILY
Qty: 90 TABLET | Refills: 3 | Status: SHIPPED | OUTPATIENT
Start: 2025-01-22

## 2025-01-22 RX ORDER — ONDANSETRON 4 MG/1
4 TABLET, FILM COATED ORAL 3 TIMES DAILY PRN
Qty: 30 TABLET | Refills: 0 | Status: SHIPPED | OUTPATIENT
Start: 2025-01-22

## 2025-01-22 SDOH — ECONOMIC STABILITY: FOOD INSECURITY: WITHIN THE PAST 12 MONTHS, THE FOOD YOU BOUGHT JUST DIDN'T LAST AND YOU DIDN'T HAVE MONEY TO GET MORE.: NEVER TRUE

## 2025-01-22 SDOH — ECONOMIC STABILITY: FOOD INSECURITY: WITHIN THE PAST 12 MONTHS, YOU WORRIED THAT YOUR FOOD WOULD RUN OUT BEFORE YOU GOT MONEY TO BUY MORE.: NEVER TRUE

## 2025-01-22 ASSESSMENT — PATIENT HEALTH QUESTIONNAIRE - PHQ9
10. IF YOU CHECKED OFF ANY PROBLEMS, HOW DIFFICULT HAVE THESE PROBLEMS MADE IT FOR YOU TO DO YOUR WORK, TAKE CARE OF THINGS AT HOME, OR GET ALONG WITH OTHER PEOPLE: NOT DIFFICULT AT ALL
SUM OF ALL RESPONSES TO PHQ QUESTIONS 1-9: 1
5. POOR APPETITE OR OVEREATING: SEVERAL DAYS
7. TROUBLE CONCENTRATING ON THINGS, SUCH AS READING THE NEWSPAPER OR WATCHING TELEVISION: NOT AT ALL
4. FEELING TIRED OR HAVING LITTLE ENERGY: NOT AT ALL
8. MOVING OR SPEAKING SO SLOWLY THAT OTHER PEOPLE COULD HAVE NOTICED. OR THE OPPOSITE, BEING SO FIGETY OR RESTLESS THAT YOU HAVE BEEN MOVING AROUND A LOT MORE THAN USUAL: NOT AT ALL
1. LITTLE INTEREST OR PLEASURE IN DOING THINGS: NOT AT ALL
SUM OF ALL RESPONSES TO PHQ QUESTIONS 1-9: 1
2. FEELING DOWN, DEPRESSED OR HOPELESS: NOT AT ALL
SUM OF ALL RESPONSES TO PHQ9 QUESTIONS 1 & 2: 0
3. TROUBLE FALLING OR STAYING ASLEEP: NOT AT ALL
6. FEELING BAD ABOUT YOURSELF - OR THAT YOU ARE A FAILURE OR HAVE LET YOURSELF OR YOUR FAMILY DOWN: NOT AT ALL
9. THOUGHTS THAT YOU WOULD BE BETTER OFF DEAD, OR OF HURTING YOURSELF: NOT AT ALL

## 2025-01-22 ASSESSMENT — ENCOUNTER SYMPTOMS
RESPIRATORY NEGATIVE: 1
COUGH: 0
GASTROINTESTINAL NEGATIVE: 1
ALLERGIC/IMMUNOLOGIC NEGATIVE: 1
SHORTNESS OF BREATH: 0
EYES NEGATIVE: 1
CHEST TIGHTNESS: 0

## 2025-01-22 NOTE — PROGRESS NOTES
Subjective:      Patient ID: Enoc Mancia is a 69 y.o. male.    Chief Complaint   Patient presents with    6 Month Follow-Up    Depression    Hypertension    Hyperlipidemia        HPI    Patient presents today to follow-up on chronic conditions.  Patient reports overall he is doing fairly well.  Patient does have increased joint pain in the winter months.  Patient continues to follow with rheumatology as advised.  Patient requests to have TB test today and CRP added to blood work for rheumatology.      Hypertension:  Home blood pressure monitoring: Yes - well controlled 120's over 70's. Patient denies chest pain, shortness of breath, headache, lightheadedness, blurred vision, peripheral edema, palpitations, dry cough, and fatigue.  Antihypertensive medication side effects: no medication side effects noted.  Use of agents associated with hypertension: none.                                        Sodium (mmol/L)   Date Value   06/04/2024 132 (L)    BUN (mg/dL)   Date Value   06/04/2024 12    Glucose (mg/dL)   Date Value   06/04/2024 89      Potassium (mmol/L)   Date Value   06/04/2024 4.4     Potassium reflex Magnesium (mmol/L)   Date Value   02/20/2024 4.5    Creatinine (mg/dL)   Date Value   06/04/2024 0.7 (L)         Hyperlipidemia:  No new myalgias or GI upset on simvastatin (Zocor).     Lab Results   Component Value Date    CHOL 169 07/19/2023    TRIG 54 07/19/2023    HDL 80 (H) 07/19/2023    LDLDIRECT 99 06/20/2019     Lab Results   Component Value Date     (H) 02/20/2024     (H) 02/20/2024        Review of Systems   Constitutional: Negative.  Negative for activity change, appetite change, chills, fever and unexpected weight change.   HENT: Negative.  Negative for sneezing.    Eyes: Negative.    Respiratory: Negative.  Negative for cough, chest tightness and shortness of breath.    Cardiovascular: Negative.  Negative for chest pain, palpitations and leg swelling.   Gastrointestinal: Negative.

## 2025-01-23 LAB
ALBUMIN SERPL-MCNC: 3.9 G/DL (ref 3.4–5)
ALBUMIN/GLOB SERPL: 1.3 {RATIO} (ref 1.1–2.2)
ALP SERPL-CCNC: 125 U/L (ref 40–129)
ALT SERPL-CCNC: 233 U/L (ref 10–40)
ANION GAP SERPL CALCULATED.3IONS-SCNC: 9 MMOL/L (ref 3–16)
AST SERPL-CCNC: 310 U/L (ref 15–37)
BASOPHILS # BLD: 0.1 K/UL (ref 0–0.2)
BASOPHILS NFR BLD: 1.1 %
BILIRUB SERPL-MCNC: 0.5 MG/DL (ref 0–1)
BUN SERPL-MCNC: 8 MG/DL (ref 7–20)
CALCIUM SERPL-MCNC: 9.3 MG/DL (ref 8.3–10.6)
CHLORIDE SERPL-SCNC: 97 MMOL/L (ref 99–110)
CHOLEST SERPL-MCNC: 148 MG/DL (ref 0–199)
CO2 SERPL-SCNC: 25 MMOL/L (ref 21–32)
CREAT SERPL-MCNC: 0.6 MG/DL (ref 0.8–1.3)
CRP SERPL-MCNC: 14.7 MG/L (ref 0–5.1)
DEPRECATED RDW RBC AUTO: 13.1 % (ref 12.4–15.4)
EOSINOPHIL # BLD: 0 K/UL (ref 0–0.6)
EOSINOPHIL NFR BLD: 0.3 %
GFR SERPLBLD CREATININE-BSD FMLA CKD-EPI: >90 ML/MIN/{1.73_M2}
GLUCOSE SERPL-MCNC: 82 MG/DL (ref 70–99)
HCT VFR BLD AUTO: 39.9 % (ref 40.5–52.5)
HDLC SERPL-MCNC: 58 MG/DL (ref 40–60)
HGB BLD-MCNC: 13.6 G/DL (ref 13.5–17.5)
LDLC SERPL CALC-MCNC: 78 MG/DL
LYMPHOCYTES # BLD: 0.6 K/UL (ref 1–5.1)
LYMPHOCYTES NFR BLD: 10.3 %
MCH RBC QN AUTO: 35.8 PG (ref 26–34)
MCHC RBC AUTO-ENTMCNC: 34.1 G/DL (ref 31–36)
MCV RBC AUTO: 105.1 FL (ref 80–100)
MONOCYTES # BLD: 0.4 K/UL (ref 0–1.3)
MONOCYTES NFR BLD: 6.9 %
NEUTROPHILS # BLD: 5.1 K/UL (ref 1.7–7.7)
NEUTROPHILS NFR BLD: 81.4 %
PLATELET # BLD AUTO: 196 K/UL (ref 135–450)
PMV BLD AUTO: 9 FL (ref 5–10.5)
POTASSIUM SERPL-SCNC: 4.2 MMOL/L (ref 3.5–5.1)
PROT SERPL-MCNC: 6.9 G/DL (ref 6.4–8.2)
PSA SERPL DL<=0.01 NG/ML-MCNC: 0.22 NG/ML (ref 0–4)
RBC # BLD AUTO: 3.8 M/UL (ref 4.2–5.9)
SODIUM SERPL-SCNC: 131 MMOL/L (ref 136–145)
TRIGL SERPL-MCNC: 59 MG/DL (ref 0–150)
VLDLC SERPL CALC-MCNC: 12 MG/DL
WBC # BLD AUTO: 6.3 K/UL (ref 4–11)

## 2025-01-24 ENCOUNTER — NURSE ONLY (OUTPATIENT)
Dept: FAMILY MEDICINE CLINIC | Age: 70
End: 2025-01-24

## 2025-01-24 LAB
INDURATION: NORMAL
TB SKIN TEST: NEGATIVE

## 2025-02-24 ENCOUNTER — HOSPITAL ENCOUNTER (EMERGENCY)
Age: 70
Discharge: HOME | End: 2025-02-24
Attending: EMERGENCY MEDICINE
Payer: MEDICARE

## 2025-02-24 ENCOUNTER — APPOINTMENT (OUTPATIENT)
Dept: CT IMAGING | Age: 70
End: 2025-02-24
Payer: MEDICARE

## 2025-02-24 VITALS
RESPIRATION RATE: 14 BRPM | SYSTOLIC BLOOD PRESSURE: 123 MMHG | BODY MASS INDEX: 25.25 KG/M2 | WEIGHT: 176 LBS | OXYGEN SATURATION: 97 % | HEART RATE: 73 BPM | TEMPERATURE: 98 F | DIASTOLIC BLOOD PRESSURE: 78 MMHG

## 2025-02-24 DIAGNOSIS — K70.31 ALCOHOLIC CIRRHOSIS OF LIVER WITH ASCITES (HCC): Primary | ICD-10-CM

## 2025-02-24 DIAGNOSIS — K76.89 LIVER NODULE: ICD-10-CM

## 2025-02-24 LAB
ALBUMIN SERPL-MCNC: 2.9 G/DL (ref 3.4–5)
ALBUMIN/GLOB SERPL: 0.7 {RATIO} (ref 1.1–2.2)
ALP SERPL-CCNC: 154 U/L (ref 40–129)
ALT SERPL-CCNC: 152 U/L (ref 10–40)
ANION GAP SERPL CALCULATED.3IONS-SCNC: 9 MMOL/L (ref 3–16)
AST SERPL-CCNC: 255 U/L (ref 15–37)
BASOPHILS # BLD: 0 K/UL (ref 0–0.2)
BASOPHILS NFR BLD: 0.6 %
BILIRUB SERPL-MCNC: 0.7 MG/DL (ref 0–1)
BILIRUB UR QL STRIP.AUTO: ABNORMAL
BUN SERPL-MCNC: 9 MG/DL (ref 7–20)
CALCIUM SERPL-MCNC: 8 MG/DL (ref 8.3–10.6)
CHLORIDE SERPL-SCNC: 94 MMOL/L (ref 99–110)
CLARITY UR: CLEAR
CO2 SERPL-SCNC: 21 MMOL/L (ref 21–32)
COLOR UR: YELLOW
CREAT SERPL-MCNC: 0.6 MG/DL (ref 0.8–1.3)
DEPRECATED RDW RBC AUTO: 13.4 % (ref 12.4–15.4)
EOSINOPHIL # BLD: 0 K/UL (ref 0–0.6)
EOSINOPHIL NFR BLD: 0.1 %
FLUAV RNA RESP QL NAA+PROBE: NOT DETECTED
FLUBV RNA RESP QL NAA+PROBE: NOT DETECTED
GFR SERPLBLD CREATININE-BSD FMLA CKD-EPI: >90 ML/MIN/{1.73_M2}
GLUCOSE SERPL-MCNC: 93 MG/DL (ref 70–99)
GLUCOSE UR STRIP.AUTO-MCNC: NEGATIVE MG/DL
HCT VFR BLD AUTO: 40.8 % (ref 40.5–52.5)
HGB BLD-MCNC: 14.3 G/DL (ref 13.5–17.5)
HGB UR QL STRIP.AUTO: NEGATIVE
KETONES UR STRIP.AUTO-MCNC: NEGATIVE MG/DL
LEUKOCYTE ESTERASE UR QL STRIP.AUTO: NEGATIVE
LIPASE SERPL-CCNC: 111 U/L (ref 13–60)
LYMPHOCYTES # BLD: 0.3 K/UL (ref 1–5.1)
LYMPHOCYTES NFR BLD: 5.1 %
MCH RBC QN AUTO: 36 PG (ref 26–34)
MCHC RBC AUTO-ENTMCNC: 35.1 G/DL (ref 31–36)
MCV RBC AUTO: 102.6 FL (ref 80–100)
MONOCYTES # BLD: 0.4 K/UL (ref 0–1.3)
MONOCYTES NFR BLD: 6 %
NEUTROPHILS # BLD: 5.7 K/UL (ref 1.7–7.7)
NEUTROPHILS NFR BLD: 88.2 %
NITRITE UR QL STRIP.AUTO: NEGATIVE
PH UR STRIP.AUTO: 6 [PH] (ref 5–8)
PLATELET # BLD AUTO: 120 K/UL (ref 135–450)
PMV BLD AUTO: 8.6 FL (ref 5–10.5)
POTASSIUM SERPL-SCNC: 3.8 MMOL/L (ref 3.5–5.1)
PROT SERPL-MCNC: 7.2 G/DL (ref 6.4–8.2)
PROT UR STRIP.AUTO-MCNC: NEGATIVE MG/DL
RBC # BLD AUTO: 3.98 M/UL (ref 4.2–5.9)
SARS-COV-2 RNA RESP QL NAA+PROBE: NOT DETECTED
SODIUM SERPL-SCNC: 124 MMOL/L (ref 136–145)
SP GR UR STRIP.AUTO: 1.02 (ref 1–1.03)
UA COMPLETE W REFLEX CULTURE PNL UR: ABNORMAL
UA DIPSTICK W REFLEX MICRO PNL UR: ABNORMAL
URN SPEC COLLECT METH UR: ABNORMAL
UROBILINOGEN UR STRIP-ACNC: 1 E.U./DL
WBC # BLD AUTO: 6.5 K/UL (ref 4–11)

## 2025-02-24 PROCEDURE — 6370000000 HC RX 637 (ALT 250 FOR IP): Performed by: EMERGENCY MEDICINE

## 2025-02-24 PROCEDURE — 99285 EMERGENCY DEPT VISIT HI MDM: CPT

## 2025-02-24 PROCEDURE — 87636 SARSCOV2 & INF A&B AMP PRB: CPT

## 2025-02-24 PROCEDURE — 74177 CT ABD & PELVIS W/CONTRAST: CPT

## 2025-02-24 PROCEDURE — 83690 ASSAY OF LIPASE: CPT

## 2025-02-24 PROCEDURE — 81003 URINALYSIS AUTO W/O SCOPE: CPT

## 2025-02-24 PROCEDURE — 85025 COMPLETE CBC W/AUTO DIFF WBC: CPT

## 2025-02-24 PROCEDURE — 36415 COLL VENOUS BLD VENIPUNCTURE: CPT

## 2025-02-24 PROCEDURE — 80053 COMPREHEN METABOLIC PANEL: CPT

## 2025-02-24 PROCEDURE — 2580000003 HC RX 258: Performed by: EMERGENCY MEDICINE

## 2025-02-24 PROCEDURE — 6360000004 HC RX CONTRAST MEDICATION: Performed by: EMERGENCY MEDICINE

## 2025-02-24 RX ORDER — DICYCLOMINE HCL 20 MG
20 TABLET ORAL 4 TIMES DAILY
Qty: 20 TABLET | Refills: 0 | Status: SHIPPED | OUTPATIENT
Start: 2025-02-24

## 2025-02-24 RX ORDER — DICYCLOMINE HYDROCHLORIDE 10 MG/1
20 CAPSULE ORAL ONCE
Status: COMPLETED | OUTPATIENT
Start: 2025-02-24 | End: 2025-02-24

## 2025-02-24 RX ORDER — 0.9 % SODIUM CHLORIDE 0.9 %
1000 INTRAVENOUS SOLUTION INTRAVENOUS ONCE
Status: COMPLETED | OUTPATIENT
Start: 2025-02-24 | End: 2025-02-24

## 2025-02-24 RX ORDER — IOPAMIDOL 755 MG/ML
75 INJECTION, SOLUTION INTRAVASCULAR
Status: COMPLETED | OUTPATIENT
Start: 2025-02-24 | End: 2025-02-24

## 2025-02-24 RX ADMIN — DICYCLOMINE HYDROCHLORIDE 20 MG: 10 CAPSULE ORAL at 13:15

## 2025-02-24 RX ADMIN — IOPAMIDOL 75 ML: 755 INJECTION, SOLUTION INTRAVENOUS at 11:15

## 2025-02-24 RX ADMIN — SODIUM CHLORIDE 1000 ML: 0.9 INJECTION, SOLUTION INTRAVENOUS at 11:31

## 2025-02-24 ASSESSMENT — ENCOUNTER SYMPTOMS
VOICE CHANGE: 0
ABDOMINAL DISTENTION: 1
ABDOMINAL PAIN: 0
COLOR CHANGE: 0
NAUSEA: 0
PHOTOPHOBIA: 0
BACK PAIN: 0
VOMITING: 0
WHEEZING: 0
TROUBLE SWALLOWING: 0
SHORTNESS OF BREATH: 0
BLOOD IN STOOL: 0
STRIDOR: 0
FACIAL SWELLING: 0

## 2025-02-24 NOTE — ED PROVIDER NOTES
[facility] EMERGENCY DEPARTMENT  eMERGENCY dEPARTMENT eNCOUnter      Pt Name: Enoc Mancia  MRN: [id number]  Birthdate 1955  Date of evaluation: 2/24/2025  Provider: Julius Navarrete MD    CHIEF COMPLAINT       Chief Complaint   Patient presents with    Bloated     Swelling in his abdomen.  Started 2 weeks ago and has had a 15 lb weight gain since the swelling.  2 years ago was told he had stage 2 liver disease but has not had follow up since.  Unable to eat and has been constipated.     HISTORY OF PRESENT ILLNESS   (Location/Symptom, Timing/Onset, Context/Setting, Quality, Duration, Modifying Factors, Severity)  Note limiting factors.     History obtained from: the patient and his wife    Enoc Mancia is a 69 y.o. male with history of daily drinking and liver disease he reports for the past 2 weeks he has had abdominal swelling, 15 pound weight gain, and decreased appetite.  Patient has been informed that he has had liver disease from daily drinking in the past but has not followed up with a gastroenterologist.  Patient denies any confusion altered mental status or yellowing of eyes or skin.  Denies any fever.  Denies any trauma.  Denies any abdominal pain although he does report abdominal swelling.  Denies chest pain or respiratory symptoms.      HPI    Nursing Notes were reviewed.    REVIEW OFSYSTEMS    (2-9 systems for level 4, 10 or more for level 5)     Review of Systems   Constitutional:  Positive for appetite change and unexpected weight change. Negative for fever.   HENT:  Negative for facial swelling, trouble swallowing and voice change.    Eyes:  Negative for photophobia and visual disturbance.   Respiratory:  Negative for shortness of breath, wheezing and stridor.    Cardiovascular:  Negative for chest pain and palpitations.   Gastrointestinal:  Positive for abdominal distention. Negative for abdominal pain, blood in stool, nausea and vomiting.   Genitourinary:  Negative for difficulty

## 2025-02-24 NOTE — DISCHARGE INSTRUCTIONS
Please follow-up with both gastroenterology and your primary care doctor for repeat evaluation and nonemergent MRI of your liver.  If develop a fever or uncontrolled pain please come back to the ER.

## 2025-02-25 ENCOUNTER — TELEPHONE (OUTPATIENT)
Dept: FAMILY MEDICINE CLINIC | Age: 70
End: 2025-02-25

## 2025-02-25 NOTE — TELEPHONE ENCOUNTER
Pts wife called and was trying to get appt with pcp due to pt getting discharged from the ED yesterday 2/24 due to alcoholic cirrhosis of the liver. Pts wife stated that they were told to follow up with GI, but the GI doctor couldn't get pt in until March 14. Pts wife stated that he wouldn't make it till then. I then called the GI office and was able to get pt in to be seen earlier on Thursday, Feb 27 at 11:20 at the [facility] [place]. Called and clarified with pts spouse that, that appt would be okay. Pts spouse took the appt and will be seeing GI this week. Advised pts wife to follow up with pcp after his visit with the GI doctor.

## 2025-02-27 DIAGNOSIS — K70.31 ALCOHOLIC CIRRHOSIS OF LIVER WITH ASCITES (HCC): Primary | ICD-10-CM

## 2025-02-28 ENCOUNTER — HOSPITAL ENCOUNTER (OUTPATIENT)
Dept: ULTRASOUND IMAGING | Age: 70
Discharge: HOME | End: 2025-02-28
Payer: MEDICARE

## 2025-02-28 ENCOUNTER — HOSPITAL ENCOUNTER (OUTPATIENT)
Age: 70
Discharge: HOME | End: 2025-02-28
Payer: MEDICARE

## 2025-02-28 DIAGNOSIS — K70.31 ALCOHOLIC CIRRHOSIS OF LIVER WITH ASCITES (HCC): ICD-10-CM

## 2025-02-28 LAB
ALBUMIN FLD-MCNC: 0.6 G/DL
ALBUMIN SERPL-MCNC: 3.1 G/DL (ref 3.4–5)
ALBUMIN/GLOB SERPL: 0.8 {RATIO} (ref 1.1–2.2)
ALP SERPL-CCNC: 159 U/L (ref 40–129)
ALT SERPL-CCNC: 164 U/L (ref 10–40)
ANION GAP SERPL CALCULATED.3IONS-SCNC: 10 MMOL/L (ref 3–16)
APPEARANCE FLUID: CLEAR
AST SERPL-CCNC: 295 U/L (ref 15–37)
BASOPHILS # BLD: 0 K/UL (ref 0–0.2)
BASOPHILS NFR BLD: 0.4 %
BDY FLUID QUALITY: NORMAL
BILIRUB SERPL-MCNC: 1.1 MG/DL (ref 0–1)
BUN SERPL-MCNC: 12 MG/DL (ref 7–20)
CALCIUM SERPL-MCNC: 8.7 MG/DL (ref 8.3–10.6)
CELL COUNT FLUID TYPE: NORMAL
CHLORIDE SERPL-SCNC: 95 MMOL/L (ref 99–110)
CO2 SERPL-SCNC: 21 MMOL/L (ref 21–32)
COLOR FLUID: YELLOW
CREAT SERPL-MCNC: 0.6 MG/DL (ref 0.8–1.3)
DEPRECATED RDW RBC AUTO: 13.7 % (ref 12.4–15.4)
EOSINOPHIL # BLD: 0 K/UL (ref 0–0.6)
EOSINOPHIL NFR BLD: 0.2 %
GFR SERPLBLD CREATININE-BSD FMLA CKD-EPI: >90 ML/MIN/{1.73_M2}
GLUCOSE SERPL-MCNC: 87 MG/DL (ref 70–99)
HCT VFR BLD AUTO: 42.6 % (ref 40.5–52.5)
HGB BLD-MCNC: 14.9 G/DL (ref 13.5–17.5)
INR PPP: 1 (ref 0.85–1.15)
IRON SATN MFR SERPL: 29 % (ref 20–50)
IRON SERPL-MCNC: 81 UG/DL (ref 59–158)
LYMPHOCYTES # BLD: 0.3 K/UL (ref 1–5.1)
LYMPHOCYTES NFR BLD: 4.5 %
LYMPHOCYTES NFR FLD: 4 %
MACROPHAGES # FLD: 86 %
MCH RBC QN AUTO: 36.2 PG (ref 26–34)
MCHC RBC AUTO-ENTMCNC: 35.1 G/DL (ref 31–36)
MCV RBC AUTO: 103 FL (ref 80–100)
MONOCYTES # BLD: 0.3 K/UL (ref 0–1.3)
MONOCYTES NFR BLD: 5.7 %
NEUTROPHIL, FLUID: 10 %
NEUTROPHILS # BLD: 5.1 K/UL (ref 1.7–7.7)
NEUTROPHILS NFR BLD: 89.2 %
NUC CELL # FLD: 207 /CUMM
PLATELET # BLD AUTO: 119 K/UL (ref 135–450)
PMV BLD AUTO: 9.5 FL (ref 5–10.5)
POTASSIUM SERPL-SCNC: 4.8 MMOL/L (ref 3.5–5.1)
PROT SERPL-MCNC: 7.2 G/DL (ref 6.4–8.2)
PROTHROMBIN TIME: 13.4 SEC (ref 11.9–14.9)
RBC # BLD AUTO: 4.13 M/UL (ref 4.2–5.9)
RBC FLUID: 600 /CUMM
SODIUM SERPL-SCNC: 126 MMOL/L (ref 136–145)
SPECIMEN SOURCE FLD: NORMAL
TIBC SERPL-MCNC: 278 UG/DL (ref 260–445)
TOTAL CELLS COUNTED FLD: 100
WBC # BLD AUTO: 5.8 K/UL (ref 4–11)

## 2025-02-28 PROCEDURE — 36415 COLL VENOUS BLD VENIPUNCTURE: CPT

## 2025-02-28 PROCEDURE — 87070 CULTURE OTHR SPECIMN AEROBIC: CPT

## 2025-02-28 PROCEDURE — 85025 COMPLETE CBC W/AUTO DIFF WBC: CPT

## 2025-02-28 PROCEDURE — 80053 COMPREHEN METABOLIC PANEL: CPT

## 2025-02-28 PROCEDURE — 49083 ABD PARACENTESIS W/IMAGING: CPT

## 2025-02-28 PROCEDURE — 87205 SMEAR GRAM STAIN: CPT

## 2025-02-28 PROCEDURE — 85610 PROTHROMBIN TIME: CPT

## 2025-02-28 PROCEDURE — 83550 IRON BINDING TEST: CPT

## 2025-02-28 PROCEDURE — 82042 OTHER SOURCE ALBUMIN QUAN EA: CPT

## 2025-02-28 PROCEDURE — 83540 ASSAY OF IRON: CPT

## 2025-02-28 PROCEDURE — 82105 ALPHA-FETOPROTEIN SERUM: CPT

## 2025-02-28 PROCEDURE — 89051 BODY FLUID CELL COUNT: CPT

## 2025-03-02 LAB
BACTERIA FLD AEROBE CULT: NORMAL
GRAM STN SPEC: NORMAL

## 2025-03-03 LAB
BACTERIA FLD AEROBE CULT: NORMAL
GRAM STN SPEC: NORMAL

## 2025-03-04 LAB — AFP-TM SERPL-MCNC: 1.9 UG/L

## 2025-03-06 ENCOUNTER — APPOINTMENT (OUTPATIENT)
Dept: GENERAL RADIOLOGY | Age: 70
DRG: 433 | End: 2025-03-06
Payer: MEDICARE

## 2025-03-06 ENCOUNTER — APPOINTMENT (OUTPATIENT)
Dept: CT IMAGING | Age: 70
DRG: 433 | End: 2025-03-06
Payer: MEDICARE

## 2025-03-06 ENCOUNTER — HOSPITAL ENCOUNTER (INPATIENT)
Age: 70
LOS: 1 days | Discharge: HOME | DRG: 433 | End: 2025-03-07
Attending: EMERGENCY MEDICINE | Admitting: INTERNAL MEDICINE
Payer: MEDICARE

## 2025-03-06 DIAGNOSIS — K74.60 CIRRHOSIS OF LIVER (HCC): ICD-10-CM

## 2025-03-06 DIAGNOSIS — I31.39 PERICARDIAL EFFUSION: ICD-10-CM

## 2025-03-06 DIAGNOSIS — K70.31 ASCITES DUE TO ALCOHOLIC CIRRHOSIS (HCC): Primary | ICD-10-CM

## 2025-03-06 PROBLEM — F10.10 ALCOHOL ABUSE: Status: ACTIVE | Noted: 2025-03-06

## 2025-03-06 LAB
ALBUMIN SERPL-MCNC: 2.6 G/DL (ref 3.4–5)
ALBUMIN/GLOB SERPL: 0.7 {RATIO} (ref 1.1–2.2)
ALP SERPL-CCNC: 163 U/L (ref 40–129)
ALT SERPL-CCNC: 135 U/L (ref 10–40)
ANION GAP SERPL CALCULATED.3IONS-SCNC: 9 MMOL/L (ref 3–16)
AST SERPL-CCNC: 277 U/L (ref 15–37)
BASOPHILS # BLD: 0 K/UL (ref 0–0.2)
BASOPHILS NFR BLD: 0.2 %
BILIRUB SERPL-MCNC: 1 MG/DL (ref 0–1)
BUN SERPL-MCNC: <2 MG/DL (ref 7–20)
CALCIUM SERPL-MCNC: 7.9 MG/DL (ref 8.3–10.6)
CHLORIDE SERPL-SCNC: 100 MMOL/L (ref 99–110)
CO2 SERPL-SCNC: 21 MMOL/L (ref 21–32)
CREAT SERPL-MCNC: 0.6 MG/DL (ref 0.8–1.3)
DEPRECATED RDW RBC AUTO: 13.7 % (ref 12.4–15.4)
DEPRECATED RDW RBC AUTO: 13.8 % (ref 12.4–15.4)
EOSINOPHIL # BLD: 0 K/UL (ref 0–0.6)
EOSINOPHIL NFR BLD: 0.1 %
FLUAV RNA RESP QL NAA+PROBE: NOT DETECTED
FLUBV RNA RESP QL NAA+PROBE: NOT DETECTED
GFR SERPLBLD CREATININE-BSD FMLA CKD-EPI: >90 ML/MIN/{1.73_M2}
GLUCOSE SERPL-MCNC: 80 MG/DL (ref 70–99)
HCT VFR BLD AUTO: 41.1 % (ref 40.5–52.5)
HCT VFR BLD AUTO: 42.5 % (ref 40.5–52.5)
HGB BLD-MCNC: 14.2 G/DL (ref 13.5–17.5)
HGB BLD-MCNC: 14.7 G/DL (ref 13.5–17.5)
INR PPP: 1.04 (ref 0.85–1.15)
INR PPP: 1.04 (ref 0.85–1.15)
LACTATE BLDV-SCNC: 1.3 MMOL/L (ref 0.4–2)
LIPASE SERPL-CCNC: 93 U/L (ref 13–60)
LYMPHOCYTES # BLD: 0.4 K/UL (ref 1–5.1)
LYMPHOCYTES NFR BLD: 6.2 %
MCH RBC QN AUTO: 35.7 PG (ref 26–34)
MCH RBC QN AUTO: 35.7 PG (ref 26–34)
MCHC RBC AUTO-ENTMCNC: 34.5 G/DL (ref 31–36)
MCHC RBC AUTO-ENTMCNC: 34.7 G/DL (ref 31–36)
MCV RBC AUTO: 103 FL (ref 80–100)
MCV RBC AUTO: 103.6 FL (ref 80–100)
MONOCYTES # BLD: 0.4 K/UL (ref 0–1.3)
MONOCYTES NFR BLD: 6.4 %
NEUTROPHILS # BLD: 5.9 K/UL (ref 1.7–7.7)
NEUTROPHILS NFR BLD: 87.1 %
PLATELET # BLD AUTO: 112 K/UL (ref 135–450)
PLATELET # BLD AUTO: 120 K/UL (ref 135–450)
PMV BLD AUTO: 9 FL (ref 5–10.5)
PMV BLD AUTO: 9.7 FL (ref 5–10.5)
POTASSIUM SERPL-SCNC: 4.2 MMOL/L (ref 3.5–5.1)
PROT SERPL-MCNC: 6.5 G/DL (ref 6.4–8.2)
PROTHROMBIN TIME: 13.8 SEC (ref 11.9–14.9)
PROTHROMBIN TIME: 13.8 SEC (ref 11.9–14.9)
RBC # BLD AUTO: 3.97 M/UL (ref 4.2–5.9)
RBC # BLD AUTO: 4.12 M/UL (ref 4.2–5.9)
SARS-COV-2 RNA RESP QL NAA+PROBE: NOT DETECTED
SODIUM SERPL-SCNC: 130 MMOL/L (ref 136–145)
TROPONIN, HIGH SENSITIVITY: 10 NG/L (ref 0–22)
WBC # BLD AUTO: 6.8 K/UL (ref 4–11)
WBC # BLD AUTO: 7.2 K/UL (ref 4–11)

## 2025-03-06 PROCEDURE — 74177 CT ABD & PELVIS W/CONTRAST: CPT

## 2025-03-06 PROCEDURE — 0W9G3ZZ DRAINAGE OF PERITONEAL CAVITY, PERCUTANEOUS APPROACH: ICD-10-PCS | Performed by: INTERNAL MEDICINE

## 2025-03-06 PROCEDURE — 85610 PROTHROMBIN TIME: CPT

## 2025-03-06 PROCEDURE — 85025 COMPLETE CBC W/AUTO DIFF WBC: CPT

## 2025-03-06 PROCEDURE — 6360000004 HC RX CONTRAST MEDICATION: Performed by: EMERGENCY MEDICINE

## 2025-03-06 PROCEDURE — 80053 COMPREHEN METABOLIC PANEL: CPT

## 2025-03-06 PROCEDURE — 84484 ASSAY OF TROPONIN QUANT: CPT

## 2025-03-06 PROCEDURE — 83605 ASSAY OF LACTIC ACID: CPT

## 2025-03-06 PROCEDURE — 83690 ASSAY OF LIPASE: CPT

## 2025-03-06 PROCEDURE — 2500000003 HC RX 250 WO HCPCS

## 2025-03-06 PROCEDURE — 6370000000 HC RX 637 (ALT 250 FOR IP)

## 2025-03-06 PROCEDURE — 99223 1ST HOSP IP/OBS HIGH 75: CPT

## 2025-03-06 PROCEDURE — 99285 EMERGENCY DEPT VISIT HI MDM: CPT

## 2025-03-06 PROCEDURE — 93005 ELECTROCARDIOGRAM TRACING: CPT | Performed by: EMERGENCY MEDICINE

## 2025-03-06 PROCEDURE — 87636 SARSCOV2 & INF A&B AMP PRB: CPT

## 2025-03-06 PROCEDURE — 85027 COMPLETE CBC AUTOMATED: CPT

## 2025-03-06 PROCEDURE — 36415 COLL VENOUS BLD VENIPUNCTURE: CPT

## 2025-03-06 PROCEDURE — 71045 X-RAY EXAM CHEST 1 VIEW: CPT

## 2025-03-06 PROCEDURE — 1200000000 HC SEMI PRIVATE

## 2025-03-06 RX ORDER — PANTOPRAZOLE SODIUM 40 MG/1
40 TABLET, DELAYED RELEASE ORAL
Status: DISCONTINUED | OUTPATIENT
Start: 2025-03-07 | End: 2025-03-07 | Stop reason: HOSPADM

## 2025-03-06 RX ORDER — LANOLIN ALCOHOL/MO/W.PET/CERES
100 CREAM (GRAM) TOPICAL DAILY
Status: DISCONTINUED | OUTPATIENT
Start: 2025-03-07 | End: 2025-03-07 | Stop reason: HOSPADM

## 2025-03-06 RX ORDER — OXYCODONE HYDROCHLORIDE 5 MG/1
5 TABLET ORAL EVERY 6 HOURS PRN
Status: DISCONTINUED | OUTPATIENT
Start: 2025-03-06 | End: 2025-03-07 | Stop reason: HOSPADM

## 2025-03-06 RX ORDER — LORAZEPAM 2 MG/ML
1 INJECTION INTRAMUSCULAR
Status: DISCONTINUED | OUTPATIENT
Start: 2025-03-06 | End: 2025-03-07 | Stop reason: HOSPADM

## 2025-03-06 RX ORDER — MAGNESIUM SULFATE 1 G/100ML
1000 INJECTION INTRAVENOUS PRN
Status: DISCONTINUED | OUTPATIENT
Start: 2025-03-06 | End: 2025-03-07 | Stop reason: HOSPADM

## 2025-03-06 RX ORDER — LORAZEPAM 2 MG/1
4 TABLET ORAL
Status: DISCONTINUED | OUTPATIENT
Start: 2025-03-06 | End: 2025-03-07 | Stop reason: HOSPADM

## 2025-03-06 RX ORDER — POLYETHYLENE GLYCOL 3350 17 G/17G
17 POWDER, FOR SOLUTION ORAL DAILY PRN
Status: DISCONTINUED | OUTPATIENT
Start: 2025-03-06 | End: 2025-03-07 | Stop reason: HOSPADM

## 2025-03-06 RX ORDER — ASPIRIN 81 MG/1
81 TABLET, CHEWABLE ORAL DAILY
Status: DISCONTINUED | OUTPATIENT
Start: 2025-03-07 | End: 2025-03-07 | Stop reason: HOSPADM

## 2025-03-06 RX ORDER — MULTIVITAMIN WITH IRON
500 TABLET ORAL DAILY
Status: DISCONTINUED | OUTPATIENT
Start: 2025-03-07 | End: 2025-03-07 | Stop reason: HOSPADM

## 2025-03-06 RX ORDER — POTASSIUM CHLORIDE 1500 MG/1
40 TABLET, EXTENDED RELEASE ORAL PRN
Status: DISCONTINUED | OUTPATIENT
Start: 2025-03-06 | End: 2025-03-07 | Stop reason: HOSPADM

## 2025-03-06 RX ORDER — POTASSIUM CHLORIDE 7.45 MG/ML
10 INJECTION INTRAVENOUS PRN
Status: DISCONTINUED | OUTPATIENT
Start: 2025-03-06 | End: 2025-03-07 | Stop reason: HOSPADM

## 2025-03-06 RX ORDER — ACETAMINOPHEN 325 MG/1
650 TABLET ORAL EVERY 6 HOURS PRN
Status: CANCELLED | OUTPATIENT
Start: 2025-03-06

## 2025-03-06 RX ORDER — SODIUM CHLORIDE 9 MG/ML
INJECTION, SOLUTION INTRAVENOUS PRN
Status: DISCONTINUED | OUTPATIENT
Start: 2025-03-06 | End: 2025-03-07 | Stop reason: HOSPADM

## 2025-03-06 RX ORDER — FINASTERIDE 5 MG/1
5 TABLET, FILM COATED ORAL DAILY
Status: DISCONTINUED | OUTPATIENT
Start: 2025-03-07 | End: 2025-03-07 | Stop reason: HOSPADM

## 2025-03-06 RX ORDER — LORAZEPAM 2 MG/ML
3 INJECTION INTRAMUSCULAR
Status: DISCONTINUED | OUTPATIENT
Start: 2025-03-06 | End: 2025-03-07 | Stop reason: HOSPADM

## 2025-03-06 RX ORDER — MULTIVITAMIN WITH IRON
1 TABLET ORAL DAILY
Status: DISCONTINUED | OUTPATIENT
Start: 2025-03-07 | End: 2025-03-07 | Stop reason: HOSPADM

## 2025-03-06 RX ORDER — ONDANSETRON 2 MG/ML
4 INJECTION INTRAMUSCULAR; INTRAVENOUS EVERY 6 HOURS PRN
Status: DISCONTINUED | OUTPATIENT
Start: 2025-03-06 | End: 2025-03-07 | Stop reason: HOSPADM

## 2025-03-06 RX ORDER — LORAZEPAM 2 MG/ML
4 INJECTION INTRAMUSCULAR
Status: DISCONTINUED | OUTPATIENT
Start: 2025-03-06 | End: 2025-03-07 | Stop reason: HOSPADM

## 2025-03-06 RX ORDER — ATORVASTATIN CALCIUM 10 MG/1
10 TABLET, FILM COATED ORAL DAILY
Status: DISCONTINUED | OUTPATIENT
Start: 2025-03-07 | End: 2025-03-07 | Stop reason: HOSPADM

## 2025-03-06 RX ORDER — LORAZEPAM 2 MG/1
2 TABLET ORAL
Status: DISCONTINUED | OUTPATIENT
Start: 2025-03-06 | End: 2025-03-07 | Stop reason: HOSPADM

## 2025-03-06 RX ORDER — SODIUM CHLORIDE 0.9 % (FLUSH) 0.9 %
10 SYRINGE (ML) INJECTION PRN
Status: DISCONTINUED | OUTPATIENT
Start: 2025-03-06 | End: 2025-03-07 | Stop reason: HOSPADM

## 2025-03-06 RX ORDER — METOPROLOL SUCCINATE 25 MG/1
25 TABLET, EXTENDED RELEASE ORAL NIGHTLY
Status: DISCONTINUED | OUTPATIENT
Start: 2025-03-06 | End: 2025-03-07 | Stop reason: HOSPADM

## 2025-03-06 RX ORDER — SODIUM CHLORIDE 0.9 % (FLUSH) 0.9 %
5-40 SYRINGE (ML) INJECTION EVERY 12 HOURS SCHEDULED
Status: DISCONTINUED | OUTPATIENT
Start: 2025-03-06 | End: 2025-03-07 | Stop reason: HOSPADM

## 2025-03-06 RX ORDER — ENOXAPARIN SODIUM 100 MG/ML
40 INJECTION SUBCUTANEOUS DAILY
Status: DISCONTINUED | OUTPATIENT
Start: 2025-03-07 | End: 2025-03-07 | Stop reason: HOSPADM

## 2025-03-06 RX ORDER — AMLODIPINE BESYLATE 5 MG/1
5 TABLET ORAL DAILY
Status: DISCONTINUED | OUTPATIENT
Start: 2025-03-07 | End: 2025-03-07 | Stop reason: HOSPADM

## 2025-03-06 RX ORDER — IOPAMIDOL 755 MG/ML
75 INJECTION, SOLUTION INTRAVASCULAR
Status: COMPLETED | OUTPATIENT
Start: 2025-03-06 | End: 2025-03-06

## 2025-03-06 RX ORDER — LORAZEPAM 2 MG/ML
2 INJECTION INTRAMUSCULAR
Status: DISCONTINUED | OUTPATIENT
Start: 2025-03-06 | End: 2025-03-07 | Stop reason: HOSPADM

## 2025-03-06 RX ORDER — TAMSULOSIN HYDROCHLORIDE 0.4 MG/1
0.4 CAPSULE ORAL DAILY
Status: DISCONTINUED | OUTPATIENT
Start: 2025-03-07 | End: 2025-03-07 | Stop reason: HOSPADM

## 2025-03-06 RX ORDER — ACETAMINOPHEN 650 MG/1
650 SUPPOSITORY RECTAL EVERY 6 HOURS PRN
Status: CANCELLED | OUTPATIENT
Start: 2025-03-06

## 2025-03-06 RX ORDER — SODIUM CHLORIDE 0.9 % (FLUSH) 0.9 %
5-40 SYRINGE (ML) INJECTION PRN
Status: DISCONTINUED | OUTPATIENT
Start: 2025-03-06 | End: 2025-03-07 | Stop reason: HOSPADM

## 2025-03-06 RX ORDER — ONDANSETRON 4 MG/1
4 TABLET, ORALLY DISINTEGRATING ORAL EVERY 8 HOURS PRN
Status: DISCONTINUED | OUTPATIENT
Start: 2025-03-06 | End: 2025-03-07 | Stop reason: HOSPADM

## 2025-03-06 RX ORDER — LORAZEPAM 1 MG/1
1 TABLET ORAL
Status: DISCONTINUED | OUTPATIENT
Start: 2025-03-06 | End: 2025-03-07 | Stop reason: HOSPADM

## 2025-03-06 RX ADMIN — METOPROLOL SUCCINATE 25 MG: 25 TABLET, EXTENDED RELEASE ORAL at 22:26

## 2025-03-06 RX ADMIN — LISINOPRIL 15 MG: 5 TABLET ORAL at 22:26

## 2025-03-06 RX ADMIN — SODIUM CHLORIDE, PRESERVATIVE FREE 10 ML: 5 INJECTION INTRAVENOUS at 22:27

## 2025-03-06 RX ADMIN — IOPAMIDOL 75 ML: 755 INJECTION, SOLUTION INTRAVENOUS at 15:10

## 2025-03-06 ASSESSMENT — PAIN SCALES - GENERAL: PAINLEVEL_OUTOF10: 5

## 2025-03-06 ASSESSMENT — PAIN DESCRIPTION - LOCATION: LOCATION: ABDOMEN

## 2025-03-06 ASSESSMENT — PAIN - FUNCTIONAL ASSESSMENT: PAIN_FUNCTIONAL_ASSESSMENT: 0-10

## 2025-03-06 NOTE — H&P
Hospital Medicine History & Physical      PCP: Sofia Whitt, APRN - CNP    Date of Admission: 3/6/2025    Date of Service: Pt seen/examined on 3/6/2025     Chief Complaint:    Chief Complaint   Patient presents with    Bloated     Has cirrhosis diagnosed last week. Had paracentesis and they took off 2.5 liters    Abdominal Pain    Nausea         History Of Present Illness:      The patient is a 69 y.o. male with pmhx of alcohol abuse, RA, HTN, liver disease who presented to [facility] ED with complaint of abdominal discomfort. Patient was seen in ER last week and given GI referral actually had paracentesis done with 2.5 L removed. Has not seen GI yet. Past couple days has noticed abdominal distension and swelling which is causing discomfort. He is also getting full very quickly 2/2 to the distension. +Constipation but did have small bowel movement this morning. Denies fever, chills, chest pain, shortness of breath.   Has not noticed lower extremity swelling.   Was formerly drinking 12 beers daily but has cut back the past week and has only had 1-2 beers every couple days.       Past Medical History:        Diagnosis Date    Acid reflux     Arthritis     rheumatoid    Cirrhosis (HCC)     Depression     Enlarged prostate     Hearing decreased     does not wear his hearing aid    Hyperlipidemia     no meds    Hypertension     Liver disease     Stage 2 per pt     Muscle pain     Rheumatoid arthritis (HCC)     Seizure (HCC) 01/22/2025       Past Surgical History:        Procedure Laterality Date    APPENDECTOMY      CARDIAC CATHETERIZATION      COLONOSCOPY  7/25/2013    polyps    COLONOSCOPY  01/18/2021    COLONOSCOPY N/A 1/18/2021    COLON W/ANES. (9:00) **FIBROSCAN TOO** performed by Valeriy Talley MD at [facility] ENDOSCOPY    CYSTOSCOPY      OTHER SURGICAL HISTORY  09/27/2012    muscle biopsy left shoulder    UPPER GASTROINTESTINAL ENDOSCOPY  7/25/2013    dysphagia    WRIST SURGERY      left  PO) Take 500 mcg by mouth daily   Yes Provider, MD Kiera   vitamin B-12 (CYANOCOBALAMIN) 500 MCG tablet Take 1 tablet by mouth daily   Yes Provider, MD Kiera   polycarbophil (FIBERCON) 625 MG tablet Take 1 tablet by mouth daily   Yes ProviderKiera MD   aspirin 81 MG chewable tablet Take 1 tablet by mouth daily   Yes Provider, MD Kiera   sulindac (CLINORIL) 150 MG tablet TAKE 1 TABLET BY MOUTH 2 TIMES DAILY.  Patient not taking: Reported on 3/6/2025 1/23/24   Sofia Whitt, APRN - CNP       Allergies:  Codeine and Pcn [penicillins]    Social History:      TOBACCO:   reports that he quit smoking about 31 years ago. His smoking use included cigarettes. He started smoking about 46 years ago. He has a 30 pack-year smoking history. His smokeless tobacco use includes snuff.  ETOH:   reports current alcohol use of about 30.0 - 35.0 standard drinks of alcohol per week.      Family History:   Positive as follows:        Problem Relation Age of Onset    Cancer Mother         lung    Stroke Mother         at age 21    Diabetes Mother     Heart Disease Father     High Blood Pressure Father        REVIEW OF SYSTEMS:       Constitutional: Negative for fever   HENT: Negative for sore throat   Eyes: Negative for redness   Respiratory: Negative  for dyspnea, cough   Cardiovascular: Negative for chest pain   Gastrointestinal: Negative for vomiting, diarrhea +abdominal bloating, constipation   Genitourinary: Negative for hematuria   Musculoskeletal: Negative for arthralgias   Skin: Negative for rash   Neurological: Negative for syncope   Hematological: Negative for adenopathy   Psychiatric/Behavorial: Negative for anxiety    PHYSICAL EXAM:    /88   Pulse 84   Temp 97.9 °F (36.6 °C) (Oral)   Resp 17   Wt 77.6 kg (171 lb 1.2 oz)   SpO2 93%   BMI 24.55 kg/m²     Gen: No distress. Alert. +Pleasant male   Eyes: PERRL. No sclera icterus. No conjunctival injection.   Neck: No JVD.  No Carotid Bruit.

## 2025-03-06 NOTE — ED PROVIDER NOTES
EMERGENCY MEDICINE PROVIDER NOTE    Patient Identification  Pt Name: Enoc Mancia  MRN: [id number]  Birthdate 1955  Date of evaluation: 3/6/2025  Provider: Quincy Silva MD  PCP: Sofia Whitt, APRN - CNP    Chief Complaint  Bloated (Has cirrhosis diagnosed last week. Had paracentesis and they took off 2.5 liters), Abdominal Pain, and Nausea      HPI  (History provided by {Persons; family members:[id number]})  This is a 69 y.o. male who was brought in by {EMS:[id number]} for ***.     I have reviewed the following nursing documentation:  Allergies: Codeine and Pcn [penicillins]    Past medical history:   Past Medical History:   Diagnosis Date    Acid reflux     Arthritis     rheumatoid    Cirrhosis (HCC)     Depression     Enlarged prostate     Hearing decreased     does not wear his hearing aid    Hyperlipidemia     no meds    Hypertension     Liver disease     Stage 2 per pt     Muscle pain     Rheumatoid arthritis (HCC)     Seizure (HCC) 01/22/2025     Past surgical history:   Past Surgical History:   Procedure Laterality Date    APPENDECTOMY      CARDIAC CATHETERIZATION      COLONOSCOPY  7/25/2013    polyps    COLONOSCOPY  01/18/2021    COLONOSCOPY N/A 1/18/2021    COLON W/ANES. (9:00) **FIBROSCAN TOO** performed by Valeriy Talley MD at [facility] ENDOSCOPY    CYSTOSCOPY      OTHER SURGICAL HISTORY  09/27/2012    muscle biopsy left shoulder    UPPER GASTROINTESTINAL ENDOSCOPY  7/25/2013    dysphagia    WRIST SURGERY      left     Social history:  reports that he quit smoking about 31 years ago. His smoking use included cigarettes. He started smoking about 46 years ago. He has a 30 pack-year smoking history. His smokeless tobacco use includes snuff. He reports current alcohol use of about 30.0 - 35.0 standard drinks of alcohol per week. He reports current drug use. Drug: Marijuana (Weed).  Family history:    Family History   Problem Relation Age of Onset    Cancer Mother         lung    Stroke Mother   care   has a past medical history of Acid reflux, Arthritis, Cirrhosis (HCC), Depression, Enlarged prostate, Hearing decreased, Hyperlipidemia, Hypertension, Liver disease, Muscle pain, Rheumatoid arthritis (HCC), and Seizure (HCC) (01/22/2025).    MDM and ED Course  Stable during course.  Vital signs are normal.  Afebrile.  On exam, patient has moderate abdominal distention, ascitic abdomen consistent with known history of alcoholic cirrhosis.  Labs remarkable for sodium 130 (126 last week), platelets 120 (119 1 week ago), albumin 2.6, recent paracentesis, transaminitis that appears stable with , , alkaline phosphatase 166, lipase 93 (111 2 weeks ago), bilirubin normal UA.  Otherwise within normal limits.  Lactate, INR normal. CXR shows platelike atelectasis or scarring in the right lung base.  CT abdomen pelvis with contrast shows:  1. Moderate ascites.  2. Evidence of cirrhosis and significant hepatic synthetic dysfunction with  diffuse gastrointestinal track edematous changes.  3. Stable 1.2 cm hepatic dome oval circumscribed hypodense mass compared to prior CT 2012 consistent with a benign process.    On chart review, PMN count 207 on prior therapeutic centesis 2/28/2025.  Do have low suspicion for SBP currently given his recent paracentesis results, lack of infectious symptoms or abdominal tenderness on exam.  At this time feel the patient will require admission for repeat therapeutic centesis given his current symptoms with recurrence.  Discussed findings and plan of care with patient and family at bedside expressed understanding and agreement with plan.  Discussed patient with hospitalist service.  Patient admitted to hospitalist service.      Final Impression  1. Ascites due to alcoholic cirrhosis (HCC)        Blood pressure 121/88, pulse 84, temperature 97.9 °F (36.6 °C), temperature source Oral, resp. rate 17, weight 77.6 kg (171 lb 1.2 oz), SpO2 93%.     Disposition:  DISPOSITION Admitted

## 2025-03-07 ENCOUNTER — APPOINTMENT (OUTPATIENT)
Age: 70
DRG: 433 | End: 2025-03-07
Payer: MEDICARE

## 2025-03-07 ENCOUNTER — APPOINTMENT (OUTPATIENT)
Dept: ULTRASOUND IMAGING | Age: 70
DRG: 433 | End: 2025-03-07
Payer: MEDICARE

## 2025-03-07 VITALS
HEIGHT: 70 IN | OXYGEN SATURATION: 95 % | HEART RATE: 83 BPM | SYSTOLIC BLOOD PRESSURE: 115 MMHG | DIASTOLIC BLOOD PRESSURE: 78 MMHG | WEIGHT: 173 LBS | BODY MASS INDEX: 24.77 KG/M2 | TEMPERATURE: 97.5 F | RESPIRATION RATE: 14 BRPM

## 2025-03-07 PROBLEM — I31.39 PERICARDIAL EFFUSION: Status: ACTIVE | Noted: 2025-03-07

## 2025-03-07 LAB
ALBUMIN FLD-MCNC: 0.5 G/DL
ALBUMIN SERPL-MCNC: 2.4 G/DL (ref 3.4–5)
ALP SERPL-CCNC: 143 U/L (ref 40–129)
ALT SERPL-CCNC: 116 U/L (ref 10–40)
ANION GAP SERPL CALCULATED.3IONS-SCNC: 8 MMOL/L (ref 3–16)
APPEARANCE FLUID: NORMAL
AST SERPL-CCNC: 243 U/L (ref 15–37)
BACTERIA URNS QL MICRO: ABNORMAL /HPF
BASOPHILS # BLD: 0 K/UL (ref 0–0.2)
BASOPHILS NFR BLD: 0.5 %
BDY FLUID QUALITY: NORMAL
BILIRUB DIRECT SERPL-MCNC: 0.7 MG/DL (ref 0–0.3)
BILIRUB INDIRECT SERPL-MCNC: 0.2 MG/DL (ref 0–1)
BILIRUB SERPL-MCNC: 0.9 MG/DL (ref 0–1)
BILIRUB UR QL STRIP.AUTO: ABNORMAL
BUN SERPL-MCNC: <2 MG/DL (ref 7–20)
CALCIUM SERPL-MCNC: 7.7 MG/DL (ref 8.3–10.6)
CELL COUNT FLUID TYPE: NORMAL
CHLORIDE SERPL-SCNC: 103 MMOL/L (ref 99–110)
CLARITY UR: CLEAR
CO2 SERPL-SCNC: 21 MMOL/L (ref 21–32)
COLOR FLUID: YELLOW
COLOR UR: ABNORMAL
CREAT SERPL-MCNC: 0.6 MG/DL (ref 0.8–1.3)
DEPRECATED RDW RBC AUTO: 13.7 % (ref 12.4–15.4)
ECHO AO ROOT DIAM: 3.5 CM
ECHO AO ROOT INDEX: 1.79 CM/M2
ECHO AV CUSP MM: 1.7 CM
ECHO AV PEAK GRADIENT: 4 MMHG
ECHO AV PEAK VELOCITY: 1 M/S
ECHO BSA: 1.97 M2
ECHO LA DIAMETER INDEX: 1.73 CM/M2
ECHO LA DIAMETER: 3.4 CM
ECHO LA TO AORTIC ROOT RATIO: 0.97
ECHO LV E' LATERAL VELOCITY: 9.46 CM/S
ECHO LV E' SEPTAL VELOCITY: 6.64 CM/S
ECHO LV EF PHYSICIAN: 58 %
ECHO LV FRACTIONAL SHORTENING: 22 % (ref 28–44)
ECHO LV INTERNAL DIMENSION DIASTOLE INDEX: 2.35 CM/M2
ECHO LV INTERNAL DIMENSION DIASTOLIC: 4.6 CM (ref 4.2–5.9)
ECHO LV INTERNAL DIMENSION SYSTOLIC INDEX: 1.84 CM/M2
ECHO LV INTERNAL DIMENSION SYSTOLIC: 3.6 CM
ECHO LV ISOVOLUMETRIC RELAXATION TIME (IVRT): 63 MS
ECHO LV IVSD: 1 CM (ref 0.6–1)
ECHO LV MASS 2D: 169.9 G (ref 88–224)
ECHO LV MASS INDEX 2D: 86.7 G/M2 (ref 49–115)
ECHO LV POSTERIOR WALL DIASTOLIC: 1.1 CM (ref 0.6–1)
ECHO LV RELATIVE WALL THICKNESS RATIO: 0.48
ECHO MV A VELOCITY: 0.42 M/S
ECHO MV E VELOCITY: 0.53 M/S
ECHO MV E/A RATIO: 1.26
ECHO MV E/E' LATERAL: 5.6
ECHO MV E/E' RATIO (AVERAGED): 6.79
ECHO MV E/E' SEPTAL: 7.98
ECHO PV MAX VELOCITY: 0.5 M/S
ECHO PV PEAK GRADIENT: 1 MMHG
ECHO RV FREE WALL PEAK S': 12.6 CM/S
ECHO RV TAPSE: 2.4 CM (ref 1.7–?)
ECHO TV PEAK GRADIENT: 2 MMHG
EKG ATRIAL RATE: 90 BPM
EKG DIAGNOSIS: NORMAL
EKG P AXIS: 24 DEGREES
EKG P-R INTERVAL: 156 MS
EKG Q-T INTERVAL: 380 MS
EKG QRS DURATION: 84 MS
EKG QTC CALCULATION (BAZETT): 464 MS
EKG R AXIS: 63 DEGREES
EKG T AXIS: 67 DEGREES
EKG VENTRICULAR RATE: 90 BPM
EOSINOPHIL # BLD: 0 K/UL (ref 0–0.6)
EOSINOPHIL NFR BLD: 0.3 %
EPI CELLS #/AREA URNS HPF: ABNORMAL /HPF (ref 0–5)
GFR SERPLBLD CREATININE-BSD FMLA CKD-EPI: >90 ML/MIN/{1.73_M2}
GLUCOSE SERPL-MCNC: 64 MG/DL (ref 70–99)
GLUCOSE UR STRIP.AUTO-MCNC: NEGATIVE MG/DL
HCT VFR BLD AUTO: 40.6 % (ref 40.5–52.5)
HGB BLD-MCNC: 14.2 G/DL (ref 13.5–17.5)
HGB UR QL STRIP.AUTO: NEGATIVE
KETONES UR STRIP.AUTO-MCNC: ABNORMAL MG/DL
LEUKOCYTE ESTERASE UR QL STRIP.AUTO: NEGATIVE
LYMPHOCYTES # BLD: 0.4 K/UL (ref 1–5.1)
LYMPHOCYTES NFR BLD: 7 %
LYMPHOCYTES NFR FLD: 10 %
MACROPHAGES # FLD: 69 %
MCH RBC QN AUTO: 36.3 PG (ref 26–34)
MCHC RBC AUTO-ENTMCNC: 34.9 G/DL (ref 31–36)
MCV RBC AUTO: 104 FL (ref 80–100)
MESOTHL CELL NFR FLD: 3 %
MONOCYTES # BLD: 0.4 K/UL (ref 0–1.3)
MONOCYTES NFR BLD: 6.3 %
MUCOUS THREADS #/AREA URNS LPF: ABNORMAL /LPF
NEUTROPHIL, FLUID: 18 %
NEUTROPHILS # BLD: 5 K/UL (ref 1.7–7.7)
NEUTROPHILS NFR BLD: 85.9 %
NITRITE UR QL STRIP.AUTO: NEGATIVE
NUC CELL # FLD: 185 /CUMM
PH UR STRIP.AUTO: 6.5 [PH] (ref 5–8)
PLATELET # BLD AUTO: 100 K/UL (ref 135–450)
PMV BLD AUTO: 9.1 FL (ref 5–10.5)
POTASSIUM SERPL-SCNC: 4 MMOL/L (ref 3.5–5.1)
PROT SERPL-MCNC: 5.9 G/DL (ref 6.4–8.2)
PROT UR STRIP.AUTO-MCNC: ABNORMAL MG/DL
RBC # BLD AUTO: 3.9 M/UL (ref 4.2–5.9)
RBC #/AREA URNS HPF: ABNORMAL /HPF (ref 0–4)
RBC FLUID: 1500 /CUMM
SODIUM SERPL-SCNC: 132 MMOL/L (ref 136–145)
SP GR UR STRIP.AUTO: 1.02 (ref 1–1.03)
SPECIMEN SOURCE FLD: NORMAL
TOTAL CELLS COUNTED FLD: 100
UA COMPLETE W REFLEX CULTURE PNL UR: ABNORMAL
UA DIPSTICK W REFLEX MICRO PNL UR: YES
URN SPEC COLLECT METH UR: ABNORMAL
UROBILINOGEN UR STRIP-ACNC: 4 E.U./DL
WBC # BLD AUTO: 5.9 K/UL (ref 4–11)
WBC #/AREA URNS HPF: ABNORMAL /HPF (ref 0–5)

## 2025-03-07 PROCEDURE — 88305 TISSUE EXAM BY PATHOLOGIST: CPT

## 2025-03-07 PROCEDURE — 82042 OTHER SOURCE ALBUMIN QUAN EA: CPT

## 2025-03-07 PROCEDURE — 2500000003 HC RX 250 WO HCPCS

## 2025-03-07 PROCEDURE — 36415 COLL VENOUS BLD VENIPUNCTURE: CPT

## 2025-03-07 PROCEDURE — 93306 TTE W/DOPPLER COMPLETE: CPT

## 2025-03-07 PROCEDURE — 6370000000 HC RX 637 (ALT 250 FOR IP): Performed by: INTERNAL MEDICINE

## 2025-03-07 PROCEDURE — 80048 BASIC METABOLIC PNL TOTAL CA: CPT

## 2025-03-07 PROCEDURE — 6370000000 HC RX 637 (ALT 250 FOR IP)

## 2025-03-07 PROCEDURE — 85025 COMPLETE CBC W/AUTO DIFF WBC: CPT

## 2025-03-07 PROCEDURE — 93010 ELECTROCARDIOGRAM REPORT: CPT | Performed by: INTERNAL MEDICINE

## 2025-03-07 PROCEDURE — 87205 SMEAR GRAM STAIN: CPT

## 2025-03-07 PROCEDURE — 80076 HEPATIC FUNCTION PANEL: CPT

## 2025-03-07 PROCEDURE — 87070 CULTURE OTHR SPECIMN AEROBIC: CPT

## 2025-03-07 PROCEDURE — 81001 URINALYSIS AUTO W/SCOPE: CPT

## 2025-03-07 PROCEDURE — 99238 HOSP IP/OBS DSCHRG MGMT 30/<: CPT | Performed by: INTERNAL MEDICINE

## 2025-03-07 PROCEDURE — 88112 CYTOPATH CELL ENHANCE TECH: CPT

## 2025-03-07 PROCEDURE — 93306 TTE W/DOPPLER COMPLETE: CPT | Performed by: INTERNAL MEDICINE

## 2025-03-07 PROCEDURE — 49083 ABD PARACENTESIS W/IMAGING: CPT

## 2025-03-07 PROCEDURE — 89051 BODY FLUID CELL COUNT: CPT

## 2025-03-07 RX ORDER — SPIRONOLACTONE 25 MG/1
50 TABLET ORAL DAILY
Status: DISCONTINUED | OUTPATIENT
Start: 2025-03-07 | End: 2025-03-07 | Stop reason: HOSPADM

## 2025-03-07 RX ORDER — SPIRONOLACTONE 50 MG/1
50 TABLET, FILM COATED ORAL DAILY
Qty: 30 TABLET | Refills: 1 | Status: SHIPPED | OUTPATIENT
Start: 2025-03-08

## 2025-03-07 RX ORDER — MULTIVITAMIN WITH IRON
1 TABLET ORAL DAILY
COMMUNITY
Start: 2025-03-08

## 2025-03-07 RX ORDER — FUROSEMIDE 40 MG/1
40 TABLET ORAL DAILY
Status: DISCONTINUED | OUTPATIENT
Start: 2025-03-07 | End: 2025-03-07 | Stop reason: HOSPADM

## 2025-03-07 RX ORDER — LANOLIN ALCOHOL/MO/W.PET/CERES
100 CREAM (GRAM) TOPICAL DAILY
Qty: 30 TABLET | Refills: 1 | Status: SHIPPED | OUTPATIENT
Start: 2025-03-08

## 2025-03-07 RX ORDER — FUROSEMIDE 40 MG/1
40 TABLET ORAL DAILY
Qty: 60 TABLET | Refills: 1 | Status: SHIPPED | OUTPATIENT
Start: 2025-03-08

## 2025-03-07 RX ADMIN — Medication 500 MCG: at 09:32

## 2025-03-07 RX ADMIN — TAMSULOSIN HYDROCHLORIDE 0.4 MG: 0.4 CAPSULE ORAL at 09:32

## 2025-03-07 RX ADMIN — SODIUM CHLORIDE, PRESERVATIVE FREE 10 ML: 5 INJECTION INTRAVENOUS at 09:35

## 2025-03-07 RX ADMIN — Medication 100 MG: at 09:32

## 2025-03-07 RX ADMIN — FUROSEMIDE 40 MG: 40 TABLET ORAL at 16:05

## 2025-03-07 RX ADMIN — ATORVASTATIN CALCIUM 10 MG: 10 TABLET, FILM COATED ORAL at 09:32

## 2025-03-07 RX ADMIN — SPIRONOLACTONE 50 MG: 25 TABLET ORAL at 16:05

## 2025-03-07 RX ADMIN — LISINOPRIL 15 MG: 5 TABLET ORAL at 09:32

## 2025-03-07 RX ADMIN — PANTOPRAZOLE SODIUM 40 MG: 40 TABLET, DELAYED RELEASE ORAL at 06:31

## 2025-03-07 RX ADMIN — FINASTERIDE 5 MG: 5 TABLET, FILM COATED ORAL at 09:31

## 2025-03-07 RX ADMIN — ASPIRIN 81 MG: 81 TABLET, CHEWABLE ORAL at 09:31

## 2025-03-07 RX ADMIN — AMLODIPINE BESYLATE 5 MG: 5 TABLET ORAL at 09:32

## 2025-03-07 RX ADMIN — FLUOXETINE HYDROCHLORIDE 80 MG: 20 CAPSULE ORAL at 09:32

## 2025-03-07 RX ADMIN — THERA TABS 1 TABLET: TAB at 09:31

## 2025-03-07 NOTE — PROCEDURES
PROCEDURE NOTE  Date: 3/7/2025   Name: Enoc Mancia  YOB: 1955    Procedures    Image guided right Paracentesis completed.  3.7 liters of dark yellow colored withdrawn.  Pt tolerated procedure without any signs or symptoms of distress. Vital signs stable.     DISCHARGED:  ADMITTED: Pt transferred back to room 0216. Report called to nurse.     SPECIMEN SENT:  Yes    Vital Signs  Vitals:    03/07/25 1039   BP: 124/83   Pulse:    Resp:    Temp:    SpO2:     (vital signs in table format)

## 2025-03-07 NOTE — PROGRESS NOTES
Reason for Paracentesis: Therapeutic Consult has been called to Dr. david on 3/7/25. Spoke with ultrasound tech. 8:27 AM    Saranya Martinez

## 2025-03-07 NOTE — CARE COORDINATION
Case Management Assessment  Initial Evaluation    Date/Time of Evaluation: 3/7/2025 9:54 AM  Assessment Completed by: Kymberly Cobos RN    If patient is discharged prior to next notation, then this note serves as note for discharge by case management.    Patient Name: Enoc Mancia                   YOB: 1955  Diagnosis: Pericardial effusion [I31.39]  Alcoholic cirrhosis of liver with ascites (HCC) [K70.31]  Ascites due to alcoholic cirrhosis (HCC) [K70.31]                   Date / Time: 3/6/2025  1:05 PM    Patient Admission Status: Inpatient   Readmission Risk (Low < 19, Mod (19-27), High > 27): Readmission Risk Score: 14    Current PCP: Sofia Whitt, SATISH - CNP  PCP verified by CM? Yes (Peri)    Chart Reviewed: Yes      History Provided by: Patient  Patient Orientation: Alert and Oriented    Patient Cognition: Alert    Hospitalization in the last 30 days (Readmission):  No    If yes, Readmission Assessment in CM Navigator will be completed.    Advance Directives:      Code Status: Full Code   Patient's Primary Decision Maker is: Legal Next of Kin    Primary Decision Maker: Dara Mancia - Spouse - [phone]    Discharge Planning:    Patient lives with: Spouse/Significant Other Type of Home: House (Lives 1 story home. 3Steps)  Primary Care Giver: Self  Patient Support Systems include: Spouse/Significant Other, Children, Family Members   Current Financial resources: Other (Comment) ([facility] Medicare)  Current community resources: None  Current services prior to admission: Durable Medical Equipment            Current DME: Walker, Wheelchair, Cane            Type of Home Care services:  None    ADLS  Prior functional level: Independent in ADLs/IADLs  Current functional level: Independent in ADLs/IADLs    PT AM-PAC:   /24  OT AM-PAC:   /24    Family can provide assistance at DC: Yes  Would you like Case Management to discuss the discharge plan with any other family members/significant

## 2025-03-07 NOTE — DISCHARGE SUMMARY
Hospital Medicine Discharge Summary    Patient: Enoc Mancia     Gender: male  : 1955   Age: 69 y.o.  MRN: [id number]    Admitting Physician: Caleb Harvey MD  Discharge Physician: Maria R Benjamin DO    Code Status: Full Code     Admit Date: 3/6/2025   Discharge Date:  3/7/2025     Discharge Diagnoses:    Active Hospital Problems    Diagnosis Date Noted    Ascites due to alcoholic cirrhosis (HCC) [K70.31] 2025    Alcohol abuse [F10.10] 2025    Benign prostatic hyperplasia without lower urinary tract symptoms [N40.0]      Condition at Discharge: Stable    Hospital Course:     #Alcoholic cirrhosis with decompensation/ascites   #Hepatic mass, hypodense, has been stable since   #Transaminitis   -imaging as above  -had diagnostic paracentesis last week  -has not established with GI yet   -US guided paracentesis done  -reportedly has been tapering down alcohol use - discussed with the patient  -GI consulted   - started on lasix and aldactone he has a follow-up appointment.      #Pancytopenia  #Macrocytosis   -likely 2/2 to above      #Alcohol abuse   -recommend cessation   -CIWA protocol with prn ativan   -thiamine and MV   -fall and seizure precautions      #Hyponatremia   -chronic   -likely 2/2 to cirrhosis      #Mild pericardial effusion  -echo pending      #HTN   -on toprol XL, norvasc, and lisinopril      #Bilateral hip avascular necrosis  -follow up with PCP      #Rheumatoid arthritis   -follows with rheumatology      #BPH   -on proscar and flomax      #Cannabis use      #HLD   -statin     Additional findings or notes to primary provider:  None at this time    Discharge Medications:   Current Discharge Medication List        START taking these medications    Details   spironolactone (ALDACTONE) 50 MG tablet Take 1 tablet by mouth daily  Qty: 30 tablet, Refills: 1      Multiple Vitamin (MULTIVITAMIN) TABS tablet Take 1 tablet by mouth daily      furosemide (LASIX) 40 MG tablet  DAY NIGHTLY  Qty: 90 capsule, Refills: 3      Probiotic Product (CULTURELLE PROBIOTICS PO) Take by mouth daily      sildenafil (VIAGRA) 25 MG tablet Take 3-4 tablets by mouth as needed for Erectile Dysfunction      Cholecalciferol (VITAMIN D HIGH POTENCY PO) Take 500 mcg by mouth daily      vitamin B-12 (CYANOCOBALAMIN) 500 MCG tablet Take 1 tablet by mouth daily      polycarbophil (FIBERCON) 625 MG tablet Take 1 tablet by mouth daily      aspirin 81 MG chewable tablet Take 1 tablet by mouth daily           Current Discharge Medication List        STOP taking these medications       magnesium citrate solution Comments:   Reason for Stopping:         sulindac (CLINORIL) 150 MG tablet Comments:   Reason for Stopping:               Discharge ROS:  A complete review of systems was asked and negative.    Discharge Exam:    /78   Pulse 83   Temp 97.5 °F (36.4 °C) (Oral)   Resp 14   Ht 1.778 m (5' 10\")   Wt 78.5 kg (173 lb)   SpO2 95%   BMI 24.82 kg/m²   General appearance:  NAD  HEENT:   Normal cephalic, atraumatic, moist mucous membranes, no oropharyngeal erythema or exudate  Neck: Supple, trachea midline, no anterior cervical or SC LAD  Heart:: Normal s1/s2, RRR, no murmurs, gallops, or rubs. No leg edema  Lungs:  Normal respiratory effort. Clear to auscultation bilaterally, no wheeze, rales or rhonchi.  Abdomen: Soft, non-tender, non-distended, bowel sounds present, no masses  Musculoskeletal:  No clubbing, no cyanosis, or edema  Skin: No lesion or masses  Neurologic:  Neurovascularly intact without any focal sensory/motor deficits. Cranial nerves: II-XII intact, grossly non-focal.  Psychiatric:  Alert and oriented, thought content appropriate    Labs: For convenience and continuity at follow-up the following most recent labs are provided:    Lab Results   Component Value Date/Time    WBC 5.9 03/07/2025 06:17 AM    HGB 14.2 03/07/2025 06:17 AM    HCT 40.6 03/07/2025 06:17 AM    .0 03/07/2025 06:17

## 2025-03-07 NOTE — CONSULTS
Consultation Note    Patient Name: Enoc Mancia  : 1955  Age: 69 y.o.     Admitting Physician: Sukhwinder Harvey*   Date of Admission: 3/6/2025  1:05 PM   Primary Care Physician: Sofia Whitt, APRN - CNP        Enoc Mancia is being seen at the request of Sukhwinder Harvey* for ascites.    History of Present Illness:  69 year old male with ETOH related cirrhosis, rheumatoid arthritis on Xeljanz admitted with worsening ascites.  Patient seen in ER last week with 2.5L removed.  Patient cancelled appointment on 2025 with [facility] DR. Dykes and now scheduled on 2025 with Dr. Nela Saunders.  Patient previously diagnosed with alcohol related liver disease in  and told to completely abstain.  Patient continuing to consume alcohol and smoke Unicotrip.  The patient presents with worsening abdominal distension and discomfort, early satiety.  The patient denies any nausea, vomiting, hematemesis or coffee ground emesis. The patient denies any rectal bleeding or melena. The patient denies any symptoms consistent with encephalopathy.      GI History:  EGD and Colonoscopy : colon polyps removed, hiatus hernia    Past Medical History:  Past Medical History:   Diagnosis Date    Acid reflux     Arthritis     rheumatoid    Cirrhosis (HCC)     Depression     Enlarged prostate     Hearing decreased     does not wear his hearing aid    Hyperlipidemia     no meds    Hypertension     Liver disease     Stage 2 per pt     Muscle pain     Rheumatoid arthritis (HCC)     Seizure (HCC) 2025        Past Surgical History:  Past Surgical History:   Procedure Laterality Date    APPENDECTOMY      CARDIAC CATHETERIZATION      COLONOSCOPY  2013    polyps    COLONOSCOPY  2021    COLONOSCOPY N/A 2021    COLON W/ANES. (9:00) **FIBROSCAN TOO** performed by Valeriy Talley MD at [facility] ENDOSCOPY    CYSTOSCOPY      OTHER SURGICAL HISTORY  2012            at age 23    Diabetes Mother     Heart Disease Father     High Blood Pressure Father         Allergies:  Allergies   Allergen Reactions    Codeine      Not sure of reaction    Pcn [Penicillins]      Not sure of reaction        ROS:   General: No fever or weight change  Hematologic: No unexpected submucosal bleeding or bruising  HEENT: No sore throat or facial pain  Respiratory: No cough or dyspnea  Cardiovascular: No angina or dependent edema  Gastrointestinal: See HPI  Musculoskeletal: No usual joint pain or stiffness  Skin: No skin eruptions or changing lesions  Neurologic: No focal weakness or numbness  Psychiatric: No anxiety or sleep disturbance    Physical Exam:  Vital Signs:   Vitals:    03/06/25 2145   BP: 124/83   Pulse: 77   Resp: 16   Temp: 97.3 °F (36.3 °C)   SpO2: 93%       General: Well-nourished, well-developed  HEENT: Sclera anicteric, mucosal membranes moist  Cardiovascular: Regular rate and rhythm.  No murmurs.  Respiratory: Respirations nonlabored, no crepitus  GI: Abdomen distended, soft, and central area tender.  Normal active bowel sounds.  No masses palpable.   Rectal: Deferred  Musculoskeletal: No pitting edema of the lower legs.  Neurological: Gross memory appears intact.  Patient is alert and oriented      Recent Imaging:   CT ABDOMEN PELVIS W IV CONTRAST Additional Contrast? None  Narrative: EXAMINATION:  CT OF THE ABDOMEN AND PELVIS WITH CONTRAST 3/6/2025 3:05 pm    TECHNIQUE:  CT of the abdomen and pelvis was performed with the administration of  intravenous contrast. Multiplanar reformatted images are provided for review.  Automated exposure control, iterative reconstruction, and/or weight based  adjustment of the mA/kV was utilized to reduce the radiation dose to as low  as reasonably achievable.    COMPARISON:  CT abdomen/pelvis 02/24/2025, 05/08/2024.    HISTORY:  ORDERING SYSTEM PROVIDED HISTORY: increased abdominal swelling, hx cirrhosis  TECHNOLOGIST PROVIDED

## 2025-03-07 NOTE — DISCHARGE INSTRUCTIONS
Fluid intake per day - no more than 2 L per day  Salt - recommend less than 2 g per day    Check your blood pressures at home twice daily every day after at last 5 minutes at rest with feet on the ground and back supported. Bring your cuff to the doctors office to compare. Call your primary care or cardiologist tomorrow to make an appointment for hospital follow-up.     If you are having blood pressures stop the amlodipine.

## 2025-03-07 NOTE — PROGRESS NOTES
4 Eyes Skin Assessment     NAME:  Enoc Mancia  YOB: 1955  MEDICAL RECORD NUMBER:  [id number]    The patient is being assessed for  Admission    I agree that at least one RN has performed a thorough Head to Toe Skin Assessment on the patient. ALL assessment sites listed below have been assessed.      Areas assessed by both nurses:    Head, Face, Ears, Shoulders, Back, Chest, Arms, Elbows, Hands, Sacrum. Buttock, Coccyx, Ischium, Legs. Feet and Heels, and Under Medical Devices         Does the Patient have a Wound? No noted wound(s)       Evan Prevention initiated by RN: No  Wound Care Orders initiated by RN: No    Pressure Injury (Stage 3,4, Unstageable, DTI, NWPT, and Complex wounds) if present, place Wound referral order by RN under : No    New Ostomies, if present place, Ostomy referral order under : No     Nurse 1 eSignature: Electronically signed by VIVIANA CLEMENS RN on 3/6/25 at 10:23 PM EST    **SHARE this note so that the co-signing nurse can place an eSignature**    Nurse 2 eSignature: Electronically signed by Leigh Fontana RN on 3/7/25 at 1:18 AM EST

## 2025-03-07 NOTE — PROCEDURES
PROCEDURE NOTE  Date: 3/7/2025   Name: Enoc Mancia  YOB: 1955    Procedures    Pt arrived for image guided right Paracentesis. Dr. Israel explained the procedure including the risk and benefits of the procedure. All questions answered. Pt verbalizes understanding of the procedure and states no more questions. Consent confirmed. Vital signs stable. Labs, allergies, medications, and code status reviewed. No contraindications noted. Time out completed prior to procedure.    Vital Signs  Vitals:    03/07/25 1039   BP: 124/83   Pulse:    Resp:    Temp:    SpO2:     (vital signs in table format)      Allergies  Codeine and Pcn [penicillins] (allergies)    Labs  Lab Results   Component Value Date    INR 1.04 03/06/2025    PROTIME 13.8 03/06/2025     Lab Results   Component Value Date    CREATININE 0.6 (L) 03/07/2025    BUN <2 (L) 03/07/2025     (L) 03/07/2025    K 4.0 03/07/2025     03/07/2025    CO2 21 03/07/2025     Lab Results   Component Value Date    WBC 5.9 03/07/2025    HGB 14.2 03/07/2025    HCT 40.6 03/07/2025    .0 (H) 03/07/2025     (L) 03/07/2025

## 2025-03-07 NOTE — PROGRESS NOTES
Pt Aox4 VSS pt stated discomforted ans stated he did not want pain medication. Pt orientated to room call light in reach.

## 2025-03-07 NOTE — FLOWSHEET NOTE
03/07/25 0907   Vital Signs   Temp 97.5 °F (36.4 °C)   Temp Source Oral   Pulse 81   Heart Rate Source Monitor   Respirations 16   /87   MAP (Calculated) 98   BP Location Right upper arm   BP Method Automatic   Pain Assessment   Pain Assessment None - Denies Pain   Opioid-Induced Sedation   POSS Score 1   Oxygen Therapy   SpO2 90 %   O2 Device None (Room air)     Assessment complete and morning medications administered. Patient is alert and oriented.    Bedside Mobility Assessment Tool (BMAT):     Assessment Level 1- Sit and Shake    1. From a semi-reclined position, ask patient to sit up and rotate to a seated position at the side of the bed. Can use the bedrail.    2. Ask patient to reach out and grab your hand and shake making sure patient reaches across his/her midline.   Pass- Patient is able to come to a seated position, maintain core strength. Maintains seated balance while reaching across midline. Move on to Assessment Level 2.     Assessment Level 2- Stretch and Point   1. With patient in seated position at the side of the bed, have patient place both feet on the floor (or stool) with knees no higher than hips.    2. Ask patient to stretch one leg and straighten the knee, then bend the ankle/flex and point the toes. If appropriate, repeat with the other leg.   Pass- Patient is able to demonstrate appropriate quad strength on intended weight bearing limb(s). Move onto Assessment Level 3.     Assessment Level 3- Stand   1. Ask patient to elevate off the bed or chair (seated to standing) using an assistive device (cane, bedrail).    2. Patient should be able to raise buttocks off be and hold for a count of five. May repeat once.   Pass- Patient maintains standing stability for at least 5 seconds, proceed to assessment level 4.    Assessment Level 4- Walk   1. Ask patient to march in place at bedside.    2. Then ask patient to advance step and return each foot. Some medical conditions may render a  patient from stepping backwards, use your best clinical judgement.   Pass- Patient demonstrates balance while shifting weight and ability to step, takes independent steps, does not use assistive device patient is MOBILITY LEVEL 4.      Mobility Level- 4

## 2025-03-09 LAB
BACTERIA FLD AEROBE CULT: NORMAL
GRAM STN SPEC: NORMAL

## 2025-03-10 ENCOUNTER — TELEPHONE (OUTPATIENT)
Dept: FAMILY MEDICINE CLINIC | Age: 70
End: 2025-03-10

## 2025-03-10 LAB
BACTERIA FLD AEROBE CULT: NORMAL
GRAM STN SPEC: NORMAL

## 2025-03-10 NOTE — TELEPHONE ENCOUNTER
Care Transitions Initial Follow Up Call    Outreach made within 2 business days of discharge: Yes    Patient: Enoc Mancia Patient : 1955   MRN: [id number]  Reason for Admission: Ascites  Discharge Date: 3/7/25       Spoke with: Wife in person    Discharge department/facility: [facility] Interactive Patient Contact:  Was patient able to fill all prescriptions: Yes  Was patient instructed to bring all medications to the follow-up visit: Yes  Is patient taking all medications as directed in the discharge summary? Yes  Does patient understand their discharge instructions: Yes  Does patient have questions or concerns that need addressed prior to 7-14 day follow up office visit: no    Additional needs identified to be addressed with provider  None             Scheduled appointment with PCP within 7-14 days    Follow Up  Future Appointments   Date Time Provider Department Center   3/12/2025 12:00 PM Sofia Whitt, APRN - CNP [facility]   2025 11:45 AM Sparkle Johnson MD [facility] Neu[facility] -   2025  9:00 AM Jodrey, Sofia D, APRN - CNP [facility]   2025 11:00 AM Jodrey, Sofia D, APRN - CNP [facility]       YANIRA PATEL MA

## 2025-03-12 ENCOUNTER — OFFICE VISIT (OUTPATIENT)
Dept: FAMILY MEDICINE CLINIC | Age: 70
End: 2025-03-12

## 2025-03-12 VITALS
WEIGHT: 170.4 LBS | BODY MASS INDEX: 24.45 KG/M2 | SYSTOLIC BLOOD PRESSURE: 101 MMHG | HEART RATE: 88 BPM | DIASTOLIC BLOOD PRESSURE: 70 MMHG | OXYGEN SATURATION: 90 %

## 2025-03-12 DIAGNOSIS — E78.00 PURE HYPERCHOLESTEROLEMIA: ICD-10-CM

## 2025-03-12 DIAGNOSIS — K70.31 ALCOHOLIC CIRRHOSIS OF LIVER WITH ASCITES (HCC): Primary | ICD-10-CM

## 2025-03-12 DIAGNOSIS — I10 PRIMARY HYPERTENSION: ICD-10-CM

## 2025-03-12 DIAGNOSIS — Z09 HOSPITAL DISCHARGE FOLLOW-UP: ICD-10-CM

## 2025-03-12 DIAGNOSIS — F10.10 ALCOHOL ABUSE: ICD-10-CM

## 2025-03-12 DIAGNOSIS — F33.42 RECURRENT MAJOR DEPRESSIVE DISORDER, IN FULL REMISSION: ICD-10-CM

## 2025-03-12 DIAGNOSIS — F12.20 ADDICTION, MARIJUANA (HCC): ICD-10-CM

## 2025-03-12 ASSESSMENT — ENCOUNTER SYMPTOMS
ABDOMINAL DISTENTION: 1
VOMITING: 0
RECTAL PAIN: 0
SHORTNESS OF BREATH: 0
BLOOD IN STOOL: 0
NAUSEA: 0
CONSTIPATION: 0
ALLERGIC/IMMUNOLOGIC NEGATIVE: 1
CHEST TIGHTNESS: 0
EYES NEGATIVE: 1
RESPIRATORY NEGATIVE: 1
DIARRHEA: 0
ABDOMINAL PAIN: 1

## 2025-03-12 NOTE — PROGRESS NOTES
distention and abdominal pain (fullness). Negative for blood in stool, constipation, diarrhea, nausea, rectal pain and vomiting.   Endocrine: Negative.    Genitourinary: Negative.    Musculoskeletal: Negative.    Skin: Negative.  Negative for rash and wound.   Allergic/Immunologic: Negative.    Neurological: Negative.  Negative for dizziness, light-headedness and headaches.   Hematological: Negative.    Psychiatric/Behavioral: Negative.  Negative for dysphoric mood.        Objective:    There were no vitals taken for this visit.  Physical Exam  Vitals and nursing note reviewed.   Constitutional:       General: He is not in acute distress.     Appearance: Normal appearance. He is well-developed and well-groomed. He is not ill-appearing or toxic-appearing.   HENT:      Head: Normocephalic and atraumatic.   Eyes:      Extraocular Movements: Extraocular movements intact.      Conjunctiva/sclera: Conjunctivae normal.   Cardiovascular:      Rate and Rhythm: Normal rate and regular rhythm.      Pulses: Normal pulses.      Heart sounds: Normal heart sounds, S1 normal and S2 normal.   Pulmonary:      Effort: Pulmonary effort is normal. No accessory muscle usage or respiratory distress.      Breath sounds: Normal breath sounds.   Abdominal:      General: Bowel sounds are normal. There is distension.      Palpations: Abdomen is soft. There is fluid wave and hepatomegaly.   Musculoskeletal:      Cervical back: Neck supple.      Right lower leg: No edema.      Left lower leg: No edema.   Lymphadenopathy:      Cervical: No cervical adenopathy.   Skin:     General: Skin is warm and moist.      Capillary Refill: Capillary refill takes less than 2 seconds.      Findings: No rash.   Neurological:      General: No focal deficit present.      Mental Status: He is alert and oriented to person, place, and time. Mental status is at baseline.   Psychiatric:         Attention and Perception: Attention normal.         Mood and Affect: Mood

## 2025-03-14 NOTE — TELEPHONE ENCOUNTER
Called and spoke to Patient about upcoming procedure. Phone number used: [phone]   Procedure: Paracentesis       Pt informed of the following:  Date of procedure: 3/17/25  Arrival time of procedure: 1330  Time of procedure: 1400  Check in at Main Entrance prior to procedure.    Pre Procedure Checklist:   H & P completed within 30 days of scheduled procedure?Yes  If No  Call placed to PCP N/A    Allergies:  Reviewed?Yes  Have you ever had a reaction to Contrast/ IV Dye? No  If Yes, What was reaction?   Lidocaine Allergy?  No  If Yes, What was reaction?     Medications:  On any blood thinners? No.   If yes: N/A  Instructed to hold: N/A     Blood pressure medication?  Yes. If yes, take the morning of procedure.     Sedation:  Sedation Type:Local only    Any issues with sedation in the past? No  If Yes, What was reaction?

## 2025-03-16 ENCOUNTER — HOSPITAL ENCOUNTER (INPATIENT)
Age: 70
LOS: 2 days | Discharge: HOME | DRG: 433 | End: 2025-03-18
Attending: EMERGENCY MEDICINE | Admitting: INTERNAL MEDICINE
Payer: MEDICARE

## 2025-03-16 DIAGNOSIS — K70.31 ASCITES DUE TO ALCOHOLIC CIRRHOSIS (HCC): ICD-10-CM

## 2025-03-16 DIAGNOSIS — N17.9 AKI (ACUTE KIDNEY INJURY): Primary | ICD-10-CM

## 2025-03-16 DIAGNOSIS — K92.1 MELENA: ICD-10-CM

## 2025-03-16 LAB
ALBUMIN SERPL-MCNC: 2.3 G/DL (ref 3.4–5)
ALBUMIN/GLOB SERPL: 0.5 {RATIO} (ref 1.1–2.2)
ALP SERPL-CCNC: 166 U/L (ref 40–129)
ALT SERPL-CCNC: 109 U/L (ref 10–40)
AMMONIA PLAS-SCNC: 62 UMOL/L (ref 16–60)
ANION GAP SERPL CALCULATED.3IONS-SCNC: 11 MMOL/L (ref 3–16)
AST SERPL-CCNC: 206 U/L (ref 15–37)
BASOPHILS # BLD: 0 K/UL (ref 0–0.2)
BASOPHILS NFR BLD: 0.5 %
BILIRUB SERPL-MCNC: 1.4 MG/DL (ref 0–1)
BUN SERPL-MCNC: 49 MG/DL (ref 7–20)
CALCIUM SERPL-MCNC: 8 MG/DL (ref 8.3–10.6)
CHLORIDE SERPL-SCNC: 100 MMOL/L (ref 99–110)
CO2 SERPL-SCNC: 18 MMOL/L (ref 21–32)
CREAT SERPL-MCNC: 1.7 MG/DL (ref 0.8–1.3)
DEPRECATED RDW RBC AUTO: 14.5 % (ref 12.4–15.4)
EOSINOPHIL # BLD: 0 K/UL (ref 0–0.6)
EOSINOPHIL NFR BLD: 0.3 %
FLUAV RNA RESP QL NAA+PROBE: NOT DETECTED
FLUBV RNA RESP QL NAA+PROBE: NOT DETECTED
GFR SERPLBLD CREATININE-BSD FMLA CKD-EPI: 43 ML/MIN/{1.73_M2}
GLUCOSE SERPL-MCNC: 83 MG/DL (ref 70–99)
HCT VFR BLD AUTO: 43.7 % (ref 40.5–52.5)
HGB BLD-MCNC: 15.1 G/DL (ref 13.5–17.5)
INR PPP: 1.14 (ref 0.85–1.15)
LIPASE SERPL-CCNC: 52 U/L (ref 13–60)
LYMPHOCYTES # BLD: 0.3 K/UL (ref 1–5.1)
LYMPHOCYTES NFR BLD: 3.8 %
MCH RBC QN AUTO: 35.5 PG (ref 26–34)
MCHC RBC AUTO-ENTMCNC: 34.6 G/DL (ref 31–36)
MCV RBC AUTO: 102.6 FL (ref 80–100)
MONOCYTES # BLD: 0.6 K/UL (ref 0–1.3)
MONOCYTES NFR BLD: 7.3 %
NEUTROPHILS # BLD: 7.4 K/UL (ref 1.7–7.7)
NEUTROPHILS NFR BLD: 88.1 %
PLATELET # BLD AUTO: 120 K/UL (ref 135–450)
PMV BLD AUTO: 9.8 FL (ref 5–10.5)
POTASSIUM SERPL-SCNC: 4.5 MMOL/L (ref 3.5–5.1)
PROT SERPL-MCNC: 6.5 G/DL (ref 6.4–8.2)
PROTHROMBIN TIME: 14.8 SEC (ref 11.9–14.9)
RBC # BLD AUTO: 4.26 M/UL (ref 4.2–5.9)
SARS-COV-2 RNA RESP QL NAA+PROBE: NOT DETECTED
SODIUM SERPL-SCNC: 129 MMOL/L (ref 136–145)
WBC # BLD AUTO: 8.4 K/UL (ref 4–11)

## 2025-03-16 PROCEDURE — 85025 COMPLETE CBC W/AUTO DIFF WBC: CPT

## 2025-03-16 PROCEDURE — 85610 PROTHROMBIN TIME: CPT

## 2025-03-16 PROCEDURE — 99285 EMERGENCY DEPT VISIT HI MDM: CPT

## 2025-03-16 PROCEDURE — 2580000003 HC RX 258

## 2025-03-16 PROCEDURE — 82140 ASSAY OF AMMONIA: CPT

## 2025-03-16 PROCEDURE — 1200000000 HC SEMI PRIVATE

## 2025-03-16 PROCEDURE — 83690 ASSAY OF LIPASE: CPT

## 2025-03-16 PROCEDURE — 87636 SARSCOV2 & INF A&B AMP PRB: CPT

## 2025-03-16 PROCEDURE — 80053 COMPREHEN METABOLIC PANEL: CPT

## 2025-03-16 RX ORDER — SODIUM CHLORIDE 0.9 % (FLUSH) 0.9 %
5-40 SYRINGE (ML) INJECTION EVERY 12 HOURS SCHEDULED
Status: DISCONTINUED | OUTPATIENT
Start: 2025-03-16 | End: 2025-03-18 | Stop reason: HOSPADM

## 2025-03-16 RX ORDER — SODIUM CHLORIDE 0.9 % (FLUSH) 0.9 %
5-40 SYRINGE (ML) INJECTION PRN
Status: DISCONTINUED | OUTPATIENT
Start: 2025-03-16 | End: 2025-03-18 | Stop reason: HOSPADM

## 2025-03-16 RX ORDER — SODIUM CHLORIDE 9 MG/ML
INJECTION, SOLUTION INTRAVENOUS PRN
Status: DISCONTINUED | OUTPATIENT
Start: 2025-03-16 | End: 2025-03-18 | Stop reason: HOSPADM

## 2025-03-16 RX ORDER — 0.9 % SODIUM CHLORIDE 0.9 %
1000 INTRAVENOUS SOLUTION INTRAVENOUS ONCE
Status: COMPLETED | OUTPATIENT
Start: 2025-03-16 | End: 2025-03-16

## 2025-03-16 RX ORDER — MAGNESIUM SULFATE IN WATER 40 MG/ML
2000 INJECTION, SOLUTION INTRAVENOUS PRN
Status: DISCONTINUED | OUTPATIENT
Start: 2025-03-16 | End: 2025-03-18 | Stop reason: HOSPADM

## 2025-03-16 RX ORDER — ENOXAPARIN SODIUM 100 MG/ML
40 INJECTION SUBCUTANEOUS DAILY
Status: DISCONTINUED | OUTPATIENT
Start: 2025-03-16 | End: 2025-03-18 | Stop reason: HOSPADM

## 2025-03-16 RX ORDER — POTASSIUM CHLORIDE 7.45 MG/ML
10 INJECTION INTRAVENOUS PRN
Status: DISCONTINUED | OUTPATIENT
Start: 2025-03-16 | End: 2025-03-18 | Stop reason: HOSPADM

## 2025-03-16 RX ORDER — POTASSIUM CHLORIDE 1500 MG/1
40 TABLET, EXTENDED RELEASE ORAL PRN
Status: DISCONTINUED | OUTPATIENT
Start: 2025-03-16 | End: 2025-03-18 | Stop reason: HOSPADM

## 2025-03-16 RX ORDER — ACETAMINOPHEN 650 MG/1
650 SUPPOSITORY RECTAL EVERY 6 HOURS PRN
Status: DISCONTINUED | OUTPATIENT
Start: 2025-03-16 | End: 2025-03-18 | Stop reason: HOSPADM

## 2025-03-16 RX ORDER — ACETAMINOPHEN 325 MG/1
650 TABLET ORAL EVERY 6 HOURS PRN
Status: DISCONTINUED | OUTPATIENT
Start: 2025-03-16 | End: 2025-03-18 | Stop reason: HOSPADM

## 2025-03-16 RX ORDER — POLYETHYLENE GLYCOL 3350 17 G/17G
17 POWDER, FOR SOLUTION ORAL DAILY PRN
Status: DISCONTINUED | OUTPATIENT
Start: 2025-03-16 | End: 2025-03-18 | Stop reason: HOSPADM

## 2025-03-16 RX ORDER — ONDANSETRON 4 MG/1
4 TABLET, ORALLY DISINTEGRATING ORAL EVERY 8 HOURS PRN
Status: DISCONTINUED | OUTPATIENT
Start: 2025-03-16 | End: 2025-03-18 | Stop reason: HOSPADM

## 2025-03-16 RX ORDER — ONDANSETRON 2 MG/ML
4 INJECTION INTRAMUSCULAR; INTRAVENOUS EVERY 6 HOURS PRN
Status: DISCONTINUED | OUTPATIENT
Start: 2025-03-16 | End: 2025-03-18 | Stop reason: HOSPADM

## 2025-03-16 RX ADMIN — SODIUM CHLORIDE 1000 ML: 0.9 INJECTION, SOLUTION INTRAVENOUS at 17:17

## 2025-03-16 ASSESSMENT — PAIN DESCRIPTION - LOCATION: LOCATION: ABDOMEN

## 2025-03-16 ASSESSMENT — PAIN - FUNCTIONAL ASSESSMENT: PAIN_FUNCTIONAL_ASSESSMENT: 0-10

## 2025-03-16 ASSESSMENT — PAIN SCALES - GENERAL: PAINLEVEL_OUTOF10: 7

## 2025-03-16 NOTE — ED NOTES
I have introduced myself to the patient and laid eyes on him at this time. The patient is aox4, on room air, and resting in his bed at this time. The patient is aware that we are waiting on admission orders and he verbalizes understanding.

## 2025-03-16 NOTE — ED PROVIDER NOTES
THIS IS MY SHELBY SUPERVISORY AND SHARED VISIT NOTE:    I personally saw the patient and made/approved the management plan and take responsibility for the patient management.    History: 69-year-old male presented valuation of abnormal outpatient lab.  Patient had laboratory evaluation done by his GI doctor.  He was called about abnormal labs with showing renal dysfunction.  He has had decreased p.o. intake, poor appetite.  Send chronic abdominal pain.    Exam: No peritoneal change to abdominal exam, alert, oriented no focal neurologic deficit    MDM: 69-year-old male presenting for ration of abnormal labs with decreased kidney function.  On review of outpatient labs, creatinine is nearly doubled, patient will be initiated on IV fluids.  Will obtain repeat labs.  GI has been consulted.  Plan for patient to be admitted to facilitate nephrology evaluation as well as further GI evaluation with paracentesis that has been previously planned        I personally saw the patient and independently provided 0 minutes of non-concurrent critical care out of the total shared critical care time provided.         No results found.      I, Dr. Delong, am the primary clinician of record.     Comment: Please note this report has been produced using speech recognition software and may contain errors related to that system including errors in grammar, punctuation, and spelling, as well as words and phrases that may be inappropriate. If there are any questions or concerns please feel free to contact the dictating provider for clarification.     Pedrito Delong MD  03/16/25 9189    
patient's NASRA patient benefit from inpatient admission for further workup and management as well as GI consult for paracentesis.  Spoke with inpatient hospitalist who agreed to admit the patient.  Patient is in agreement with the plan.  Disposition Considerations (tests considered but not done, Admit vs D/C, Shared Decision Making, Pt Expectation of Test or Tx.):      The patient tolerated their visit well.  The patient and / or the family were informed of the results of any tests, a time was given to answer questions, a plan was proposed and they agreed with plan.    I am the Primary Clinician of Record.  FINAL IMPRESSION      1. NASRA (acute kidney injury)    2. Ascites due to alcoholic cirrhosis (HCC)          DISPOSITION/PLAN     DISPOSITION Admitted 03/16/2025 06:37:17 PM               PATIENT REFERRED TO:  No follow-up provider specified.    DISCHARGE MEDICATIONS:  New Prescriptions    No medications on file       DISCONTINUED MEDICATIONS:  Discontinued Medications    No medications on file              (Please note that portions of this note were completed with a voice recognition program.  Efforts were made to edit the dictations but occasionally words are mis-transcribed.)    RAIZA Hinkle (electronically signed)      Mirna Rangel PA  03/16/25 1944

## 2025-03-16 NOTE — ED NOTES
1630 - Call placed to [facility] for consult    1633 - Dr. Chandra called and spoke with RAIZA Alvarez to complete the consult

## 2025-03-16 NOTE — PLAN OF CARE
Sent in by GI, Dr. Rondon for NASRA and paracentesis.  Chronic transaminitis.     -s/p 1L bolus in ED, recheck Cr in AM prior to additional fluids.  -GI consulted.  -paracentesis in AM.  -nursing communication for home med rec.  -may need nephro consult in AM if no improvement in Cr.    Tati Hepmhill PA-C 3/16/2025 6:38 PM

## 2025-03-17 ENCOUNTER — ANESTHESIA EVENT (OUTPATIENT)
Dept: ENDOSCOPY | Age: 70
DRG: 433 | End: 2025-03-17
Payer: MEDICARE

## 2025-03-17 ENCOUNTER — APPOINTMENT (OUTPATIENT)
Dept: ULTRASOUND IMAGING | Age: 70
DRG: 433 | End: 2025-03-17
Payer: MEDICARE

## 2025-03-17 PROBLEM — N17.9 AKI (ACUTE KIDNEY INJURY): Status: ACTIVE | Noted: 2025-03-17

## 2025-03-17 PROBLEM — D61.818 PANCYTOPENIA: Status: ACTIVE | Noted: 2025-03-17

## 2025-03-17 PROBLEM — E87.1 HYPONATREMIA: Status: ACTIVE | Noted: 2025-03-17

## 2025-03-17 LAB
ALBUMIN FLD-MCNC: 0.5 G/DL
ANION GAP SERPL CALCULATED.3IONS-SCNC: 8 MMOL/L (ref 3–16)
APPEARANCE FLUID: NORMAL
BASOPHILS # BLD: 0 K/UL (ref 0–0.2)
BASOPHILS NFR BLD: 0.2 %
BDY FLUID QUALITY: NORMAL
BUN SERPL-MCNC: 53 MG/DL (ref 7–20)
C DIFF TOX A+B STL QL IA: NORMAL
CALCIUM SERPL-MCNC: 8.3 MG/DL (ref 8.3–10.6)
CELL COUNT FLUID TYPE: NORMAL
CHLORIDE SERPL-SCNC: 102 MMOL/L (ref 99–110)
CO2 SERPL-SCNC: 17 MMOL/L (ref 21–32)
COLOR FLUID: NORMAL
CREAT SERPL-MCNC: 1.6 MG/DL (ref 0.8–1.3)
DEPRECATED RDW RBC AUTO: 14.5 % (ref 12.4–15.4)
EOSINOPHIL # BLD: 0 K/UL (ref 0–0.6)
EOSINOPHIL NFR BLD: 0.2 %
GFR SERPLBLD CREATININE-BSD FMLA CKD-EPI: 47 ML/MIN/{1.73_M2}
GLUCOSE SERPL-MCNC: 91 MG/DL (ref 70–99)
HCT VFR BLD AUTO: 43.1 % (ref 40.5–52.5)
HGB BLD-MCNC: 14.7 G/DL (ref 13.5–17.5)
LYMPHOCYTES # BLD: 0.3 K/UL (ref 1–5.1)
LYMPHOCYTES NFR BLD: 4.3 %
LYMPHOCYTES NFR FLD: 6 %
MACROPHAGES # FLD: 83 %
MCH RBC QN AUTO: 35.4 PG (ref 26–34)
MCHC RBC AUTO-ENTMCNC: 34.1 G/DL (ref 31–36)
MCV RBC AUTO: 103.8 FL (ref 80–100)
MESOTHL CELL NFR FLD: 2 %
MONOCYTES # BLD: 0.5 K/UL (ref 0–1.3)
MONOCYTES NFR BLD: 7.2 %
NEUTROPHIL, FLUID: 9 %
NEUTROPHILS # BLD: 6.3 K/UL (ref 1.7–7.7)
NEUTROPHILS NFR BLD: 88.1 %
NUC CELL # FLD: 150 /CUMM
PATH REV: YES
PLATELET # BLD AUTO: 98 K/UL (ref 135–450)
PLATELET BLD QL SMEAR: ABNORMAL
PMV BLD AUTO: 9 FL (ref 5–10.5)
POIKILOCYTOSIS BLD QL SMEAR: ABNORMAL
POLYCHROMASIA BLD QL SMEAR: ABNORMAL
POTASSIUM SERPL-SCNC: 5.1 MMOL/L (ref 3.5–5.1)
RBC # BLD AUTO: 4.15 M/UL (ref 4.2–5.9)
RBC FLUID: 1100 /CUMM
SLIDE REVIEW: ABNORMAL
SODIUM SERPL-SCNC: 127 MMOL/L (ref 136–145)
TOTAL CELLS COUNTED FLD: 100
WBC # BLD AUTO: 7.1 K/UL (ref 4–11)

## 2025-03-17 PROCEDURE — 87324 CLOSTRIDIUM AG IA: CPT

## 2025-03-17 PROCEDURE — 88305 TISSUE EXAM BY PATHOLOGIST: CPT

## 2025-03-17 PROCEDURE — 2060000000 HC ICU INTERMEDIATE R&B

## 2025-03-17 PROCEDURE — 87449 NOS EACH ORGANISM AG IA: CPT

## 2025-03-17 PROCEDURE — 87070 CULTURE OTHR SPECIMN AEROBIC: CPT

## 2025-03-17 PROCEDURE — 36415 COLL VENOUS BLD VENIPUNCTURE: CPT

## 2025-03-17 PROCEDURE — 80048 BASIC METABOLIC PNL TOTAL CA: CPT

## 2025-03-17 PROCEDURE — 99223 1ST HOSP IP/OBS HIGH 75: CPT | Performed by: INTERNAL MEDICINE

## 2025-03-17 PROCEDURE — 6360000002 HC RX W HCPCS: Performed by: INTERNAL MEDICINE

## 2025-03-17 PROCEDURE — 2500000003 HC RX 250 WO HCPCS

## 2025-03-17 PROCEDURE — 6370000000 HC RX 637 (ALT 250 FOR IP)

## 2025-03-17 PROCEDURE — 51798 US URINE CAPACITY MEASURE: CPT

## 2025-03-17 PROCEDURE — 89051 BODY FLUID CELL COUNT: CPT

## 2025-03-17 PROCEDURE — P9041 ALBUMIN (HUMAN),5%, 50ML: HCPCS | Performed by: INTERNAL MEDICINE

## 2025-03-17 PROCEDURE — 0W9G3ZZ DRAINAGE OF PERITONEAL CAVITY, PERCUTANEOUS APPROACH: ICD-10-PCS | Performed by: INTERNAL MEDICINE

## 2025-03-17 PROCEDURE — 82042 OTHER SOURCE ALBUMIN QUAN EA: CPT

## 2025-03-17 PROCEDURE — 6370000000 HC RX 637 (ALT 250 FOR IP): Performed by: PHYSICIAN ASSISTANT

## 2025-03-17 PROCEDURE — 84157 ASSAY OF PROTEIN OTHER: CPT

## 2025-03-17 PROCEDURE — 85025 COMPLETE CBC W/AUTO DIFF WBC: CPT

## 2025-03-17 PROCEDURE — 87205 SMEAR GRAM STAIN: CPT

## 2025-03-17 PROCEDURE — 1200000000 HC SEMI PRIVATE

## 2025-03-17 PROCEDURE — 88112 CYTOPATH CELL ENHANCE TECH: CPT

## 2025-03-17 PROCEDURE — P9047 ALBUMIN (HUMAN), 25%, 50ML: HCPCS | Performed by: INTERNAL MEDICINE

## 2025-03-17 PROCEDURE — 49083 ABD PARACENTESIS W/IMAGING: CPT

## 2025-03-17 RX ORDER — SODIUM CHLORIDE 0.9 % (FLUSH) 0.9 %
5-40 SYRINGE (ML) INJECTION EVERY 12 HOURS SCHEDULED
Status: CANCELLED | OUTPATIENT
Start: 2025-03-17

## 2025-03-17 RX ORDER — METOPROLOL SUCCINATE 25 MG/1
25 TABLET, EXTENDED RELEASE ORAL NIGHTLY
Status: DISCONTINUED | OUTPATIENT
Start: 2025-03-17 | End: 2025-03-18 | Stop reason: HOSPADM

## 2025-03-17 RX ORDER — MULTIVITAMIN WITH IRON
500 TABLET ORAL DAILY
Status: DISCONTINUED | OUTPATIENT
Start: 2025-03-17 | End: 2025-03-18 | Stop reason: HOSPADM

## 2025-03-17 RX ORDER — MULTIVITAMIN WITH IRON
1 TABLET ORAL DAILY
Status: DISCONTINUED | OUTPATIENT
Start: 2025-03-17 | End: 2025-03-18 | Stop reason: HOSPADM

## 2025-03-17 RX ORDER — LANOLIN ALCOHOL/MO/W.PET/CERES
100 CREAM (GRAM) TOPICAL DAILY
Status: DISCONTINUED | OUTPATIENT
Start: 2025-03-17 | End: 2025-03-18 | Stop reason: HOSPADM

## 2025-03-17 RX ORDER — SODIUM CHLORIDE, SODIUM LACTATE, POTASSIUM CHLORIDE, CALCIUM CHLORIDE 600; 310; 30; 20 MG/100ML; MG/100ML; MG/100ML; MG/100ML
INJECTION, SOLUTION INTRAVENOUS CONTINUOUS
Status: CANCELLED | OUTPATIENT
Start: 2025-03-17

## 2025-03-17 RX ORDER — FUROSEMIDE 40 MG/1
40 TABLET ORAL DAILY
Status: DISCONTINUED | OUTPATIENT
Start: 2025-03-17 | End: 2025-03-18 | Stop reason: HOSPADM

## 2025-03-17 RX ORDER — TAMSULOSIN HYDROCHLORIDE 0.4 MG/1
0.4 CAPSULE ORAL DAILY
Status: DISCONTINUED | OUTPATIENT
Start: 2025-03-17 | End: 2025-03-18 | Stop reason: HOSPADM

## 2025-03-17 RX ORDER — LIDOCAINE HYDROCHLORIDE 10 MG/ML
0.3 INJECTION, SOLUTION EPIDURAL; INFILTRATION; INTRACAUDAL; PERINEURAL
Status: CANCELLED | OUTPATIENT
Start: 2025-03-17 | End: 2025-03-18

## 2025-03-17 RX ORDER — SPIRONOLACTONE 25 MG/1
50 TABLET ORAL DAILY
Status: DISCONTINUED | OUTPATIENT
Start: 2025-03-17 | End: 2025-03-18 | Stop reason: HOSPADM

## 2025-03-17 RX ORDER — SODIUM CHLORIDE 0.9 % (FLUSH) 0.9 %
5-40 SYRINGE (ML) INJECTION PRN
Status: CANCELLED | OUTPATIENT
Start: 2025-03-17

## 2025-03-17 RX ORDER — ALBUMIN HUMAN 50 G/1000ML
25 SOLUTION INTRAVENOUS EVERY 6 HOURS
Status: DISCONTINUED | OUTPATIENT
Start: 2025-03-17 | End: 2025-03-18

## 2025-03-17 RX ORDER — FINASTERIDE 5 MG/1
5 TABLET, FILM COATED ORAL DAILY
Status: DISCONTINUED | OUTPATIENT
Start: 2025-03-17 | End: 2025-03-18 | Stop reason: HOSPADM

## 2025-03-17 RX ORDER — SODIUM CHLORIDE 9 MG/ML
INJECTION, SOLUTION INTRAVENOUS PRN
Status: CANCELLED | OUTPATIENT
Start: 2025-03-17

## 2025-03-17 RX ORDER — ALBUMIN (HUMAN) 12.5 G/50ML
25 SOLUTION INTRAVENOUS ONCE
Status: COMPLETED | OUTPATIENT
Start: 2025-03-17 | End: 2025-03-17

## 2025-03-17 RX ORDER — MIDODRINE HYDROCHLORIDE 5 MG/1
5 TABLET ORAL
Status: DISCONTINUED | OUTPATIENT
Start: 2025-03-17 | End: 2025-03-18

## 2025-03-17 RX ADMIN — MIDODRINE HYDROCHLORIDE 5 MG: 5 TABLET ORAL at 18:21

## 2025-03-17 RX ADMIN — METOPROLOL SUCCINATE 25 MG: 25 TABLET, EXTENDED RELEASE ORAL at 20:20

## 2025-03-17 RX ADMIN — FLUOXETINE HYDROCHLORIDE 40 MG: 20 CAPSULE ORAL at 08:58

## 2025-03-17 RX ADMIN — THERA TABS 1 TABLET: TAB at 08:57

## 2025-03-17 RX ADMIN — Medication 500 MCG: at 08:57

## 2025-03-17 RX ADMIN — ALBUMIN (HUMAN) 25 G: 0.25 INJECTION, SOLUTION INTRAVENOUS at 11:21

## 2025-03-17 RX ADMIN — ALBUMIN (HUMAN) 25 G: 12.5 INJECTION, SOLUTION INTRAVENOUS at 17:49

## 2025-03-17 RX ADMIN — MIDODRINE HYDROCHLORIDE 5 MG: 5 TABLET ORAL at 11:21

## 2025-03-17 RX ADMIN — Medication 100 MG: at 08:58

## 2025-03-17 RX ADMIN — TAMSULOSIN HYDROCHLORIDE 0.4 MG: 0.4 CAPSULE ORAL at 18:20

## 2025-03-17 RX ADMIN — SODIUM CHLORIDE, PRESERVATIVE FREE 5 ML: 5 INJECTION INTRAVENOUS at 09:01

## 2025-03-17 RX ADMIN — SODIUM CHLORIDE, PRESERVATIVE FREE 10 ML: 5 INJECTION INTRAVENOUS at 20:21

## 2025-03-17 RX ADMIN — FINASTERIDE 5 MG: 5 TABLET, FILM COATED ORAL at 08:57

## 2025-03-17 ASSESSMENT — LIFESTYLE VARIABLES: HOW OFTEN DO YOU HAVE A DRINK CONTAINING ALCOHOL: NEVER

## 2025-03-17 NOTE — CONSULTS
GASTROENTEROLOGY INPATIENT CONSULTATION        IDENTIFYING DATA/REASON FOR CONSULTATION   PATIENT:  Enoc Mancia  MRN:  [id number]  ADMIT DATE: 3/16/2025  TIME OF EVALUATION: 3/17/2025 4:02 PM  HOSPITAL STAY:   LOS: 1 day     REASON FOR CONSULTATION:  Cirrhosis, ascites    HISTORY OF PRESENT ILLNESS   Enoc Mancia is a 69 y.o. male with a PMH of ETOH related cirrhosis, rheumatoid arthritis on Xeljanz who presented on 3/16/2025 with abnormal labs.        Patient previously diagnosed with alcohol related liver disease in 2021 and told to completely abstain.  Patient reports his last drink of alcohol was 3-4 weeks ago. He reports intermittent black stools since his recent admission. He was on diuretics at home taking Lasix 40mg and aldactone 50mg. Upon arrival found with Cr 1.6. Na 127. He reports diarrhea. He reports abdominal discomfort from returning ascites but no overt abdominal pain. No hematochezia, nausea, vomiting. The patient denies any symptoms consistent with encephalopathy. He is reporting issues with urinating. He feels the need to go but unable to empty his bladder. Last paracentesis was on 3/6 with 3.7L removed with no SBP on fluid studies.     CT 3/6/2025 with moderate ascites. Evidence of cirrhosis. Stable 1.2 cm hepatic dome oval circumscribed hypodense mass compared to prior CT 2012 consistent with a benign process.    Prior Endoscopic Evaluations:  Colonoscopy 1/2021 with Dr. Talley:  -Small internal hemorrhoids. Otherwise normal colonoscopy to the cecum.  -Repeat colonoscopy 5 years due to prior history of colon polyps.    EGD and Colonoscopy 2013: colon polyps removed, hiatus hernia     PAST MEDICAL, SURGICAL, FAMILY, and SOCIAL HISTORY     Past Medical History:   Diagnosis Date    Acid reflux     Arthritis     rheumatoid    Cirrhosis (HCC)     Depression     Enlarged prostate     Hearing decreased     does not wear his hearing aid    Hyperlipidemia     no meds    Hypertension

## 2025-03-17 NOTE — CONSULTS
Thank you to requesting provider:  Nataly Smith PA   , for asking us to see Enoc Mancia  Reason for consultation:   Acute Kidney Injury   Chief Complaint:  Sent in for labs, intermittent black stools     History of Presenting Illness      68 y/o with history of alcohol abuse and cirrhosis admitted with weakness, NASRA, and intermittent black stools.  He says he is very dehydrated as he has been on a fluid restriction and he has been on diuretics with diarrhea.  No LE edema.  He did get a paracentesis today with 3 liters drained.  Not feeling well.        Past Medical/Surgical History      Active Ambulatory Problems     Diagnosis Date Noted    Muscular weakness 09/27/2012    Depression     Acid reflux     Hyperlipidemia     Hypertension     Muscle pain     Hearing decreased     Arthritis     Benign prostatic hyperplasia without lower urinary tract symptoms     Chronic fatigue 09/27/2018    Dizziness 09/27/2018    Palpitations 10/01/2018    Rheumatoid arthritis (HCC)     History of tobacco abuse 12/16/2020    Hx of colonic polyps 01/18/2021    Fatty liver     Chronic liver disease due to alcohol 06/01/2021    Recurrent major depressive disorder, in full remission 01/16/2023    Addiction, marijuana (HCC) 01/22/2025    Ascites due to alcoholic cirrhosis (HCC) 03/06/2025    Alcohol abuse 03/06/2025    Pericardial effusion 03/07/2025     Resolved Ambulatory Problems     Diagnosis Date Noted    Chest pain 06/19/2019    Hypertensive emergency     Severe episode of recurrent major depressive disorder, without psychotic features (HCC) 01/22/2025    Seizure (HCC) 01/22/2025     Past Medical History:   Diagnosis Date    Cirrhosis (HCC)     Enlarged prostate     Liver disease          Review of Systems     Constitutional:  No weight loss, no fever/chills  Eyes:  No eye pain, no eye redness  Cardiovascular:  No chest pain, no worsening of edema  Respiratory:  No hemoptysis, no stridor  Gastrointestinal:  Dark stools, no

## 2025-03-17 NOTE — ED NOTES
Patient ambulated to bathroom with steady gait, returned to stretcher, denies other needs at this time.

## 2025-03-17 NOTE — PLAN OF CARE
Problem: Discharge Planning  Goal: Discharge to home or other facility with appropriate resources  Outcome: Progressing  Flowsheets (Taken 3/17/2025 0447 by Kianna Nunez RN)  Discharge to home or other facility with appropriate resources:   Identify barriers to discharge with patient and caregiver   Identify discharge learning needs (meds, wound care, etc)   Refer to discharge planning if patient needs post-hospital services based on physician order or complex needs related to functional status, cognitive ability or social support system   Arrange for interpreters to assist at discharge as needed   Arrange for needed discharge resources and transportation as appropriate     Problem: Safety - Adult  Goal: Free from fall injury  Outcome: Progressing

## 2025-03-17 NOTE — CONSULTS
[facility]  Palliative Medicine Consultation Note      Date Of Admission:3/16/2025  Date of consult: 03/17/25  Seen by PC in the past:  No    Recommendations:        Writer met with the pt and pt's spouse, Dara at bedside to introduce palliative/hospice options and had a voluntary discussion related to advance care planning. Pt is not in hospice mindset. Plan continues home with spouse at [place]. Pt agreeable for HTP to follow along at [place] and referral sent to April with [facility].    1. Goals of Care/Advanced Care planning/Code status: writer did address code status and pt elects to remain a Full code at this time.  2. Pain: denies  3. SOB: denies  4. Disposition: home with spouse and HTP program    Reason for Consult:         [x]  Goals of Care  []  Code Status Discussion/Advanced Care Planning   []  Psychosocial/Family Support  []  Symptom Management  []  Other (Specify)    Requesting Physician: Dr. Harvey    CHIEF COMPLAINT:  NASRA, ascites due to alcoholic cirrhosis    History Obtained From:  patient    History of Present Illness:         Enoc Mancia is a 69 y.o. male with PMH of (see below list) who presented with NASRA, lab evaluation done by GI doctor and pt's creatinine is nearly doubled on admission and pt reported decreased po intake and poor appetite. Pt with pmh of alcohol abuse and cirrhosis. Pt reports weakness and says he is very dehydrated r/t fluid restriction and on diuretics with diarrhea. Pt did get a paracentesis today with 3 liters drained.    Subjective:         Past Medical History:        Diagnosis Date    Acid reflux     Arthritis     rheumatoid    Cirrhosis (HCC)     Depression     Enlarged prostate     Hearing decreased     does not wear his hearing aid    Hyperlipidemia     no meds    Hypertension     Liver disease     Stage 2 per pt     Muscle pain     Rheumatoid arthritis (HCC)     Seizure (HCC) 01/22/2025       Past Surgical History:        Procedure Laterality Date

## 2025-03-17 NOTE — CARE COORDINATION
Case Management Assessment  Initial Evaluation    Date/Time of Evaluation: 3/17/2025 7:49 AM  Assessment Completed by: Saniya Irizarry RN    If patient is discharged prior to next notation, then this note serves as note for discharge by case management.    Patient Name: Enoc Mancia                   YOB: 1955  Diagnosis: NASRA (acute kidney injury) [N17.9]  Ascites due to alcoholic cirrhosis (HCC) [K70.31]                   Date / Time: 3/16/2025  4:04 PM    Patient Admission Status: Inpatient   Readmission Risk (Low < 19, Mod (19-27), High > 27): Readmission Risk Score: 17    Current PCP: Sofia Whitt, SATISH - CNP  PCP verified by CM? Yes (spouse)    Chart Reviewed: Yes      History Provided by: Patient  Patient Orientation: Alert and Oriented    Patient Cognition: Alert    Hospitalization in the last 30 days (Readmission):  Yes    If yes, Readmission Assessment in CM Navigator will be completed.    Advance Directives:      Code Status: Full Code   Patient's Primary Decision Maker is: Legal Next of Kin    Primary Decision Maker: Dara Mancia - Spouse - [phone]    Discharge Planning:    Patient lives with: Spouse/Significant Other Type of Home: House  Primary Care Giver: Self  Patient Support Systems include: Spouse/Significant Other   Current Financial resources: Medicare  Current community resources: None  Current services prior to admission: Durable Medical Equipment            Current DME: Cane, Walker, Wheelchair            Type of Home Care services:  None    ADLS  Prior functional level: Independent in ADLs/IADLs  Current functional level: Independent in ADLs/IADLs    PT AM-PAC:   /24  OT AM-PAC:   /24    Family can provide assistance at DC: Yes  Would you like Case Management to discuss the discharge plan with any other family members/significant others, and if so, who? No  Plans to Return to Present Housing: Yes  Other Identified Issues/Barriers to RETURNING to current housing:

## 2025-03-17 NOTE — ACP (ADVANCE CARE PLANNING)
Advance Care Planning     General Advance Care Planning (ACP) Conversation    Date of Conversation: 3/17/2025  Conducted with: Patient with Decision Making Capacity  Other persons present: None    Healthcare Decision Maker:   Primary Decision Maker: Draa Mancia - [facility] - [phone]       Content/Action Overview:  DECLINED ACP Conversation - will revisit periodically  Reviewed DNR/DNI and patient elects Full Code (Attempt Resuscitation)        Length of Voluntary ACP Conversation in minutes:  <16 minutes (Non-Billable)    Saniya Irizarry RN

## 2025-03-18 ENCOUNTER — ANESTHESIA (OUTPATIENT)
Dept: ENDOSCOPY | Age: 70
DRG: 433 | End: 2025-03-18
Payer: MEDICARE

## 2025-03-18 VITALS
HEIGHT: 62 IN | TEMPERATURE: 97.6 F | RESPIRATION RATE: 16 BRPM | WEIGHT: 158.6 LBS | HEART RATE: 80 BPM | OXYGEN SATURATION: 95 % | SYSTOLIC BLOOD PRESSURE: 138 MMHG | BODY MASS INDEX: 29.19 KG/M2 | DIASTOLIC BLOOD PRESSURE: 77 MMHG

## 2025-03-18 LAB
ALBUMIN SERPL-MCNC: 2.8 G/DL (ref 3.4–5)
ANION GAP SERPL CALCULATED.3IONS-SCNC: 10 MMOL/L (ref 3–16)
BASOPHILS # BLD: 0 K/UL (ref 0–0.2)
BASOPHILS NFR BLD: 0.4 %
BUN SERPL-MCNC: 46 MG/DL (ref 7–20)
CALCIUM SERPL-MCNC: 8.1 MG/DL (ref 8.3–10.6)
CHLORIDE SERPL-SCNC: 107 MMOL/L (ref 99–110)
CO2 SERPL-SCNC: 18 MMOL/L (ref 21–32)
CREAT SERPL-MCNC: 1.3 MG/DL (ref 0.8–1.3)
DEPRECATED RDW RBC AUTO: 14.1 % (ref 12.4–15.4)
EOSINOPHIL # BLD: 0 K/UL (ref 0–0.6)
EOSINOPHIL NFR BLD: 0.3 %
GFR SERPLBLD CREATININE-BSD FMLA CKD-EPI: 60 ML/MIN/{1.73_M2}
GLUCOSE BLD-MCNC: 111 MG/DL (ref 70–99)
GLUCOSE BLD-MCNC: 60 MG/DL (ref 70–99)
GLUCOSE SERPL-MCNC: 79 MG/DL (ref 70–99)
HBV SURFACE AB SERPL IA-ACNC: 5.31 MIU/ML
HCT VFR BLD AUTO: 37.5 % (ref 40.5–52.5)
HCT VFR BLD AUTO: 39.3 % (ref 40.5–52.5)
HGB BLD-MCNC: 12.9 G/DL (ref 13.5–17.5)
HGB BLD-MCNC: 13.4 G/DL (ref 13.5–17.5)
LYMPHOCYTES # BLD: 0.3 K/UL (ref 1–5.1)
LYMPHOCYTES NFR BLD: 5.4 %
MCH RBC QN AUTO: 35.5 PG (ref 26–34)
MCHC RBC AUTO-ENTMCNC: 34.3 G/DL (ref 31–36)
MCV RBC AUTO: 103.5 FL (ref 80–100)
MONOCYTES # BLD: 0.4 K/UL (ref 0–1.3)
MONOCYTES NFR BLD: 6.8 %
NEUTROPHILS # BLD: 4.6 K/UL (ref 1.7–7.7)
NEUTROPHILS NFR BLD: 87.1 %
PATH CONSULT FLUID: NORMAL
PERFORMED ON: ABNORMAL
PERFORMED ON: ABNORMAL
PHOSPHATE SERPL-MCNC: 2.5 MG/DL (ref 2.5–4.9)
PLATELET # BLD AUTO: 84 K/UL (ref 135–450)
PMV BLD AUTO: 9.5 FL (ref 5–10.5)
POTASSIUM SERPL-SCNC: 3.9 MMOL/L (ref 3.5–5.1)
POTASSIUM SERPL-SCNC: 3.9 MMOL/L (ref 3.5–5.1)
PROT FLD-MCNC: 0.9 G/DL
RBC # BLD AUTO: 3.62 M/UL (ref 4.2–5.9)
SODIUM SERPL-SCNC: 135 MMOL/L (ref 136–145)
SPECIMEN SOURCE FLD: NORMAL
WBC # BLD AUTO: 5.3 K/UL (ref 4–11)

## 2025-03-18 PROCEDURE — 36415 COLL VENOUS BLD VENIPUNCTURE: CPT

## 2025-03-18 PROCEDURE — 3700000000 HC ANESTHESIA ATTENDED CARE: Performed by: INTERNAL MEDICINE

## 2025-03-18 PROCEDURE — 85014 HEMATOCRIT: CPT

## 2025-03-18 PROCEDURE — 0DB98ZX EXCISION OF DUODENUM, VIA NATURAL OR ARTIFICIAL OPENING ENDOSCOPIC, DIAGNOSTIC: ICD-10-PCS | Performed by: INTERNAL MEDICINE

## 2025-03-18 PROCEDURE — 2580000003 HC RX 258: Performed by: INTERNAL MEDICINE

## 2025-03-18 PROCEDURE — 86706 HEP B SURFACE ANTIBODY: CPT

## 2025-03-18 PROCEDURE — 99238 HOSP IP/OBS DSCHRG MGMT 30/<: CPT | Performed by: INTERNAL MEDICINE

## 2025-03-18 PROCEDURE — 7100000011 HC PHASE II RECOVERY - ADDTL 15 MIN: Performed by: INTERNAL MEDICINE

## 2025-03-18 PROCEDURE — 2709999900 HC NON-CHARGEABLE SUPPLY: Performed by: INTERNAL MEDICINE

## 2025-03-18 PROCEDURE — 6370000000 HC RX 637 (ALT 250 FOR IP): Performed by: PHYSICIAN ASSISTANT

## 2025-03-18 PROCEDURE — 6370000000 HC RX 637 (ALT 250 FOR IP)

## 2025-03-18 PROCEDURE — 6360000002 HC RX W HCPCS: Performed by: NURSE ANESTHETIST, CERTIFIED REGISTERED

## 2025-03-18 PROCEDURE — P9045 ALBUMIN (HUMAN), 5%, 250 ML: HCPCS | Performed by: INTERNAL MEDICINE

## 2025-03-18 PROCEDURE — 86708 HEPATITIS A ANTIBODY: CPT

## 2025-03-18 PROCEDURE — 85025 COMPLETE CBC W/AUTO DIFF WBC: CPT

## 2025-03-18 PROCEDURE — 85018 HEMOGLOBIN: CPT

## 2025-03-18 PROCEDURE — 88305 TISSUE EXAM BY PATHOLOGIST: CPT

## 2025-03-18 PROCEDURE — 7100000010 HC PHASE II RECOVERY - FIRST 15 MIN: Performed by: INTERNAL MEDICINE

## 2025-03-18 PROCEDURE — 2500000003 HC RX 250 WO HCPCS

## 2025-03-18 PROCEDURE — 80069 RENAL FUNCTION PANEL: CPT

## 2025-03-18 PROCEDURE — 3609012400 HC EGD TRANSORAL BIOPSY SINGLE/MULTIPLE: Performed by: INTERNAL MEDICINE

## 2025-03-18 PROCEDURE — 6360000002 HC RX W HCPCS: Performed by: INTERNAL MEDICINE

## 2025-03-18 RX ORDER — SODIUM CHLORIDE 9 MG/ML
INJECTION, SOLUTION INTRAVENOUS PRN
Status: DISCONTINUED | OUTPATIENT
Start: 2025-03-18 | End: 2025-03-18 | Stop reason: HOSPADM

## 2025-03-18 RX ORDER — SODIUM CHLORIDE 0.9 % (FLUSH) 0.9 %
5-40 SYRINGE (ML) INJECTION PRN
Status: DISCONTINUED | OUTPATIENT
Start: 2025-03-18 | End: 2025-03-18 | Stop reason: HOSPADM

## 2025-03-18 RX ORDER — DIPHENHYDRAMINE HYDROCHLORIDE 50 MG/ML
12.5 INJECTION, SOLUTION INTRAMUSCULAR; INTRAVENOUS
Status: DISCONTINUED | OUTPATIENT
Start: 2025-03-18 | End: 2025-03-18 | Stop reason: HOSPADM

## 2025-03-18 RX ORDER — LABETALOL HYDROCHLORIDE 5 MG/ML
10 INJECTION, SOLUTION INTRAVENOUS
Status: DISCONTINUED | OUTPATIENT
Start: 2025-03-18 | End: 2025-03-18 | Stop reason: HOSPADM

## 2025-03-18 RX ORDER — OXYCODONE HYDROCHLORIDE 5 MG/1
5 TABLET ORAL PRN
Status: DISCONTINUED | OUTPATIENT
Start: 2025-03-18 | End: 2025-03-18 | Stop reason: HOSPADM

## 2025-03-18 RX ORDER — MEPERIDINE HYDROCHLORIDE 25 MG/ML
12.5 INJECTION INTRAMUSCULAR; INTRAVENOUS; SUBCUTANEOUS EVERY 5 MIN PRN
Status: DISCONTINUED | OUTPATIENT
Start: 2025-03-18 | End: 2025-03-18 | Stop reason: HOSPADM

## 2025-03-18 RX ORDER — NALOXONE HYDROCHLORIDE 0.4 MG/ML
INJECTION, SOLUTION INTRAMUSCULAR; INTRAVENOUS; SUBCUTANEOUS PRN
Status: DISCONTINUED | OUTPATIENT
Start: 2025-03-18 | End: 2025-03-18 | Stop reason: HOSPADM

## 2025-03-18 RX ORDER — PROPOFOL 10 MG/ML
INJECTION, EMULSION INTRAVENOUS
Status: DISCONTINUED | OUTPATIENT
Start: 2025-03-18 | End: 2025-03-18 | Stop reason: SDUPTHER

## 2025-03-18 RX ORDER — LIDOCAINE HYDROCHLORIDE 20 MG/ML
INJECTION, SOLUTION INFILTRATION; PERINEURAL
Status: DISCONTINUED | OUTPATIENT
Start: 2025-03-18 | End: 2025-03-18 | Stop reason: SDUPTHER

## 2025-03-18 RX ORDER — GLUCAGON 1 MG/ML
1 KIT INJECTION PRN
Status: DISCONTINUED | OUTPATIENT
Start: 2025-03-18 | End: 2025-03-18 | Stop reason: HOSPADM

## 2025-03-18 RX ORDER — DEXTROSE MONOHYDRATE 100 MG/ML
INJECTION, SOLUTION INTRAVENOUS CONTINUOUS PRN
Status: DISCONTINUED | OUTPATIENT
Start: 2025-03-18 | End: 2025-03-18 | Stop reason: HOSPADM

## 2025-03-18 RX ORDER — SODIUM CHLORIDE 0.9 % (FLUSH) 0.9 %
5-40 SYRINGE (ML) INJECTION EVERY 12 HOURS SCHEDULED
Status: DISCONTINUED | OUTPATIENT
Start: 2025-03-18 | End: 2025-03-18 | Stop reason: HOSPADM

## 2025-03-18 RX ORDER — ONDANSETRON 2 MG/ML
4 INJECTION INTRAMUSCULAR; INTRAVENOUS
Status: DISCONTINUED | OUTPATIENT
Start: 2025-03-18 | End: 2025-03-18 | Stop reason: HOSPADM

## 2025-03-18 RX ORDER — OXYCODONE HYDROCHLORIDE 5 MG/1
10 TABLET ORAL PRN
Status: DISCONTINUED | OUTPATIENT
Start: 2025-03-18 | End: 2025-03-18 | Stop reason: HOSPADM

## 2025-03-18 RX ADMIN — LIDOCAINE HYDROCHLORIDE 60 MG: 20 INJECTION, SOLUTION INFILTRATION; PERINEURAL at 10:59

## 2025-03-18 RX ADMIN — FLUOXETINE HYDROCHLORIDE 40 MG: 20 CAPSULE ORAL at 09:19

## 2025-03-18 RX ADMIN — ALBUMIN (HUMAN) 25 G: 12.5 INJECTION, SOLUTION INTRAVENOUS at 00:24

## 2025-03-18 RX ADMIN — DEXTROSE MONOHYDRATE 125 ML: 100 INJECTION, SOLUTION INTRAVENOUS at 07:54

## 2025-03-18 RX ADMIN — PROPOFOL 200 MG: 10 INJECTION, EMULSION INTRAVENOUS at 10:59

## 2025-03-18 RX ADMIN — SODIUM CHLORIDE, PRESERVATIVE FREE 10 ML: 5 INJECTION INTRAVENOUS at 09:19

## 2025-03-18 RX ADMIN — FINASTERIDE 5 MG: 5 TABLET, FILM COATED ORAL at 09:19

## 2025-03-18 RX ADMIN — MIDODRINE HYDROCHLORIDE 5 MG: 5 TABLET ORAL at 09:19

## 2025-03-18 RX ADMIN — ALBUMIN (HUMAN) 25 G: 12.5 INJECTION, SOLUTION INTRAVENOUS at 06:02

## 2025-03-18 NOTE — ANESTHESIA PRE PROCEDURE
Department of Anesthesiology  Preprocedure Note       Name:  Enoc Mancia   Age:  69 y.o.  :  1955                                          MRN:  [id number]         Date:  3/18/2025      Surgeon: Surgeon(s):  Blaze Rondon DO    Procedure: Procedure(s):  EGD W/ANES.    Medications prior to admission:   Prior to Admission medications    Medication Sig Start Date End Date Taking? Authorizing Provider   spironolactone (ALDACTONE) 50 MG tablet Take 1 tablet by mouth daily 3/8/25  Yes Black, Maria R, DO   Multiple Vitamin (MULTIVITAMIN) TABS tablet Take 1 tablet by mouth daily 3/8/25  Yes Black, Maria R, DO   furosemide (LASIX) 40 MG tablet Take 1 tablet by mouth daily 3/8/25  Yes Black, Maria R, DO   thiamine 100 MG tablet Take 1 tablet by mouth daily 3/8/25  Yes Sourav, Mari Ar, DO   potassium chloride (KLOR-CON 10) 10 MEQ extended release tablet Take 1 tablet by mouth daily 25  Yes Sofia Whitt APRN - CNP   metoprolol succinate (TOPROL XL) 25 MG extended release tablet Take 1 tablet by mouth nightly 25  Yes Sofia Whitt APRN - CNP   lisinopril (PRINIVIL;ZESTRIL) 30 MG tablet Take 0.5 tablets by mouth 2 times daily 25 Yes Sofia Whitt APRN - CNP   finasteride (PROSCAR) 5 MG tablet Take 1 tablet by mouth daily 25  Yes Sofia Whitt APRN - CNP   FLUoxetine (PROZAC) 40 MG capsule TAKE TWO CAPSULES BY MOUTH EVERY DAY 24  Yes Sofia Whitt APRN - CNP   omeprazole (PRILOSEC) 20 MG delayed release capsule TAKE 1 CAPSULE BY MOUTH DAILY 24  Yes Sofia Whitt APRN - CNP   tamsulosin (FLOMAX) 0.4 MG capsule TAKE ONE CAPSULE BY MOUTH EVERY DAY NIGHTLY 24  Yes Sofia Whitt APRN - CNP   Probiotic Product (CULTURELLE PROBIOTICS PO) Take by mouth daily   Yes Provider, MD Kiera   Cholecalciferol (VITAMIN D HIGH POTENCY PO) Take 500 mcg by mouth daily   Yes Provider, MD Kiera   vitamin B-12 (CYANOCOBALAMIN) 500 MCG tablet

## 2025-03-18 NOTE — H&P
[facility] Medicine History & Physical      PCP: Sofia Whitt, APRN - CNP    Date of Admission: 3/16/2025    Date of Service: Pt seen/examined on 03/17/25       Chief Complaint:    Chief Complaint   Patient presents with    Abnormal Lab     Pt states that he was called by his provider telling him that his kidney labs are elevated.         History Of Present Illness:      The patient is a 69 y.o. male with PMHx of liver cirrhosis, HTN, HLD, BPH, alcohol abuse who presents to [facility] for abdominal pain and outpatient labs with elevated creatinine. History obtained from the patient and review of EMR.     Past Medical History:        Diagnosis Date    Acid reflux     Arthritis     rheumatoid    Cirrhosis (HCC)     Depression     Enlarged prostate     Hearing decreased     does not wear his hearing aid    Hyperlipidemia     no meds    Hypertension     Liver disease     Stage 2 per pt     Muscle pain     Rheumatoid arthritis (HCC)     Seizure (HCC) 01/22/2025       Past Surgical History:        Procedure Laterality Date    APPENDECTOMY      CARDIAC CATHETERIZATION      COLONOSCOPY  7/25/2013    polyps    COLONOSCOPY  01/18/2021    COLONOSCOPY N/A 1/18/2021    COLON W/ANES. (9:00) **FIBROSCAN TOO** performed by Valeriy Talley MD at [facility] ENDOSCOPY    CYSTOSCOPY      OTHER SURGICAL HISTORY  09/27/2012    muscle biopsy left shoulder    UPPER GASTROINTESTINAL ENDOSCOPY  7/25/2013    dysphagia    WRIST SURGERY      left       Medications Prior to Admission:    Prior to Admission medications    Medication Sig Start Date End Date Taking? Authorizing Provider   spironolactone (ALDACTONE) 50 MG tablet Take 1 tablet by mouth daily 3/8/25  Yes Black, Maria R, DO   Multiple Vitamin (MULTIVITAMIN) TABS tablet Take 1 tablet by mouth daily 3/8/25  Yes Black, Maria R, DO   furosemide (LASIX) 40 MG tablet Take 1 tablet by mouth daily 3/8/25  Yes Black, Maria R, DO   thiamine 100 MG tablet Take 1 tablet by mouth daily 3/8/25 
present nowConfirmed by CATIA TEAGUE MD ([id number]) on 3/7/2025 7:45:37 AM   Final    Troponin, High Sensitivity 03/06/2025 10  0 - 22 ng/L Final    SARS-CoV-2 RNA, RT PCR 03/06/2025 NOT DETECTED  NOT DETECTED Final    Influenza A 03/06/2025 NOT DETECTED  NOT DETECTED Final    Influenza B 03/06/2025 NOT DETECTED  NOT DETECTED Final    WBC 03/07/2025 5.9  4.0 - 11.0 K/uL Final    RBC 03/07/2025 3.90 (L)  4.20 - 5.90 M/uL Final    Hemoglobin 03/07/2025 14.2  13.5 - 17.5 g/dL Final    Hematocrit 03/07/2025 40.6  40.5 - 52.5 % Final    MCV 03/07/2025 104.0 (H)  80.0 - 100.0 fL Final    MCH 03/07/2025 36.3 (H)  26.0 - 34.0 pg Final    MCHC 03/07/2025 34.9  31.0 - 36.0 g/dL Final    RDW 03/07/2025 13.7  12.4 - 15.4 % Final    Platelets 03/07/2025 100 (L)  135 - 450 K/uL Final    MPV 03/07/2025 9.1  5.0 - 10.5 fL Final    Neutrophils % 03/07/2025 85.9  % Final    Lymphocytes % 03/07/2025 7.0  % Final    Monocytes % 03/07/2025 6.3  % Final    Eosinophils % 03/07/2025 0.3  % Final    Basophils % 03/07/2025 0.5  % Final    Neutrophils Absolute 03/07/2025 5.0  1.7 - 7.7 K/uL Final    Lymphocytes Absolute 03/07/2025 0.4 (L)  1.0 - 5.1 K/uL Final    Monocytes Absolute 03/07/2025 0.4  0.0 - 1.3 K/uL Final    Eosinophils Absolute 03/07/2025 0.0  0.0 - 0.6 K/uL Final    Basophils Absolute 03/07/2025 0.0  0.0 - 0.2 K/uL Final    Sodium 03/07/2025 132 (L)  136 - 145 mmol/L Final    Potassium reflex Magnesium 03/07/2025 4.0  3.5 - 5.1 mmol/L Final    Chloride 03/07/2025 103  99 - 110 mmol/L Final    CO2 03/07/2025 21  21 - 32 mmol/L Final    Anion Gap 03/07/2025 8  3 - 16 Final    Glucose 03/07/2025 64 (L)  70 - 99 mg/dL Final    BUN 03/07/2025 <2 (L)  7 - 20 mg/dL Final    Creatinine 03/07/2025 0.6 (L)  0.8 - 1.3 mg/dL Final    Est, Glom Filt Rate 03/07/2025 >90  >60 Final    Calcium 03/07/2025 7.7 (L)  8.3 - 10.6 mg/dL Final    Total Protein 03/07/2025 5.9 (L)  6.4 - 8.2 g/dL Final    Albumin 03/07/2025 2.4 (L)  3.4 - 5.0 g/dL Final

## 2025-03-18 NOTE — DISCHARGE SUMMARY
tablet  Commonly known as: ALDACTONE  Take 1 tablet by mouth daily     tamsulosin 0.4 MG capsule  Commonly known as: FLOMAX  TAKE ONE CAPSULE BY MOUTH EVERY DAY NIGHTLY     thiamine 100 MG tablet  Take 1 tablet by mouth daily     vitamin B-12 500 MCG tablet  Commonly known as: CYANOCOBALAMIN     VITAMIN D HIGH POTENCY PO            STOP taking these medications      furosemide 40 MG tablet  Commonly known as: LASIX     lisinopril 30 MG tablet  Commonly known as: PRINIVIL;ZESTRIL     ondansetron 4 MG tablet  Commonly known as: ZOFRAN     potassium chloride 10 MEQ extended release tablet  Commonly known as: Klor-Con 10     sildenafil 25 MG tablet  Commonly known as: VIAGRA                Discharge Condition/Location: Stable    Follow Up:  Follow up with PCP.        PATO GUERRERO MD 3/18/2025 1:09 PM

## 2025-03-18 NOTE — PROGRESS NOTES
Medicare Wellness  Personal Prevention Plan of Service     Date of Office Visit:    Encounter Provider:  Stanley Ruiz MD  Place of Service:  [facility] PRIMARY [facility]ARE  Patient Name: Timi Wolf  :  1945    As part of the Medicare Wellness portion of your visit today, we are providing you with this personalized preventive plan of services (PPPS). This plan is based upon recommendations of the [place] Preventive Services Task Force (USPSTF) and the Advisory Committee on Immunization Practices (ACIP).    This lists the preventive care services that should be considered, and provides dates of when you are due. Items listed as completed are up-to-date and do not require any further intervention.    Health Maintenance   Topic Date Due    RSV Vaccine - Adults (1 - 1-dose 60+ series) Never done    COVID-19 Vaccine (2023- season) 2024    BMI FOLLOWUP  2024    TDAP/TD VACCINES (3 - Td or Tdap) 2024    INFLUENZA VACCINE  2024    LIPID PANEL  2025    ANNUAL WELLNESS VISIT  2025    COLORECTAL CANCER SCREENING  2025    HEPATITIS C SCREENING  Completed    Pneumococcal Vaccine 65+  Completed    ZOSTER VACCINE  Completed       Orders Placed This Encounter   Procedures    PSA, Total & Free     Standing Status:   Future     Standing Expiration Date:   2025     Order Specific Question:   Release to patient     Answer:   Routine Release [[id number]]    Lipid Panel With / Chol / HDL Ratio     Standing Status:   Future     Standing Expiration Date:   2025     Order Specific Question:   Release to patient     Answer:   Routine Release [[id number]]       No follow-ups on file.          
BP (!) 127/90   Pulse 90   Temp 98.2 °F (36.8 °C) (Oral)   Resp 15   Ht 1.575 m (5' 2\")   Wt 75 kg (165 lb 5 oz)   SpO2 93%   BMI 30.24 kg/m²     Patient alert and oriented resting in bed, watching tv. With telemetry.  Assessment completed. Respiration easy, even and unlabored. Patient tolerated night meds well. Call light and bedside table within reach. Bed at lowest position, locked, side rails x2, bed alarm on. Patient informed to be NPO at midnight. Patient noted.  Also offered snack prior to NPO. Patient will notify this writer when he need a snack. Pt denies needs at this time.        
Bedside report and transfer of care given to Lana Rn. Pt currently resting in bed with the call light within reach. Pt denies any other care needs at this time. Pt stable at this time.   
Blood sugar 111 after D10 bolus.   
Blood sugar 60. Patient NPO for EGD today. D10 IV infusion initiated at this time.   
Consult called in to Nephrology. 3-17-25 @0944. Shakira Lopez  
Image guided Paracentesis completed.  3.2 liters of dark yellow colored withdrawn.  Pt tolerated procedure without any signs or symptoms of distress. Vital signs stable.     DISCHARGED:  ADMITTED: Pt transferred back to room 313-2. Report called to nurse.     SPECIMEN SENT:  Yes    Vital Signs  Vitals:    03/17/25 0733   BP: 123/83   Pulse: 90   Resp: 18   Temp: 97.9 °F (36.6 °C)   SpO2: 92%    (vital signs in table format)   
Patient admitted to room 313 bed 2 from er. Patient oriented to room, call light, bed rails, phone, lights and bathroom. Patient instructed about the schedule of the day including: vital sign frequency, lab draws, possible tests, frequency of MD and staff rounds, daily weights, I &O's and prescribed diet. 46 Telemetry box in place, patient aware of placement and reason. Bed locked, in lowest position, side rails up 2/4, call light within reach.        Recliner Assessment  Patient is able to demonstrate the ability to move from a reclining position to an upright position within the recliner.       4 Eyes Skin Assessment     NAME:  Enoc Mancia  YOB: 1955  MEDICAL RECORD NUMBER:  [id number]    The patient is being assessed for  Admission    I agree that at least one RN has performed a thorough Head to Toe Skin Assessment on the patient. ALL assessment sites listed below have been assessed.      Areas assessed by both nurses:    Head, Face, Ears, Shoulders, Back, Chest, Arms, Elbows, Hands, Sacrum. Buttock, Coccyx, Ischium, Legs. Feet and Heels, and Under Medical Devices         Does the Patient have a Wound? No noted wound(s)       Evan Prevention initiated by RN: No  Wound Care Orders initiated by RN: No    Pressure Injury (Stage 3,4, Unstageable, DTI, NWPT, and Complex wounds) if present, place Wound referral order by RN under : No    New Ostomies, if present place, Ostomy referral order under : No     Nurse 1 eSignature: Electronically signed by Kianna Nunez RN on 3/17/25 at 5:05 AM EDT    **SHARE this note so that the co-signing nurse can place an eSignature**    Nurse 2 eSignature: Electronically signed by Joan Galeano RN on 3/17/25 at 6:29 AM EDT   
Patient educated on discharge instructions as well as new medications use, dosage, administration and possible side effects.  Patient verified knowledge. IV removed without difficulty and dry dressing in place. Telemetry monitor removed and returned to CMU. Pt left facility in stable condition to Home with all of their personal belongings.     
Pt arrived for image guided Paracentesis. Dr. Israel explained the procedure including the risk and benefits of the procedure. All questions answered. Pt verbalizes understanding of the procedure and states no more questions. Consent confirmed. Vital signs stable. Labs, allergies, medications, and code status reviewed. No contraindications noted. Time out completed prior to procedure.    Vital Signs  Vitals:    03/17/25 0733   BP: 123/83   Pulse: 90   Resp: 18   Temp: 97.9 °F (36.6 °C)   SpO2: 92%    (vital signs in table format)      Allergies  Codeine and Pcn [penicillins] (allergies)    Labs  Lab Results   Component Value Date    INR 1.14 03/16/2025    PROTIME 14.8 03/16/2025     Lab Results   Component Value Date    CREATININE 1.6 (H) 03/17/2025    BUN 53 (H) 03/17/2025     (L) 03/17/2025    K 5.1 03/17/2025     03/17/2025    CO2 17 (L) 03/17/2025     Lab Results   Component Value Date    WBC 7.1 03/17/2025    HGB 14.7 03/17/2025    HCT 43.1 03/17/2025    .8 (H) 03/17/2025    PLT 98 (L) 03/17/2025      
Pt arrived to PACU from ENDO in stable condition. Report received from Saranya FOUNTAIN and Almita CHOE. Phase II. Care of pt transferred at this time. Pt  placed on cont pulse ox and BP. Pt arrived to unit with oxygen requirements. SPO2 93% on 4L via NC.   
Pt in endo for EGD. Pt is a/o, vss. CMU aware.   
Report called to Erica FOUNTAIN.   
Report called to mimi dumont regarding egd.   
Transferred care to АЛЕКСАНДР Maldonado. Face to face bedside report given, no need voiced at this time. Pt awake in bed.   No signs of distress noted.  Call light within reach.   Denies needs.    
baseline levels established preoperatively.  19.  The patient/caregiver demonstrates knowledge of the expected responses to the operative or invasive procedure.  20.  Patient/Caregiver has reduced anxiety.  Interventions- Familiarize with environment and equipment.  21. Other:  22. Other:

## 2025-03-18 NOTE — ANESTHESIA POSTPROCEDURE EVALUATION
Department of Anesthesiology  Postprocedure Note    Patient: Enoc Mancia  MRN: [id number]  YOB: 1955  Date of evaluation: 3/18/2025    Procedure Summary       Date: 03/18/25 Room / Location: [address]    Anesthesia Start: 1049 Anesthesia Stop:     Procedure: ESOPHAGOGASTRODUODENOSCOPY BIOPSY Diagnosis:       Melena      (Melena [K92.1])    Surgeons: Blaze Rondon DO Responsible Provider: Kenneth Whiteside MD    Anesthesia Type: MAC ASA Status: 4            Anesthesia Type: No value filed.    Peter Phase I: Peter Score: 10    Peter Phase II: Peter Score: 7    Anesthesia Post Evaluation    Patient location during evaluation: PACU  Patient participation: complete - patient participated  Level of consciousness: awake and alert  Airway patency: patent  Nausea & Vomiting: no nausea and no vomiting  Cardiovascular status: blood pressure returned to baseline  Respiratory status: acceptable  Hydration status: euvolemic  Comments: VSS on transfer to phase 2 recovery.  No anesthetic complications.  Pain management: adequate    No notable events documented.

## 2025-03-18 NOTE — FLOWSHEET NOTE
03/18/25 0739   Vital Signs   Temp 98.2 °F (36.8 °C)   Temp Source Oral   Pulse 83   Heart Rate Source Monitor   Respirations 16   /75   MAP (Calculated) 91   BP Location Left upper arm   BP Method Automatic   Patient Position Semi fowlers   Oxygen Therapy   SpO2 93 %   O2 Device None (Room air)     Patient resting quietly in bed. No s/s of distress noted. NPO for EGD this AM. Shift assessment complete, see flow sheet. Denies needs at this time. Call light in reach. Will monitor.

## 2025-03-18 NOTE — PLAN OF CARE
Problem: Discharge Planning  Goal: Discharge to home or other facility with appropriate resources  Outcome: Progressing  Flowsheets (Taken 3/17/2025 2019)  Discharge to home or other facility with appropriate resources: Identify barriers to discharge with patient and caregiver     Problem: Safety - Adult  Goal: Free from fall injury  Outcome: Progressing     Problem: Gastrointestinal - Adult  Goal: Minimal or absence of nausea and vomiting  Reactivated  Goal: Maintains or returns to baseline bowel function  Flowsheets (Taken 3/18/2025 0041)  Maintains or returns to baseline bowel function:   Assess bowel function   Administer ordered medications as needed  Goal: Maintains adequate nutritional intake  Flowsheets (Taken 3/18/2025 0041)  Maintains adequate nutritional intake:   Monitor percentage of each meal consumed   Assist with meals as needed   Monitor intake and output, weight and lab values     Problem: Metabolic/Fluid and Electrolytes - Adult  Goal: Electrolytes maintained within normal limits  Flowsheets (Taken 3/18/2025 0041)  Electrolytes maintained within normal limits: Monitor labs and assess patient for signs and symptoms of electrolyte imbalances  Goal: Hemodynamic stability and optimal renal function maintained  Flowsheets (Taken 3/18/2025 0041)  Hemodynamic stability and optimal renal function maintained:   Monitor labs and assess for signs and symptoms of volume excess or deficit   Monitor intake, output and patient weight

## 2025-03-19 ENCOUNTER — CARE COORDINATION (OUTPATIENT)
Dept: CASE MANAGEMENT | Age: 70
End: 2025-03-19

## 2025-03-19 ENCOUNTER — TELEPHONE (OUTPATIENT)
Dept: INTERVENTIONAL RADIOLOGY/VASCULAR | Age: 70
End: 2025-03-19

## 2025-03-19 ENCOUNTER — TELEPHONE (OUTPATIENT)
Dept: FAMILY MEDICINE CLINIC | Age: 70
End: 2025-03-19

## 2025-03-19 DIAGNOSIS — N17.9 AKI (ACUTE KIDNEY INJURY): Primary | ICD-10-CM

## 2025-03-19 LAB — HAV AB SER QL IA: POSITIVE

## 2025-03-19 PROCEDURE — 1111F DSCHRG MED/CURRENT MED MERGE: CPT | Performed by: NURSE PRACTITIONER

## 2025-03-19 RX ORDER — DICYCLOMINE HCL 20 MG
20 TABLET ORAL 4 TIMES DAILY
Qty: 120 TABLET | Refills: 1 | Status: ON HOLD | OUTPATIENT
Start: 2025-03-19 | End: 2025-05-18

## 2025-03-19 NOTE — TELEPHONE ENCOUNTER
Pts spouse was wanting to know if pcp would fill pts Dicyclomine 20 QID. Order pended. Please Advise.

## 2025-03-19 NOTE — TELEPHONE ENCOUNTER
Spoke to pts spouse and she stated that they will wait to schedule hospital f/u to see how pts GI appointment goes next week.

## 2025-03-19 NOTE — CARE COORDINATION
Patient has hospital follow up appointment scheduled  message routed to PCP office    Future Appointments         Provider Specialty Dept Phone    5/22/2025 9:00 AM Sofia Whitt, SATISH - CNP [facility] [phone]    7/22/2025 11:00 AM Sofia Whitt, SATISH - CNP [facility] [phone]            Care Transition Nurse provided contact information.  Plan for follow-up call in 2-5 days based on severity of symptoms and risk factors.  Plan for next call: self management-did HFU get scheduled      Ely Fuchs RN

## 2025-03-19 NOTE — TELEPHONE ENCOUNTER
Received a call from the pt's wife / Dara requesting to schedule a paracentesis for her  Enoc. Per wife she spoke with Dr. Rondon's office yesterday and they were suppose to place a standing order for weekly paracentesis at that time. No order noted in epic or on fax machine. Call placed to Dr. Rondon's office / [facility] and got the answering service to leave a message; left message to left staff / MD know pt is trying to schedule the paracentesis and we cannot see an order.

## 2025-03-20 ENCOUNTER — APPOINTMENT (OUTPATIENT)
Dept: CT IMAGING | Age: 70
DRG: 433 | End: 2025-03-20
Payer: MEDICARE

## 2025-03-20 ENCOUNTER — HOSPITAL ENCOUNTER (INPATIENT)
Age: 70
LOS: 5 days | Discharge: NURSING, REHAB OR LONG-TERM CARE | DRG: 433 | End: 2025-03-25
Attending: EMERGENCY MEDICINE | Admitting: INTERNAL MEDICINE
Payer: MEDICARE

## 2025-03-20 ENCOUNTER — TELEPHONE (OUTPATIENT)
Dept: FAMILY MEDICINE CLINIC | Age: 70
End: 2025-03-20

## 2025-03-20 ENCOUNTER — CARE COORDINATION (OUTPATIENT)
Dept: CASE MANAGEMENT | Age: 70
End: 2025-03-20

## 2025-03-20 DIAGNOSIS — K92.2 GASTROINTESTINAL HEMORRHAGE, UNSPECIFIED GASTROINTESTINAL HEMORRHAGE TYPE: Primary | ICD-10-CM

## 2025-03-20 DIAGNOSIS — K70.31 ALCOHOLIC CIRRHOSIS OF LIVER WITH ASCITES (HCC): ICD-10-CM

## 2025-03-20 DIAGNOSIS — K70.9 CHRONIC LIVER DISEASE DUE TO ALCOHOL: Primary | ICD-10-CM

## 2025-03-20 LAB
ABO + RH BLD: NORMAL
ALBUMIN SERPL-MCNC: 2.8 G/DL (ref 3.4–5)
ALBUMIN/GLOB SERPL: 0.8 {RATIO} (ref 1.1–2.2)
ALP SERPL-CCNC: 167 U/L (ref 40–129)
ALT SERPL-CCNC: 102 U/L (ref 10–40)
AMMONIA PLAS-SCNC: 92 UMOL/L (ref 16–60)
ANION GAP SERPL CALCULATED.3IONS-SCNC: 8 MMOL/L (ref 3–16)
ANTIBODY SCREEN: NORMAL
AST SERPL-CCNC: 167 U/L (ref 15–37)
BACTERIA FLD AEROBE CULT: NORMAL
BASOPHILS # BLD: 0 K/UL (ref 0–0.2)
BASOPHILS NFR BLD: 0.3 %
BILIRUB SERPL-MCNC: 2.2 MG/DL (ref 0–1)
BUN SERPL-MCNC: 35 MG/DL (ref 7–20)
CALCIUM SERPL-MCNC: 8.3 MG/DL (ref 8.3–10.6)
CHLORIDE SERPL-SCNC: 101 MMOL/L (ref 99–110)
CO2 SERPL-SCNC: 19 MMOL/L (ref 21–32)
CREAT SERPL-MCNC: 1.2 MG/DL (ref 0.8–1.3)
DEPRECATED RDW RBC AUTO: 15 % (ref 12.4–15.4)
EOSINOPHIL # BLD: 0 K/UL (ref 0–0.6)
EOSINOPHIL NFR BLD: 0.1 %
FLUAV RNA RESP QL NAA+PROBE: NOT DETECTED
FLUBV RNA RESP QL NAA+PROBE: NOT DETECTED
GFR SERPLBLD CREATININE-BSD FMLA CKD-EPI: 66 ML/MIN/{1.73_M2}
GLUCOSE BLD-MCNC: 87 MG/DL (ref 70–99)
GLUCOSE SERPL-MCNC: 105 MG/DL (ref 70–99)
GRAM STN SPEC: NORMAL
HCT VFR BLD AUTO: 43.8 % (ref 40.5–52.5)
HCT VFR BLD AUTO: 44.3 % (ref 40.5–52.5)
HEMOCCULT STL QL: ABNORMAL
HGB BLD-MCNC: 14.8 G/DL (ref 13.5–17.5)
HGB BLD-MCNC: 15 G/DL (ref 13.5–17.5)
LIPASE SERPL-CCNC: 36 U/L (ref 13–60)
LYMPHOCYTES # BLD: 0.2 K/UL (ref 1–5.1)
LYMPHOCYTES NFR BLD: 3.5 %
MCH RBC QN AUTO: 35.1 PG (ref 26–34)
MCHC RBC AUTO-ENTMCNC: 33.8 G/DL (ref 31–36)
MCV RBC AUTO: 103.8 FL (ref 80–100)
MONOCYTES # BLD: 0.5 K/UL (ref 0–1.3)
MONOCYTES NFR BLD: 6.4 %
NEUTROPHILS # BLD: 6.4 K/UL (ref 1.7–7.7)
NEUTROPHILS NFR BLD: 89.7 %
PERFORMED ON: NORMAL
PLATELET # BLD AUTO: 86 K/UL (ref 135–450)
PMV BLD AUTO: 9.4 FL (ref 5–10.5)
POTASSIUM SERPL-SCNC: 4 MMOL/L (ref 3.5–5.1)
PROT SERPL-MCNC: 6.5 G/DL (ref 6.4–8.2)
RBC # BLD AUTO: 4.27 M/UL (ref 4.2–5.9)
REASON FOR REJECTION: NORMAL
REASON FOR REJECTION: NORMAL
REJECTED TEST: NORMAL
REJECTED TEST: NORMAL
SARS-COV-2 RNA RESP QL NAA+PROBE: NOT DETECTED
SODIUM SERPL-SCNC: 128 MMOL/L (ref 136–145)
WBC # BLD AUTO: 7.2 K/UL (ref 4–11)

## 2025-03-20 PROCEDURE — 86900 BLOOD TYPING SEROLOGIC ABO: CPT

## 2025-03-20 PROCEDURE — 6370000000 HC RX 637 (ALT 250 FOR IP)

## 2025-03-20 PROCEDURE — 85018 HEMOGLOBIN: CPT

## 2025-03-20 PROCEDURE — 6360000002 HC RX W HCPCS

## 2025-03-20 PROCEDURE — 6360000002 HC RX W HCPCS: Performed by: EMERGENCY MEDICINE

## 2025-03-20 PROCEDURE — 6360000004 HC RX CONTRAST MEDICATION: Performed by: EMERGENCY MEDICINE

## 2025-03-20 PROCEDURE — 84450 TRANSFERASE (AST) (SGOT): CPT

## 2025-03-20 PROCEDURE — 99223 1ST HOSP IP/OBS HIGH 75: CPT

## 2025-03-20 PROCEDURE — 84132 ASSAY OF SERUM POTASSIUM: CPT

## 2025-03-20 PROCEDURE — 85025 COMPLETE CBC W/AUTO DIFF WBC: CPT

## 2025-03-20 PROCEDURE — 86850 RBC ANTIBODY SCREEN: CPT

## 2025-03-20 PROCEDURE — 2500000003 HC RX 250 WO HCPCS

## 2025-03-20 PROCEDURE — 87636 SARSCOV2 & INF A&B AMP PRB: CPT

## 2025-03-20 PROCEDURE — 82140 ASSAY OF AMMONIA: CPT

## 2025-03-20 PROCEDURE — 96374 THER/PROPH/DIAG INJ IV PUSH: CPT

## 2025-03-20 PROCEDURE — 36415 COLL VENOUS BLD VENIPUNCTURE: CPT

## 2025-03-20 PROCEDURE — 70450 CT HEAD/BRAIN W/O DYE: CPT

## 2025-03-20 PROCEDURE — 83690 ASSAY OF LIPASE: CPT

## 2025-03-20 PROCEDURE — 82270 OCCULT BLOOD FECES: CPT

## 2025-03-20 PROCEDURE — 86901 BLOOD TYPING SEROLOGIC RH(D): CPT

## 2025-03-20 PROCEDURE — 85014 HEMATOCRIT: CPT

## 2025-03-20 PROCEDURE — 80053 COMPREHEN METABOLIC PANEL: CPT

## 2025-03-20 PROCEDURE — 1200000000 HC SEMI PRIVATE

## 2025-03-20 PROCEDURE — 99285 EMERGENCY DEPT VISIT HI MDM: CPT

## 2025-03-20 PROCEDURE — 74174 CTA ABD&PLVS W/CONTRAST: CPT

## 2025-03-20 RX ORDER — SODIUM CHLORIDE 9 MG/ML
INJECTION, SOLUTION INTRAVENOUS PRN
Status: DISCONTINUED | OUTPATIENT
Start: 2025-03-20 | End: 2025-03-25 | Stop reason: HOSPADM

## 2025-03-20 RX ORDER — ASPIRIN 81 MG/1
81 TABLET, CHEWABLE ORAL DAILY
Status: DISCONTINUED | OUTPATIENT
Start: 2025-03-21 | End: 2025-03-25 | Stop reason: HOSPADM

## 2025-03-20 RX ORDER — MULTIVITAMIN WITH IRON
500 TABLET ORAL DAILY
Status: DISCONTINUED | OUTPATIENT
Start: 2025-03-20 | End: 2025-03-25 | Stop reason: HOSPADM

## 2025-03-20 RX ORDER — TAMSULOSIN HYDROCHLORIDE 0.4 MG/1
0.4 CAPSULE ORAL DAILY
Status: DISCONTINUED | OUTPATIENT
Start: 2025-03-20 | End: 2025-03-25 | Stop reason: HOSPADM

## 2025-03-20 RX ORDER — FINASTERIDE 5 MG/1
5 TABLET, FILM COATED ORAL DAILY
Status: DISCONTINUED | OUTPATIENT
Start: 2025-03-21 | End: 2025-03-25 | Stop reason: HOSPADM

## 2025-03-20 RX ORDER — SPIRONOLACTONE 25 MG/1
50 TABLET ORAL DAILY
Status: DISCONTINUED | OUTPATIENT
Start: 2025-03-21 | End: 2025-03-25 | Stop reason: HOSPADM

## 2025-03-20 RX ORDER — METOPROLOL SUCCINATE 25 MG/1
25 TABLET, EXTENDED RELEASE ORAL NIGHTLY
Status: DISCONTINUED | OUTPATIENT
Start: 2025-03-20 | End: 2025-03-25 | Stop reason: HOSPADM

## 2025-03-20 RX ORDER — ACETAMINOPHEN 325 MG/1
650 TABLET ORAL EVERY 6 HOURS PRN
Status: DISCONTINUED | OUTPATIENT
Start: 2025-03-20 | End: 2025-03-25 | Stop reason: HOSPADM

## 2025-03-20 RX ORDER — SODIUM CHLORIDE 0.9 % (FLUSH) 0.9 %
5-40 SYRINGE (ML) INJECTION PRN
Status: DISCONTINUED | OUTPATIENT
Start: 2025-03-20 | End: 2025-03-25 | Stop reason: HOSPADM

## 2025-03-20 RX ORDER — ONDANSETRON 4 MG/1
4 TABLET, ORALLY DISINTEGRATING ORAL EVERY 8 HOURS PRN
Status: DISCONTINUED | OUTPATIENT
Start: 2025-03-20 | End: 2025-03-25 | Stop reason: HOSPADM

## 2025-03-20 RX ORDER — ACETAMINOPHEN 650 MG/1
650 SUPPOSITORY RECTAL EVERY 6 HOURS PRN
Status: DISCONTINUED | OUTPATIENT
Start: 2025-03-20 | End: 2025-03-25 | Stop reason: HOSPADM

## 2025-03-20 RX ORDER — DICYCLOMINE HCL 20 MG
20 TABLET ORAL 4 TIMES DAILY
Status: DISCONTINUED | OUTPATIENT
Start: 2025-03-20 | End: 2025-03-25 | Stop reason: HOSPADM

## 2025-03-20 RX ORDER — IOPAMIDOL 755 MG/ML
75 INJECTION, SOLUTION INTRAVASCULAR
Status: COMPLETED | OUTPATIENT
Start: 2025-03-20 | End: 2025-03-20

## 2025-03-20 RX ORDER — SODIUM CHLORIDE 0.9 % (FLUSH) 0.9 %
5-40 SYRINGE (ML) INJECTION EVERY 12 HOURS SCHEDULED
Status: DISCONTINUED | OUTPATIENT
Start: 2025-03-20 | End: 2025-03-25 | Stop reason: HOSPADM

## 2025-03-20 RX ORDER — LACTULOSE 10 G/15ML
30 SOLUTION ORAL ONCE
Status: COMPLETED | OUTPATIENT
Start: 2025-03-20 | End: 2025-03-20

## 2025-03-20 RX ORDER — PANTOPRAZOLE SODIUM 40 MG/10ML
80 INJECTION, POWDER, LYOPHILIZED, FOR SOLUTION INTRAVENOUS ONCE
Status: COMPLETED | OUTPATIENT
Start: 2025-03-20 | End: 2025-03-20

## 2025-03-20 RX ORDER — ONDANSETRON 2 MG/ML
4 INJECTION INTRAMUSCULAR; INTRAVENOUS EVERY 6 HOURS PRN
Status: DISCONTINUED | OUTPATIENT
Start: 2025-03-20 | End: 2025-03-25 | Stop reason: HOSPADM

## 2025-03-20 RX ADMIN — Medication 40 MG: at 23:49

## 2025-03-20 RX ADMIN — PANTOPRAZOLE SODIUM 80 MG: 40 INJECTION, POWDER, LYOPHILIZED, FOR SOLUTION INTRAVENOUS at 14:28

## 2025-03-20 RX ADMIN — LACTULOSE 30 G: 10 SOLUTION ORAL at 17:40

## 2025-03-20 RX ADMIN — SODIUM CHLORIDE, PRESERVATIVE FREE 10 ML: 5 INJECTION INTRAVENOUS at 22:00

## 2025-03-20 RX ADMIN — Medication 500 MCG: at 17:40

## 2025-03-20 RX ADMIN — TAMSULOSIN HYDROCHLORIDE 0.4 MG: 0.4 CAPSULE ORAL at 17:40

## 2025-03-20 RX ADMIN — DICYCLOMINE HYDROCHLORIDE 20 MG: 20 TABLET ORAL at 23:51

## 2025-03-20 RX ADMIN — METOPROLOL SUCCINATE 25 MG: 25 TABLET, EXTENDED RELEASE ORAL at 23:52

## 2025-03-20 RX ADMIN — IOPAMIDOL 75 ML: 755 INJECTION, SOLUTION INTRAVENOUS at 13:44

## 2025-03-20 ASSESSMENT — PAIN SCALES - GENERAL
PAINLEVEL_OUTOF10: 0
PAINLEVEL_OUTOF10: 3

## 2025-03-20 ASSESSMENT — PAIN - FUNCTIONAL ASSESSMENT: PAIN_FUNCTIONAL_ASSESSMENT: NONE - DENIES PAIN

## 2025-03-20 ASSESSMENT — PAIN DESCRIPTION - LOCATION: LOCATION: ABDOMEN

## 2025-03-20 ASSESSMENT — LIFESTYLE VARIABLES
HOW MANY STANDARD DRINKS CONTAINING ALCOHOL DO YOU HAVE ON A TYPICAL DAY: PATIENT DOES NOT DRINK
HOW OFTEN DO YOU HAVE A DRINK CONTAINING ALCOHOL: NEVER
HOW MANY STANDARD DRINKS CONTAINING ALCOHOL DO YOU HAVE ON A TYPICAL DAY: PATIENT DOES NOT DRINK
HOW OFTEN DO YOU HAVE A DRINK CONTAINING ALCOHOL: NEVER

## 2025-03-20 ASSESSMENT — PAIN DESCRIPTION - ORIENTATION: ORIENTATION: MID

## 2025-03-20 ASSESSMENT — PAIN DESCRIPTION - PAIN TYPE: TYPE: ACUTE PAIN

## 2025-03-20 ASSESSMENT — PAIN DESCRIPTION - DESCRIPTORS: DESCRIPTORS: ACHING

## 2025-03-20 NOTE — ED PROVIDER NOTES
CHOLECALCIFEROL (VITAMIN D HIGH POTENCY PO)    Take 500 mcg by mouth daily    DICYCLOMINE (BENTYL) 20 MG TABLET    Take 1 tablet by mouth 4 times daily    FINASTERIDE (PROSCAR) 5 MG TABLET    Take 1 tablet by mouth daily    FLUOXETINE (PROZAC) 40 MG CAPSULE    TAKE TWO CAPSULES BY MOUTH EVERY DAY    METOPROLOL SUCCINATE (TOPROL XL) 25 MG EXTENDED RELEASE TABLET    Take 1 tablet by mouth nightly    MULTIPLE VITAMIN (MULTIVITAMIN) TABS TABLET    Take 1 tablet by mouth daily    OMEPRAZOLE (PRILOSEC) 20 MG DELAYED RELEASE CAPSULE    TAKE 1 CAPSULE BY MOUTH DAILY    POLYCARBOPHIL (FIBERCON) 625 MG TABLET    Take 1 tablet by mouth daily    PROBIOTIC PRODUCT (CULTURELLE PROBIOTICS PO)    Take by mouth daily    SPIRONOLACTONE (ALDACTONE) 50 MG TABLET    Take 1 tablet by mouth daily    TAMSULOSIN (FLOMAX) 0.4 MG CAPSULE    TAKE ONE CAPSULE BY MOUTH EVERY DAY NIGHTLY    THIAMINE 100 MG TABLET    Take 1 tablet by mouth daily    VITAMIN B-12 (CYANOCOBALAMIN) 500 MCG TABLET    Take 1 tablet by mouth daily       ALLERGIES     Codeine and Pcn [penicillins]    FAMILYHISTORY       Family History   Problem Relation Age of Onset    Cancer Mother         lung    Stroke Mother         at age 21    Diabetes Mother     Heart Disease Father     High Blood Pressure Father         SOCIAL HISTORY       Social History     Tobacco Use    Smoking status: Former     Current packs/day: 0.00     Average packs/day: 2.0 packs/day for 15.0 years (30.0 ttl pk-yrs)     Types: Cigarettes     Start date: 1978     Quit date: 1993     Years since quittin.6    Smokeless tobacco: Current     Types: Snuff    Tobacco comments:     chews daily   Vaping Use    Vaping status: Never Used   Substance Use Topics    Alcohol use: Not Currently     Alcohol/week: 30.0 - 35.0 standard drinks of alcohol     Types: 30 - 35 Cans of beer per week     Comment: PER WIFE    Drug use: Yes     Types: Marijuana (Weed)     Comment: daily       SCREENINGS

## 2025-03-20 NOTE — CARE COORDINATION
Monitoring:  Offered patient enrollment in the Remote Patient Monitoring (RPM) program for in-home monitoring: Deferred at this time because unable to discuss; will discuss at next outreach.    Assessments:  Care Transitions Subsequent and Final Call    Subsequent and Final Calls  Care Transitions Interventions  Other Interventions:              Follow Up Appointment:   Recommend ER visit  Future Appointments         Provider Specialty Dept Phone    5/22/2025 9:00 AM Sofia Whitt, SATISH - CNP [facility] [phone]    7/22/2025 11:00 AM Sofia Whitt, SATISH - CNP [facility] [phone]            Care Transition Nurse provided contact information.  Plan for follow-up on next business day.  based on severity of symptoms and risk factors.  Plan for next call: symptom management-see note      Ely Fuhcs RN

## 2025-03-20 NOTE — H&P
[facility] Medicine History & Physical      PCP: Sofia Whitt, APRN - CNP    Date of Admission: 3/20/2025    Date of Service: Pt seen/examined on 03/20/25      Chief Complaint:    Chief Complaint   Patient presents with    Altered Mental Status     Confused since yesterday afternoon.  Having black stool.  Recent admission this week.  Had egd done that family states was normal.         History Of Present Illness:      The patient is a 69 y.o. male  with PMH GERD, cirrhosis, depression, enlarged prostate, HLD, HTN, RA, seizures who presents to [facility] with black stool.  Wife at bedside states that he has been intermittently confused since yesterday afternoon.  He is currently alert and oriented x 3 on exam.  Patient reports that he has been having abdominal pain, nausea, decreased appetite, and diarrhea.  Patient reports this was dark brown, however, wife reports that it was black.  He denies any vomiting, shortness of breath, chest pain. Had recent EGD 3/18 that showed grade I esophageal varices and portal hypertensive gastropathy.     History obtained from the patient and review of EMR.     Past Medical History:        Diagnosis Date    Acid reflux     Arthritis     rheumatoid    Cirrhosis (HCC)     Depression     Enlarged prostate     Hearing decreased     does not wear his hearing aid    Hyperlipidemia     no meds    Hypertension     Liver disease     Stage 2 per pt     Muscle pain     Rheumatoid arthritis (HCC)     Seizure (HCC) 01/22/2025       Past Surgical History:        Procedure Laterality Date    APPENDECTOMY      CARDIAC CATHETERIZATION      COLONOSCOPY  7/25/2013    polyps    COLONOSCOPY  01/18/2021    COLONOSCOPY N/A 1/18/2021    COLON W/ANES. (9:00) **FIBROSCAN TOO** performed by Valeriy Talley MD at [facility] ENDOSCOPY    CYSTOSCOPY      OTHER SURGICAL HISTORY  09/27/2012    muscle biopsy left shoulder    UPPER GASTROINTESTINAL ENDOSCOPY  7/25/2013    dysphagia    UPPER

## 2025-03-20 NOTE — ED NOTES
647 admit@1320   Daily Note     Today's date: 2024  Patient name: Jossy Marquez  : 1950  MRN: [id number]  Referring provider: Khanh Louis [id number]  URL: [email or web address]  Date: 2024  Prepared by: José Us    

## 2025-03-20 NOTE — TELEPHONE ENCOUNTER
Spouse of patient called and wanted to let the office know she is taking Enoc to the ER to be evaluated. She didn't state what for as she had to get off the phone abruptly because the hospital was calling.

## 2025-03-21 ENCOUNTER — CARE COORDINATION (OUTPATIENT)
Dept: CASE MANAGEMENT | Age: 70
End: 2025-03-21

## 2025-03-21 PROBLEM — K76.82 HEPATIC ENCEPHALOPATHY (HCC): Status: ACTIVE | Noted: 2025-03-21

## 2025-03-21 LAB
ALBUMIN SERPL-MCNC: 2.5 G/DL (ref 3.4–5)
ALBUMIN/GLOB SERPL: 0.7 {RATIO} (ref 1.1–2.2)
ALP SERPL-CCNC: 145 U/L (ref 40–129)
ALT SERPL-CCNC: 90 U/L (ref 10–40)
AMMONIA PLAS-SCNC: 86 UMOL/L (ref 16–60)
ANION GAP SERPL CALCULATED.3IONS-SCNC: 9 MMOL/L (ref 3–16)
AST SERPL-CCNC: 179 U/L (ref 15–37)
BACTERIA URNS QL MICRO: ABNORMAL /HPF
BASOPHILS # BLD: 0 K/UL (ref 0–0.2)
BASOPHILS NFR BLD: 0.2 %
BILIRUB SERPL-MCNC: 2 MG/DL (ref 0–1)
BILIRUB UR QL STRIP.AUTO: ABNORMAL
BUN SERPL-MCNC: 37 MG/DL (ref 7–20)
CALCIUM SERPL-MCNC: 8.2 MG/DL (ref 8.3–10.6)
CHLORIDE SERPL-SCNC: 104 MMOL/L (ref 99–110)
CLARITY UR: CLEAR
CO2 SERPL-SCNC: 17 MMOL/L (ref 21–32)
COLOR UR: ABNORMAL
CREAT SERPL-MCNC: 1.2 MG/DL (ref 0.8–1.3)
DEPRECATED RDW RBC AUTO: 14.7 % (ref 12.4–15.4)
EOSINOPHIL # BLD: 0 K/UL (ref 0–0.6)
EOSINOPHIL NFR BLD: 0.5 %
EPI CELLS #/AREA URNS HPF: ABNORMAL /HPF (ref 0–5)
GFR SERPLBLD CREATININE-BSD FMLA CKD-EPI: 66 ML/MIN/{1.73_M2}
GLUCOSE BLD-MCNC: 80 MG/DL (ref 70–99)
GLUCOSE SERPL-MCNC: 80 MG/DL (ref 70–99)
GLUCOSE UR STRIP.AUTO-MCNC: NEGATIVE MG/DL
HCT VFR BLD AUTO: 42.3 % (ref 40.5–52.5)
HCT VFR BLD AUTO: 42.4 % (ref 40.5–52.5)
HCT VFR BLD AUTO: 43 % (ref 40.5–52.5)
HCT VFR BLD AUTO: 50.2 % (ref 40.5–52.5)
HGB BLD-MCNC: 14.3 G/DL (ref 13.5–17.5)
HGB BLD-MCNC: 14.3 G/DL (ref 13.5–17.5)
HGB BLD-MCNC: 14.6 G/DL (ref 13.5–17.5)
HGB BLD-MCNC: 17.3 G/DL (ref 13.5–17.5)
HGB UR QL STRIP.AUTO: NEGATIVE
HYALINE CASTS #/AREA URNS LPF: ABNORMAL /LPF (ref 0–2)
IRON SATN MFR SERPL: 38 % (ref 20–50)
IRON SERPL-MCNC: 49 UG/DL (ref 59–158)
KETONES UR STRIP.AUTO-MCNC: NEGATIVE MG/DL
LEUKOCYTE ESTERASE UR QL STRIP.AUTO: NEGATIVE
LYMPHOCYTES # BLD: 0.3 K/UL (ref 1–5.1)
LYMPHOCYTES NFR BLD: 5 %
MCH RBC QN AUTO: 35.4 PG (ref 26–34)
MCHC RBC AUTO-ENTMCNC: 34.4 G/DL (ref 31–36)
MCV RBC AUTO: 103 FL (ref 80–100)
MONOCYTES # BLD: 0.4 K/UL (ref 0–1.3)
MONOCYTES NFR BLD: 7.5 %
MUCOUS THREADS #/AREA URNS LPF: ABNORMAL /LPF
NEUTROPHILS # BLD: 4.9 K/UL (ref 1.7–7.7)
NEUTROPHILS NFR BLD: 86.8 %
NITRITE UR QL STRIP.AUTO: NEGATIVE
PERFORMED ON: NORMAL
PH UR STRIP.AUTO: 6 [PH] (ref 5–8)
PLATELET # BLD AUTO: 59 K/UL (ref 135–450)
PMV BLD AUTO: 8.8 FL (ref 5–10.5)
POTASSIUM SERPL-SCNC: 3.9 MMOL/L (ref 3.5–5.1)
PROT SERPL-MCNC: 5.9 G/DL (ref 6.4–8.2)
PROT UR STRIP.AUTO-MCNC: ABNORMAL MG/DL
RBC # BLD AUTO: 4.88 M/UL (ref 4.2–5.9)
RBC #/AREA URNS HPF: ABNORMAL /HPF (ref 0–4)
RENAL EPI CELLS #/AREA UR COMP ASSIST: ABNORMAL /HPF (ref 0–1)
SODIUM SERPL-SCNC: 130 MMOL/L (ref 136–145)
SP GR UR STRIP.AUTO: 1.01 (ref 1–1.03)
TIBC SERPL-MCNC: 130 UG/DL (ref 260–445)
UA COMPLETE W REFLEX CULTURE PNL UR: ABNORMAL
UA DIPSTICK W REFLEX MICRO PNL UR: YES
URN SPEC COLLECT METH UR: ABNORMAL
UROBILINOGEN UR STRIP-ACNC: 1 E.U./DL
WBC # BLD AUTO: 5.6 K/UL (ref 4–11)
WBC #/AREA URNS HPF: ABNORMAL /HPF (ref 0–5)

## 2025-03-21 PROCEDURE — 83550 IRON BINDING TEST: CPT

## 2025-03-21 PROCEDURE — 2500000003 HC RX 250 WO HCPCS

## 2025-03-21 PROCEDURE — 6370000000 HC RX 637 (ALT 250 FOR IP)

## 2025-03-21 PROCEDURE — 36415 COLL VENOUS BLD VENIPUNCTURE: CPT

## 2025-03-21 PROCEDURE — 6370000000 HC RX 637 (ALT 250 FOR IP): Performed by: PHYSICIAN ASSISTANT

## 2025-03-21 PROCEDURE — 80053 COMPREHEN METABOLIC PANEL: CPT

## 2025-03-21 PROCEDURE — 6360000002 HC RX W HCPCS

## 2025-03-21 PROCEDURE — 83540 ASSAY OF IRON: CPT

## 2025-03-21 PROCEDURE — 1200000000 HC SEMI PRIVATE

## 2025-03-21 PROCEDURE — 2580000003 HC RX 258

## 2025-03-21 PROCEDURE — 81001 URINALYSIS AUTO W/SCOPE: CPT

## 2025-03-21 PROCEDURE — 99232 SBSQ HOSP IP/OBS MODERATE 35: CPT | Performed by: INTERNAL MEDICINE

## 2025-03-21 PROCEDURE — 85014 HEMATOCRIT: CPT

## 2025-03-21 PROCEDURE — 82140 ASSAY OF AMMONIA: CPT

## 2025-03-21 PROCEDURE — 85018 HEMOGLOBIN: CPT

## 2025-03-21 PROCEDURE — 85025 COMPLETE CBC W/AUTO DIFF WBC: CPT

## 2025-03-21 RX ORDER — LACTULOSE 10 G/15ML
20 SOLUTION ORAL 3 TIMES DAILY
Status: DISCONTINUED | OUTPATIENT
Start: 2025-03-21 | End: 2025-03-25 | Stop reason: HOSPADM

## 2025-03-21 RX ORDER — LACTULOSE 10 G/15ML
20 SOLUTION ORAL 3 TIMES DAILY
Status: CANCELLED | OUTPATIENT
Start: 2025-03-21

## 2025-03-21 RX ADMIN — FLUOXETINE HYDROCHLORIDE 80 MG: 20 CAPSULE ORAL at 08:33

## 2025-03-21 RX ADMIN — DICYCLOMINE HYDROCHLORIDE 20 MG: 20 TABLET ORAL at 17:14

## 2025-03-21 RX ADMIN — LACTULOSE 20 G: 10 SOLUTION ORAL at 13:38

## 2025-03-21 RX ADMIN — DICYCLOMINE HYDROCHLORIDE 20 MG: 20 TABLET ORAL at 13:37

## 2025-03-21 RX ADMIN — METOPROLOL SUCCINATE 25 MG: 25 TABLET, EXTENDED RELEASE ORAL at 22:19

## 2025-03-21 RX ADMIN — DICYCLOMINE HYDROCHLORIDE 20 MG: 20 TABLET ORAL at 22:19

## 2025-03-21 RX ADMIN — Medication 500 MCG: at 08:33

## 2025-03-21 RX ADMIN — FINASTERIDE 5 MG: 5 TABLET, FILM COATED ORAL at 08:33

## 2025-03-21 RX ADMIN — LACTULOSE 20 G: 10 SOLUTION ORAL at 22:20

## 2025-03-21 RX ADMIN — Medication 40 MG: at 22:19

## 2025-03-21 RX ADMIN — SODIUM CHLORIDE, PRESERVATIVE FREE 10 ML: 5 INJECTION INTRAVENOUS at 22:32

## 2025-03-21 RX ADMIN — SPIRONOLACTONE 50 MG: 25 TABLET ORAL at 08:33

## 2025-03-21 RX ADMIN — CALCIUM POLYCARBOPHIL 625 MG: 625 TABLET, FILM COATED ORAL at 08:38

## 2025-03-21 RX ADMIN — DICYCLOMINE HYDROCHLORIDE 20 MG: 20 TABLET ORAL at 08:33

## 2025-03-21 RX ADMIN — SODIUM CHLORIDE, PRESERVATIVE FREE 10 ML: 5 INJECTION INTRAVENOUS at 08:34

## 2025-03-21 RX ADMIN — Medication 40 MG: at 13:37

## 2025-03-21 RX ADMIN — TAMSULOSIN HYDROCHLORIDE 0.4 MG: 0.4 CAPSULE ORAL at 08:33

## 2025-03-21 NOTE — PLAN OF CARE
Problem: ABCDS Injury Assessment  Goal: Absence of physical injury  3/21/2025 1016 by Fran Gilmore RN  Outcome: Progressing  3/21/2025 0431 by Claudio Rhodes RN  Outcome: Progressing     Problem: Discharge Planning  Goal: Discharge to home or other facility with appropriate resources  3/21/2025 0431 by Claudio Rhodes RN  Outcome: Progressing  Flowsheets (Taken 3/20/2025 2100)  Discharge to home or other facility with appropriate resources: Identify barriers to discharge with patient and caregiver     Problem: Pain  Goal: Verbalizes/displays adequate comfort level or baseline comfort level  3/21/2025 1016 by Fran Gilmore RN  Outcome: Progressing  3/21/2025 0431 by Cluadio Rhodes RN  Outcome: Progressing     Problem: Safety - Adult  Goal: Free from fall injury  3/21/2025 1016 by Fran Gilmore RN  Outcome: Progressing  3/21/2025 0431 by Claudio Rhodes RN  Outcome: Progressing

## 2025-03-21 NOTE — TELEPHONE ENCOUNTER
Called and spoke to Family Dara wife  about upcoming procedure. Phone number used: [phone]  Procedure: Paracentesis       Pt informed of the following:  Date of procedure: 03/24/25, 03/31/2025, 04/07/2025  Arrival time of procedure: 1130  Time of procedure: 1200  Check in at Main Entrance prior to procedure.    Pre Procedure Checklist:   H & P completed within 30 days of scheduled procedure?Yes  If No  Call placed to PCP N/A    Wife reports pt is currently admitted to [facility] but is expected to be discharged on 03/22/2025.

## 2025-03-21 NOTE — ACP (ADVANCE CARE PLANNING)
Advance Care Planning     General Advance Care Planning (ACP) Conversation    Date of Conversation: 3/21/2025  Conducted with: Patient with Decision Making Capacity  Other persons present: Spouse Poonam    Healthcare Decision Maker:   Primary Decision Maker: Dara Mancia J - Spouse - [phone]       Content/Action Overview:  DECLINED ACP Conversation - will revisit periodically  Reviewed DNR/DNI and patient elects Full Code (Attempt Resuscitation)        Length of Voluntary ACP Conversation in minutes:  <16 minutes (Non-Billable)    Ariela Negron RN

## 2025-03-21 NOTE — CONSULTS
GASTROENTEROLOGY INPATIENT CONSULTATION        IDENTIFYING DATA/REASON FOR CONSULTATION   PATIENT:  Enoc Mancia  MRN:  [id number]  ADMIT DATE: 3/20/2025  TIME OF EVALUATION: 3/21/2025 4:11 PM  HOSPITAL STAY:   LOS: 1 day     REASON FOR CONSULTATION:  GI bleed    HISTORY OF PRESENT ILLNESS   Enoc Mancia is a 69 y.o. male with a PMH of ETOH related cirrhosis, rheumatoid arthritis on Xeljanz who presented on 3/20/2025 with black stools and confusion.       Patient reports that he has been having abdominal pain, nausea, decreased appetite, and diarrhea. Patient reports this was dark brown, however, wife reports that it was black. During his admission patient noted to have brown stools. Per wife no etoh in one month.  Hgb has remained WNL during this admission. Ammonia 86. BUN is elevated at 37. LFTs with total bili 2.2, Alk phos 167, , . CTA negative for active bleed.      Prior Endoscopic Evaluations:  EGD 3/18/2025 with Dr. Rondon:  Grade I esophageal varices. Portal hypertensive gastropathy. Flattened mucosa was found in the duodenum. Biopsied.      Colonoscopy 1/2021 with Dr. Talley:  -Small internal hemorrhoids. Otherwise normal colonoscopy to the cecum.  -Repeat colonoscopy 5 years due to prior history of colon polyps.     EGD and Colonoscopy 2013: colon polyps removed, hiatus hernia        PAST MEDICAL, SURGICAL, FAMILY, and SOCIAL HISTORY     Past Medical History:   Diagnosis Date    Acid reflux     Arthritis     rheumatoid    Cirrhosis (HCC)     Depression     Enlarged prostate     Hearing decreased     does not wear his hearing aid    Hyperlipidemia     no meds    Hypertension     Liver disease     Stage 2 per pt     Muscle pain     Rheumatoid arthritis (HCC)     Seizure (HCC) 01/22/2025     Past Surgical History:   Procedure Laterality Date    APPENDECTOMY      CARDIAC CATHETERIZATION      COLONOSCOPY  7/25/2013    polyps    COLONOSCOPY  01/18/2021    COLONOSCOPY N/A 1/18/2021

## 2025-03-22 LAB
ALBUMIN SERPL-MCNC: 2.4 G/DL (ref 3.4–5)
ALBUMIN/GLOB SERPL: 0.7 {RATIO} (ref 1.1–2.2)
ALP SERPL-CCNC: 144 U/L (ref 40–129)
ALT SERPL-CCNC: 90 U/L (ref 10–40)
ANION GAP SERPL CALCULATED.3IONS-SCNC: 9 MMOL/L (ref 3–16)
AST SERPL-CCNC: 182 U/L (ref 15–37)
BASOPHILS # BLD: 0 K/UL (ref 0–0.2)
BASOPHILS NFR BLD: 0.4 %
BILIRUB SERPL-MCNC: 2.3 MG/DL (ref 0–1)
BUN SERPL-MCNC: 40 MG/DL (ref 7–20)
C DIFF TOX A+B STL QL IA: NORMAL
CALCIUM SERPL-MCNC: 8.1 MG/DL (ref 8.3–10.6)
CHLORIDE SERPL-SCNC: 104 MMOL/L (ref 99–110)
CO2 SERPL-SCNC: 17 MMOL/L (ref 21–32)
CREAT SERPL-MCNC: 1.3 MG/DL (ref 0.8–1.3)
DEPRECATED RDW RBC AUTO: 14.6 % (ref 12.4–15.4)
EOSINOPHIL # BLD: 0 K/UL (ref 0–0.6)
EOSINOPHIL NFR BLD: 0.6 %
GFR SERPLBLD CREATININE-BSD FMLA CKD-EPI: 60 ML/MIN/{1.73_M2}
GLUCOSE SERPL-MCNC: 90 MG/DL (ref 70–99)
HCT VFR BLD AUTO: 41 % (ref 40.5–52.5)
HCT VFR BLD AUTO: 41.3 % (ref 40.5–52.5)
HCT VFR BLD AUTO: 41.9 % (ref 40.5–52.5)
HGB BLD-MCNC: 14 G/DL (ref 13.5–17.5)
HGB BLD-MCNC: 14.3 G/DL (ref 13.5–17.5)
HGB BLD-MCNC: 14.3 G/DL (ref 13.5–17.5)
LYMPHOCYTES # BLD: 0.3 K/UL (ref 1–5.1)
LYMPHOCYTES NFR BLD: 3.5 %
MCH RBC QN AUTO: 35.4 PG (ref 26–34)
MCHC RBC AUTO-ENTMCNC: 34.3 G/DL (ref 31–36)
MCV RBC AUTO: 103.4 FL (ref 80–100)
MONOCYTES # BLD: 0.5 K/UL (ref 0–1.3)
MONOCYTES NFR BLD: 7 %
NEUTROPHILS # BLD: 6.9 K/UL (ref 1.7–7.7)
NEUTROPHILS NFR BLD: 88.5 %
PLATELET # BLD AUTO: 73 K/UL (ref 135–450)
PMV BLD AUTO: 8.7 FL (ref 5–10.5)
POTASSIUM SERPL-SCNC: 3.9 MMOL/L (ref 3.5–5.1)
PROT SERPL-MCNC: 5.7 G/DL (ref 6.4–8.2)
RBC # BLD AUTO: 3.96 M/UL (ref 4.2–5.9)
SODIUM SERPL-SCNC: 130 MMOL/L (ref 136–145)
WBC # BLD AUTO: 7.8 K/UL (ref 4–11)

## 2025-03-22 PROCEDURE — 6370000000 HC RX 637 (ALT 250 FOR IP)

## 2025-03-22 PROCEDURE — 87324 CLOSTRIDIUM AG IA: CPT

## 2025-03-22 PROCEDURE — 2580000003 HC RX 258

## 2025-03-22 PROCEDURE — 87449 NOS EACH ORGANISM AG IA: CPT

## 2025-03-22 PROCEDURE — 1200000000 HC SEMI PRIVATE

## 2025-03-22 PROCEDURE — 85018 HEMOGLOBIN: CPT

## 2025-03-22 PROCEDURE — 36415 COLL VENOUS BLD VENIPUNCTURE: CPT

## 2025-03-22 PROCEDURE — 6370000000 HC RX 637 (ALT 250 FOR IP): Performed by: PHYSICIAN ASSISTANT

## 2025-03-22 PROCEDURE — 80053 COMPREHEN METABOLIC PANEL: CPT

## 2025-03-22 PROCEDURE — 2500000003 HC RX 250 WO HCPCS

## 2025-03-22 PROCEDURE — 6360000002 HC RX W HCPCS

## 2025-03-22 PROCEDURE — 99232 SBSQ HOSP IP/OBS MODERATE 35: CPT | Performed by: INTERNAL MEDICINE

## 2025-03-22 PROCEDURE — 85025 COMPLETE CBC W/AUTO DIFF WBC: CPT

## 2025-03-22 PROCEDURE — 85014 HEMATOCRIT: CPT

## 2025-03-22 RX ADMIN — Medication 40 MG: at 12:50

## 2025-03-22 RX ADMIN — DICYCLOMINE HYDROCHLORIDE 20 MG: 20 TABLET ORAL at 21:24

## 2025-03-22 RX ADMIN — LACTULOSE 20 G: 10 SOLUTION ORAL at 14:26

## 2025-03-22 RX ADMIN — SODIUM CHLORIDE, PRESERVATIVE FREE 10 ML: 5 INJECTION INTRAVENOUS at 23:26

## 2025-03-22 RX ADMIN — SODIUM CHLORIDE, PRESERVATIVE FREE 10 ML: 5 INJECTION INTRAVENOUS at 08:32

## 2025-03-22 RX ADMIN — TAMSULOSIN HYDROCHLORIDE 0.4 MG: 0.4 CAPSULE ORAL at 08:32

## 2025-03-22 RX ADMIN — DICYCLOMINE HYDROCHLORIDE 20 MG: 20 TABLET ORAL at 12:50

## 2025-03-22 RX ADMIN — METOPROLOL SUCCINATE 25 MG: 25 TABLET, EXTENDED RELEASE ORAL at 21:24

## 2025-03-22 RX ADMIN — FINASTERIDE 5 MG: 5 TABLET, FILM COATED ORAL at 08:32

## 2025-03-22 RX ADMIN — Medication 40 MG: at 23:26

## 2025-03-22 RX ADMIN — CALCIUM POLYCARBOPHIL 625 MG: 625 TABLET, FILM COATED ORAL at 08:32

## 2025-03-22 RX ADMIN — LACTULOSE 20 G: 10 SOLUTION ORAL at 21:26

## 2025-03-22 RX ADMIN — SPIRONOLACTONE 50 MG: 25 TABLET ORAL at 08:32

## 2025-03-22 RX ADMIN — DICYCLOMINE HYDROCHLORIDE 20 MG: 20 TABLET ORAL at 08:32

## 2025-03-22 RX ADMIN — FLUOXETINE HYDROCHLORIDE 80 MG: 20 CAPSULE ORAL at 08:32

## 2025-03-22 RX ADMIN — Medication 500 MCG: at 08:32

## 2025-03-22 RX ADMIN — DICYCLOMINE HYDROCHLORIDE 20 MG: 20 TABLET ORAL at 17:45

## 2025-03-22 RX ADMIN — LACTULOSE 20 G: 10 SOLUTION ORAL at 08:31

## 2025-03-22 NOTE — PLAN OF CARE
Problem: ABCDS Injury Assessment  Goal: Absence of physical injury  3/22/2025 1137 by Fran Gilmore, RN  Outcome: Progressing  3/22/2025 0450 by Clemencia Nova RN  Outcome: Progressing     Problem: Discharge Planning  Goal: Discharge to home or other facility with appropriate resources  3/22/2025 1137 by Fran Gilmore, RN  Outcome: Progressing  3/22/2025 0450 by Clemencia Nova RN  Outcome: Progressing  Flowsheets (Taken 3/21/2025 2048)  Discharge to home or other facility with appropriate resources: Identify barriers to discharge with patient and caregiver     Problem: Pain  Goal: Verbalizes/displays adequate comfort level or baseline comfort level  3/22/2025 0450 by Clemencia Nova RN  Outcome: Progressing     Problem: Safety - Adult  Goal: Free from fall injury  3/22/2025 1137 by Fran Gilmore, RN  Outcome: Progressing  3/22/2025 0450 by Clemencia Nova RN  Outcome: Progressing

## 2025-03-22 NOTE — FLOWSHEET NOTE
.   03/21/25 2044   Vital Signs   Temp 98.6 °F (37 °C)   Temp Source Oral   Pulse 78   Heart Rate Source Monitor   Respirations 17   BP (!) 115/95   MAP (Calculated) 102   BP Location Right upper arm   BP Method Automatic   Patient Position Semi fowlers   Pain Assessment   Pain Assessment None - Denies Pain   Opioid-Induced Sedation   POSS Score 1   RASS   Martini Agitation Sedation Scale (RASS) 0   Oxygen Therapy   SpO2 95 %   O2 Device None (Room air)     Shift assessment is complete. The pt is A&O x 4, The pt denies any further needs at this time. The pt call light and personal items are with in reach. The bed alarm is on. Will continue to monitor.

## 2025-03-22 NOTE — PLAN OF CARE
Problem: ABCDS Injury Assessment  Goal: Absence of physical injury  Outcome: Progressing     Problem: Discharge Planning  Goal: Discharge to home or other facility with appropriate resources  Outcome: Progressing  Flowsheets (Taken 3/21/2025 2048)  Discharge to home or other facility with appropriate resources: Identify barriers to discharge with patient and caregiver     Problem: Pain  Goal: Verbalizes/displays adequate comfort level or baseline comfort level  Outcome: Progressing     Problem: Safety - Adult  Goal: Free from fall injury  Outcome: Progressing

## 2025-03-22 NOTE — FLOWSHEET NOTE
03/22/25 0726   Handoff   Communication Given Shift Handoff   Handoff Given To Fran FOUNTAIN   Handoff Received From Johanna FOUNTAIN/ Clemencia RN   Handoff Communication Face to Face;At bedside   Time Handoff Given 0708     Patient is stable. All end of shift needs have been met. No further assistance needed at this time.

## 2025-03-23 PROBLEM — I95.1 ORTHOSTATIC HYPOTENSION: Status: ACTIVE | Noted: 2025-03-23

## 2025-03-23 LAB
ALBUMIN SERPL-MCNC: 2.3 G/DL (ref 3.4–5)
ALBUMIN/GLOB SERPL: 0.6 {RATIO} (ref 1.1–2.2)
ALP SERPL-CCNC: 149 U/L (ref 40–129)
ALT SERPL-CCNC: 94 U/L (ref 10–40)
ANION GAP SERPL CALCULATED.3IONS-SCNC: 9 MMOL/L (ref 3–16)
AST SERPL-CCNC: 193 U/L (ref 15–37)
BASOPHILS # BLD: 0 K/UL (ref 0–0.2)
BASOPHILS NFR BLD: 0.2 %
BILIRUB SERPL-MCNC: 2.6 MG/DL (ref 0–1)
BUN SERPL-MCNC: 41 MG/DL (ref 7–20)
CALCIUM SERPL-MCNC: 8.7 MG/DL (ref 8.3–10.6)
CHLORIDE SERPL-SCNC: 106 MMOL/L (ref 99–110)
CO2 SERPL-SCNC: 19 MMOL/L (ref 21–32)
CREAT SERPL-MCNC: 1.2 MG/DL (ref 0.8–1.3)
DEPRECATED RDW RBC AUTO: 15.1 % (ref 12.4–15.4)
EOSINOPHIL # BLD: 0 K/UL (ref 0–0.6)
EOSINOPHIL NFR BLD: 0.3 %
GFR SERPLBLD CREATININE-BSD FMLA CKD-EPI: 66 ML/MIN/{1.73_M2}
GLUCOSE SERPL-MCNC: 75 MG/DL (ref 70–99)
HCT VFR BLD AUTO: 41.6 % (ref 40.5–52.5)
HGB BLD-MCNC: 14.2 G/DL (ref 13.5–17.5)
LYMPHOCYTES # BLD: 0.4 K/UL (ref 1–5.1)
LYMPHOCYTES NFR BLD: 4.8 %
MCH RBC QN AUTO: 35.5 PG (ref 26–34)
MCHC RBC AUTO-ENTMCNC: 34.2 G/DL (ref 31–36)
MCV RBC AUTO: 103.8 FL (ref 80–100)
MONOCYTES # BLD: 0.5 K/UL (ref 0–1.3)
MONOCYTES NFR BLD: 6 %
NEUTROPHILS # BLD: 7.5 K/UL (ref 1.7–7.7)
NEUTROPHILS NFR BLD: 88.7 %
PLATELET # BLD AUTO: 64 K/UL (ref 135–450)
PMV BLD AUTO: 8.7 FL (ref 5–10.5)
POTASSIUM SERPL-SCNC: 4.2 MMOL/L (ref 3.5–5.1)
PROT SERPL-MCNC: 6.4 G/DL (ref 6.4–8.2)
RBC # BLD AUTO: 4.01 M/UL (ref 4.2–5.9)
SODIUM SERPL-SCNC: 134 MMOL/L (ref 136–145)
WBC # BLD AUTO: 8.5 K/UL (ref 4–11)

## 2025-03-23 PROCEDURE — 36415 COLL VENOUS BLD VENIPUNCTURE: CPT

## 2025-03-23 PROCEDURE — 97535 SELF CARE MNGMENT TRAINING: CPT

## 2025-03-23 PROCEDURE — 6370000000 HC RX 637 (ALT 250 FOR IP): Performed by: INTERNAL MEDICINE

## 2025-03-23 PROCEDURE — 89051 BODY FLUID CELL COUNT: CPT

## 2025-03-23 PROCEDURE — 97162 PT EVAL MOD COMPLEX 30 MIN: CPT

## 2025-03-23 PROCEDURE — 6360000002 HC RX W HCPCS

## 2025-03-23 PROCEDURE — 99232 SBSQ HOSP IP/OBS MODERATE 35: CPT | Performed by: INTERNAL MEDICINE

## 2025-03-23 PROCEDURE — 97165 OT EVAL LOW COMPLEX 30 MIN: CPT

## 2025-03-23 PROCEDURE — 2500000003 HC RX 250 WO HCPCS

## 2025-03-23 PROCEDURE — 85025 COMPLETE CBC W/AUTO DIFF WBC: CPT

## 2025-03-23 PROCEDURE — 6370000000 HC RX 637 (ALT 250 FOR IP): Performed by: PHYSICIAN ASSISTANT

## 2025-03-23 PROCEDURE — 1200000000 HC SEMI PRIVATE

## 2025-03-23 PROCEDURE — 80053 COMPREHEN METABOLIC PANEL: CPT

## 2025-03-23 PROCEDURE — 97530 THERAPEUTIC ACTIVITIES: CPT

## 2025-03-23 PROCEDURE — 6370000000 HC RX 637 (ALT 250 FOR IP)

## 2025-03-23 RX ORDER — MIDODRINE HYDROCHLORIDE 2.5 MG/1
2.5 TABLET ORAL
Qty: 90 TABLET | Refills: 0 | Status: SHIPPED | OUTPATIENT
Start: 2025-03-23

## 2025-03-23 RX ORDER — LACTULOSE 10 G/15ML
20 SOLUTION ORAL 3 TIMES DAILY
Qty: 2700 ML | Refills: 0 | Status: SHIPPED | OUTPATIENT
Start: 2025-03-23

## 2025-03-23 RX ORDER — MIDODRINE HYDROCHLORIDE 5 MG/1
2.5 TABLET ORAL
Status: DISCONTINUED | OUTPATIENT
Start: 2025-03-23 | End: 2025-03-25 | Stop reason: HOSPADM

## 2025-03-23 RX ADMIN — SODIUM CHLORIDE, PRESERVATIVE FREE 10 ML: 5 INJECTION INTRAVENOUS at 09:47

## 2025-03-23 RX ADMIN — DICYCLOMINE HYDROCHLORIDE 20 MG: 20 TABLET ORAL at 12:37

## 2025-03-23 RX ADMIN — Medication 500 MCG: at 09:46

## 2025-03-23 RX ADMIN — MIDODRINE HYDROCHLORIDE 2.5 MG: 5 TABLET ORAL at 12:36

## 2025-03-23 RX ADMIN — MIDODRINE HYDROCHLORIDE 2.5 MG: 5 TABLET ORAL at 16:38

## 2025-03-23 RX ADMIN — LACTULOSE 20 G: 10 SOLUTION ORAL at 12:36

## 2025-03-23 RX ADMIN — SODIUM CHLORIDE, PRESERVATIVE FREE 10 ML: 5 INJECTION INTRAVENOUS at 20:48

## 2025-03-23 RX ADMIN — TAMSULOSIN HYDROCHLORIDE 0.4 MG: 0.4 CAPSULE ORAL at 09:46

## 2025-03-23 RX ADMIN — CALCIUM POLYCARBOPHIL 625 MG: 625 TABLET, FILM COATED ORAL at 09:47

## 2025-03-23 RX ADMIN — DICYCLOMINE HYDROCHLORIDE 20 MG: 20 TABLET ORAL at 16:38

## 2025-03-23 RX ADMIN — Medication 40 MG: at 12:37

## 2025-03-23 RX ADMIN — METOPROLOL SUCCINATE 25 MG: 25 TABLET, EXTENDED RELEASE ORAL at 20:48

## 2025-03-23 RX ADMIN — FINASTERIDE 5 MG: 5 TABLET, FILM COATED ORAL at 09:46

## 2025-03-23 RX ADMIN — DICYCLOMINE HYDROCHLORIDE 20 MG: 20 TABLET ORAL at 09:46

## 2025-03-23 RX ADMIN — DICYCLOMINE HYDROCHLORIDE 20 MG: 20 TABLET ORAL at 20:48

## 2025-03-23 RX ADMIN — LACTULOSE 20 G: 10 SOLUTION ORAL at 20:48

## 2025-03-23 RX ADMIN — LACTULOSE 20 G: 10 SOLUTION ORAL at 09:46

## 2025-03-23 RX ADMIN — FLUOXETINE HYDROCHLORIDE 80 MG: 20 CAPSULE ORAL at 09:46

## 2025-03-23 NOTE — DISCHARGE SUMMARY
Hospital Medicine Discharge Summary    Patient: Enoc Mancia     Gender: male  : 1955   Age: 69 y.o.  MRN: [id number]    Admitting Physician: Maria R Benjamin DO  Discharge Physician: Maria R Benjamin DO    Code Status: Full Code     Admit Date: 3/20/2025   Discharge Date: 3/23/2025      Discharge Diagnoses:    Active Hospital Problems    Diagnosis Date Noted    Hepatic encephalopathy (HCC) [K76.82] 2025    GI bleed [K92.2] 2025    Alcoholic cirrhosis of liver with ascites (HCC) [K70.31] 2025    Chronic liver disease due to alcohol [K70.9] 2021    Hypertension [I10]     Hyperlipidemia [E78.5]     Benign prostatic hyperplasia without lower urinary tract symptoms [N40.0]      Condition at Discharge: Stable    Hospital Course:     GI bleed/melena.  -CT abd/pelvis showed no acute bleed.  -hemoccult positive.  -aspirin on hold  -hgb on admission 15, at baseline, stable  -no signs of bleeding during admission     Alcoholic cirrhosis with decompensation/ascites.   Hepatic mass, hypodense, has been stable since .  Transaminitis.   Hyperammoniemia - AMS at home  HE  -has not drank any alcohol in 3 weeks.  -s/p paracentesis (3/17) and EGD (3/18).  -CT with small volume ascites.   -lasix dc during previous admission.    -ammonia 92, started on lactulose  -continue aldactone.  -GI consulted per above.    Chronic orthostatic hypotension  - added low dose midodrine     Thrombocytopenia.  -platelet count 86, appears to have a steady decline/drop, baseline around 120.  -monitor with daily CBC.     Hyponatremia   -acute on chronic, Na 128 on admission  -likely 2/2 to cirrhosis.  -monitor.     Hypertension.    -continue Toprol XL and aldactone.      Hyperlipidemia.   -continue statin.      Bilateral hip avascular necrosis.  -follow up with PCP      Rheumatoid arthritis.  -follows with rheumatology      BPH.  -on proscar and flomax      Cannabis use.      Additional findings or notes to primary provider:

## 2025-03-23 NOTE — DISCHARGE INSTR - COC
Continuity of Care Form    Patient Name: Enoc Mancia   :  1955  MRN:  [id number]    Admit date:  3/20/2025  Discharge date:  3/25/25    Code Status Order: Full Code   Advance Directives:     Admitting Physician:  Maria R Benjamin DO  PCP: Sofia Whitt, APRN - CNP    Discharging Nurse: Fran Siddiquiarging Hospital Unit/Room#: 0229/0229-02  Discharging Unit Phone Number: [phone]    Emergency Contact:   Extended Emergency Contact Information  Primary Emergency Contact: Dara Mancia  Address: [address] [place] [address] of [facility]  Home Phone: [phone]  Work Phone: [phone]  Mobile Phone: [phone]  Relation: Spouse   needed? No    Past Surgical History:  Past Surgical History:   Procedure Laterality Date    APPENDECTOMY      CARDIAC CATHETERIZATION      COLONOSCOPY  2013    polyps    COLONOSCOPY  2021    COLONOSCOPY N/A 2021    COLON W/ANES. (9:00) **FIBROSCAN TOO** performed by Valeriy Talley MD at [facility] ENDOSCOPY    CYSTOSCOPY      OTHER SURGICAL HISTORY  2012    muscle biopsy left shoulder    UPPER GASTROINTESTINAL ENDOSCOPY  2013    dysphagia    UPPER GASTROINTESTINAL ENDOSCOPY N/A 3/18/2025    ESOPHAGOGASTRODUODENOSCOPY BIOPSY performed by Blaze Rondon DO at [facility] ENDOSCOPY    WRIST SURGERY      left       Immunization History:   Immunization History   Administered Date(s) Administered    COVID-19, PFIZER GRAY top, DO NOT Dilute, (age 12 y+), IM, 30 mcg/0.3 mL 2022    COVID-19, PFIZER PURPLE top, DILUTE for use, (age 12 y+), 30mcg/0.3mL 2021, 2021    Influenza Virus Vaccine 2015, 2016    Influenza, AFLURIA (age 3 y+), FLUZONE, (age 6 mo+), Quadv MDV, 0.5mL 2017, 2020    Influenza, FLUAD, (age 65 y+), IM, Quadv, 0.5mL 2022, 2024    Influenza, FLUAD, (age 65 y+), IM, Trivalent PF, 0.5mL 10/29/2024    Influenza, FLUCELVAX, (age 6

## 2025-03-23 NOTE — PLAN OF CARE
Problem: ABCDS Injury Assessment  Goal: Absence of physical injury  3/23/2025 1531 by Fran Gilmore RN  Outcome: Completed  3/23/2025 1530 by Fran Gilmore RN  Outcome: Progressing  3/23/2025 0525 by Clemencia Nova RN  Outcome: Progressing     Problem: Discharge Planning  Goal: Discharge to home or other facility with appropriate resources  3/23/2025 1531 by Fran Gilmore RN  Outcome: Completed  3/23/2025 1530 by Fran Gilmore RN  Outcome: Progressing  3/23/2025 0525 by Clemencia Nova RN  Outcome: Progressing     Problem: Pain  Goal: Verbalizes/displays adequate comfort level or baseline comfort level  3/23/2025 1531 by Fran Gilmore RN  Outcome: Completed  3/23/2025 0525 by Clemencia Nova RN  Outcome: Progressing     Problem: Safety - Adult  Goal: Free from fall injury  3/23/2025 1531 by Fran Gilmore RN  Outcome: Completed  3/23/2025 0525 by Clemencia Nova RN  Outcome: Progressing

## 2025-03-23 NOTE — PLAN OF CARE
Problem: ABCDS Injury Assessment  Goal: Absence of physical injury  3/23/2025 1530 by Fran Gilmore, RN  Outcome: Progressing  3/23/2025 0525 by Clemencia Nova RN  Outcome: Progressing     Problem: Discharge Planning  Goal: Discharge to home or other facility with appropriate resources  3/23/2025 1530 by Fran Gilmore, RN  Outcome: Progressing  3/23/2025 0525 by Clemencia Nova RN  Outcome: Progressing     Problem: Pain  Goal: Verbalizes/displays adequate comfort level or baseline comfort level  3/23/2025 0525 by Clemencia Nova RN  Outcome: Progressing     Problem: Safety - Adult  Goal: Free from fall injury  3/23/2025 0525 by Clemencia Nova RN  Outcome: Progressing

## 2025-03-24 ENCOUNTER — APPOINTMENT (OUTPATIENT)
Dept: ULTRASOUND IMAGING | Age: 70
DRG: 433 | End: 2025-03-24
Payer: MEDICARE

## 2025-03-24 LAB
APPEARANCE FLUID: NORMAL
BDY FLUID QUALITY: NORMAL
CELL COUNT FLUID TYPE: NORMAL
COLOR FLUID: YELLOW
LYMPHOCYTES NFR FLD: 9 %
MACROPHAGES # FLD: 79 %
MESOTHL CELL NFR FLD: 1 %
NEUTROPHIL, FLUID: 11 %
NUC CELL # FLD: 162 /CUMM
RBC FLUID: 600 /CUMM
TOTAL CELLS COUNTED FLD: 100

## 2025-03-24 PROCEDURE — 6370000000 HC RX 637 (ALT 250 FOR IP)

## 2025-03-24 PROCEDURE — 49083 ABD PARACENTESIS W/IMAGING: CPT

## 2025-03-24 PROCEDURE — 0W9G3ZZ DRAINAGE OF PERITONEAL CAVITY, PERCUTANEOUS APPROACH: ICD-10-PCS | Performed by: INTERNAL MEDICINE

## 2025-03-24 PROCEDURE — 2580000003 HC RX 258

## 2025-03-24 PROCEDURE — 6370000000 HC RX 637 (ALT 250 FOR IP): Performed by: INTERNAL MEDICINE

## 2025-03-24 PROCEDURE — 87070 CULTURE OTHR SPECIMN AEROBIC: CPT

## 2025-03-24 PROCEDURE — 6360000002 HC RX W HCPCS

## 2025-03-24 PROCEDURE — 6370000000 HC RX 637 (ALT 250 FOR IP): Performed by: PHYSICIAN ASSISTANT

## 2025-03-24 PROCEDURE — 97110 THERAPEUTIC EXERCISES: CPT

## 2025-03-24 PROCEDURE — 1200000000 HC SEMI PRIVATE

## 2025-03-24 PROCEDURE — 87205 SMEAR GRAM STAIN: CPT

## 2025-03-24 PROCEDURE — 99232 SBSQ HOSP IP/OBS MODERATE 35: CPT | Performed by: INTERNAL MEDICINE

## 2025-03-24 PROCEDURE — 97530 THERAPEUTIC ACTIVITIES: CPT

## 2025-03-24 PROCEDURE — 2500000003 HC RX 250 WO HCPCS

## 2025-03-24 RX ADMIN — SPIRONOLACTONE 50 MG: 25 TABLET ORAL at 09:02

## 2025-03-24 RX ADMIN — DICYCLOMINE HYDROCHLORIDE 20 MG: 20 TABLET ORAL at 09:01

## 2025-03-24 RX ADMIN — DICYCLOMINE HYDROCHLORIDE 20 MG: 20 TABLET ORAL at 13:15

## 2025-03-24 RX ADMIN — TAMSULOSIN HYDROCHLORIDE 0.4 MG: 0.4 CAPSULE ORAL at 09:02

## 2025-03-24 RX ADMIN — Medication 500 MCG: at 09:02

## 2025-03-24 RX ADMIN — FINASTERIDE 5 MG: 5 TABLET, FILM COATED ORAL at 09:02

## 2025-03-24 RX ADMIN — CALCIUM POLYCARBOPHIL 625 MG: 625 TABLET, FILM COATED ORAL at 09:02

## 2025-03-24 RX ADMIN — LACTULOSE 20 G: 10 SOLUTION ORAL at 13:15

## 2025-03-24 RX ADMIN — SODIUM CHLORIDE, PRESERVATIVE FREE 10 ML: 5 INJECTION INTRAVENOUS at 09:03

## 2025-03-24 RX ADMIN — Medication 40 MG: at 13:15

## 2025-03-24 RX ADMIN — LACTULOSE 20 G: 10 SOLUTION ORAL at 22:33

## 2025-03-24 RX ADMIN — Medication 40 MG: at 22:33

## 2025-03-24 RX ADMIN — LACTULOSE 20 G: 10 SOLUTION ORAL at 09:01

## 2025-03-24 RX ADMIN — METOPROLOL SUCCINATE 25 MG: 25 TABLET, EXTENDED RELEASE ORAL at 22:33

## 2025-03-24 RX ADMIN — MIDODRINE HYDROCHLORIDE 2.5 MG: 5 TABLET ORAL at 17:25

## 2025-03-24 RX ADMIN — Medication 40 MG: at 01:32

## 2025-03-24 RX ADMIN — FLUOXETINE HYDROCHLORIDE 80 MG: 20 CAPSULE ORAL at 09:02

## 2025-03-24 RX ADMIN — DICYCLOMINE HYDROCHLORIDE 20 MG: 20 TABLET ORAL at 17:25

## 2025-03-24 RX ADMIN — SODIUM CHLORIDE, PRESERVATIVE FREE 10 ML: 5 INJECTION INTRAVENOUS at 22:33

## 2025-03-24 RX ADMIN — DICYCLOMINE HYDROCHLORIDE 20 MG: 20 TABLET ORAL at 22:33

## 2025-03-24 RX ADMIN — MIDODRINE HYDROCHLORIDE 2.5 MG: 5 TABLET ORAL at 09:02

## 2025-03-24 NOTE — PROCEDURES
PROCEDURE NOTE  Date: 3/24/2025   Name: Enoc Mancia  YOB: 1955    Procedures  Image guided right Paracentesis completed.  2.1 liters of yellow colored withdrawn.  Pt tolerated procedure without any signs or symptoms of distress. Vital signs stable.     DISCHARGED:  ADMITTED: Pt transferred back to room 229. Report called to nurse.     SPECIMEN SENT:  Yes    Vital Signs  Vitals:    03/24/25 1237   BP: 112/68   Pulse:    Resp:    Temp:    SpO2:     (vital signs in table format)

## 2025-03-24 NOTE — PLAN OF CARE
Problem: Chronic Conditions and Co-morbidities  Goal: Patient's chronic conditions and co-morbidity symptoms are monitored and maintained or improved  Outcome: Progressing  Flowsheets (Taken 3/24/2025 1026)  Care Plan - Patient's Chronic Conditions and Co-Morbidity Symptoms are Monitored and Maintained or Improved:   Monitor and assess patient's chronic conditions and comorbid symptoms for stability, deterioration, or improvement   Collaborate with multidisciplinary team to address chronic and comorbid conditions and prevent exacerbation or deterioration  Note: Pt to have paracentesis today

## 2025-03-24 NOTE — PLAN OF CARE
Problem: ABCDS Injury Assessment  Goal: Absence of physical injury  3/23/2025 1531 by Fran Gilmore RN  Outcome: Completed  3/23/2025 1530 by Fran Gilmore RN  Outcome: Progressing     Problem: Discharge Planning  Goal: Discharge to home or other facility with appropriate resources  3/23/2025 1531 by Farn Gilmore, RN  Outcome: Completed  3/23/2025 1530 by Fran Gilmore RN  Outcome: Progressing     Problem: Pain  Goal: Verbalizes/displays adequate comfort level or baseline comfort level  3/23/2025 1531 by Fran Gilmore RN  Outcome: Completed     Problem: Safety - Adult  Goal: Free from fall injury  3/23/2025 1531 by Fran Gilmore RN  Outcome: Completed

## 2025-03-24 NOTE — PROCEDURES
PROCEDURE NOTE  Date: 3/24/2025   Name: Enoc Mancia  YOB: 1955    Procedures  Pt arrived for image guided right Paracentesis. Dr. Israel explained the procedure including the risk and benefits of the procedure. All questions answered. Pt verbalizes understanding of the procedure and states no more questions. Consent confirmed. Vital signs stable. Labs, allergies, medications, and code status reviewed. No contraindications noted. Time out completed prior to procedure.    Vital Signs  Vitals:    03/24/25 1219   BP: 118/80   Pulse:    Resp:    Temp:    SpO2:     (vital signs in table format)      Allergies  Codeine and Pcn [penicillins] (allergies)    Labs  Lab Results   Component Value Date    INR 1.14 03/16/2025    PROTIME 14.8 03/16/2025     Lab Results   Component Value Date    CREATININE 1.2 03/23/2025    BUN 41 (H) 03/23/2025     (L) 03/23/2025    K 4.2 03/23/2025     03/23/2025    CO2 19 (L) 03/23/2025     Lab Results   Component Value Date    WBC 8.5 03/23/2025    HGB 14.2 03/23/2025    HCT 41.6 03/23/2025    .8 (H) 03/23/2025    PLT 64 (L) 03/23/2025

## 2025-03-24 NOTE — FLOWSHEET NOTE
03/23/25 2038   Vital Signs   Temp 97.9 °F (36.6 °C)   Temp Source Oral   Pulse 84   Heart Rate Source Monitor   Respirations 18   /74   MAP (Calculated) 91   BP Location Right upper arm   BP Method Automatic   Patient Position Semi fowlers   Pain Assessment   Pain Assessment None - Denies Pain   Opioid-Induced Sedation   POSS Score 1   RASS   Martini Agitation Sedation Scale (RASS) 0   Oxygen Therapy   SpO2 91 %   O2 Device None (Room air)     Shift assessment complete.  Patient is alert and orientedx3.  Vital signs are stable.  Respirations are even and easy, no signs or symptoms of distress.  Pt denies any needs at this time.  Call light within reach.

## 2025-03-25 VITALS
WEIGHT: 162.6 LBS | HEIGHT: 70 IN | HEART RATE: 78 BPM | SYSTOLIC BLOOD PRESSURE: 125 MMHG | RESPIRATION RATE: 16 BRPM | TEMPERATURE: 96.8 F | BODY MASS INDEX: 23.28 KG/M2 | OXYGEN SATURATION: 93 % | DIASTOLIC BLOOD PRESSURE: 82 MMHG

## 2025-03-25 PROCEDURE — 97530 THERAPEUTIC ACTIVITIES: CPT

## 2025-03-25 PROCEDURE — 97116 GAIT TRAINING THERAPY: CPT

## 2025-03-25 PROCEDURE — 6370000000 HC RX 637 (ALT 250 FOR IP): Performed by: PHYSICIAN ASSISTANT

## 2025-03-25 PROCEDURE — 6370000000 HC RX 637 (ALT 250 FOR IP): Performed by: INTERNAL MEDICINE

## 2025-03-25 PROCEDURE — 6360000002 HC RX W HCPCS

## 2025-03-25 PROCEDURE — 6370000000 HC RX 637 (ALT 250 FOR IP)

## 2025-03-25 PROCEDURE — 2500000003 HC RX 250 WO HCPCS

## 2025-03-25 RX ADMIN — FINASTERIDE 5 MG: 5 TABLET, FILM COATED ORAL at 09:05

## 2025-03-25 RX ADMIN — CALCIUM POLYCARBOPHIL 625 MG: 625 TABLET, FILM COATED ORAL at 09:06

## 2025-03-25 RX ADMIN — DICYCLOMINE HYDROCHLORIDE 20 MG: 20 TABLET ORAL at 09:06

## 2025-03-25 RX ADMIN — SODIUM CHLORIDE, PRESERVATIVE FREE 10 ML: 5 INJECTION INTRAVENOUS at 09:06

## 2025-03-25 RX ADMIN — TAMSULOSIN HYDROCHLORIDE 0.4 MG: 0.4 CAPSULE ORAL at 09:05

## 2025-03-25 RX ADMIN — DICYCLOMINE HYDROCHLORIDE 20 MG: 20 TABLET ORAL at 12:07

## 2025-03-25 RX ADMIN — Medication 500 MCG: at 09:05

## 2025-03-25 RX ADMIN — MIDODRINE HYDROCHLORIDE 2.5 MG: 5 TABLET ORAL at 12:07

## 2025-03-25 RX ADMIN — FLUOXETINE HYDROCHLORIDE 80 MG: 20 CAPSULE ORAL at 09:05

## 2025-03-25 RX ADMIN — LACTULOSE 20 G: 10 SOLUTION ORAL at 09:06

## 2025-03-25 RX ADMIN — Medication 40 MG: at 12:07

## 2025-03-25 RX ADMIN — SPIRONOLACTONE 50 MG: 25 TABLET ORAL at 09:05

## 2025-03-25 RX ADMIN — MIDODRINE HYDROCHLORIDE 2.5 MG: 5 TABLET ORAL at 09:05

## 2025-03-25 RX ADMIN — LACTULOSE 20 G: 10 SOLUTION ORAL at 13:34

## 2025-03-25 NOTE — DISCHARGE SUMMARY
Name:  Enoc Mancia  Room:  0229/0229-02  MRN:    [id number]    Discharge Summary      This discharge summary is in conjunction with a complete physical exam done on the day of discharge.    Discharging Physician: Dr. Wilson       Admit: 3/20/2025  Discharge:  3/25/2025    HPI taken from admission H&P:      The patient is a 69 y.o. male  with PMH GERD, cirrhosis, depression, enlarged prostate, HLD, HTN, RA, seizures who presents to [facility] with black stool.  Wife at bedside states that he has been intermittently confused since yesterday afternoon.  He is currently alert and oriented x 3 on exam.  Patient reports that he has been having abdominal pain, nausea, decreased appetite, and diarrhea.  Patient reports this was dark brown, however, wife reports that it was black.  He denies any vomiting, shortness of breath, chest pain. Had recent EGD 3/18 that showed grade I esophageal varices and portal hypertensive gastropathy.       Diagnoses this Admission and Hospital Course   GI bleed/melena.  -CT abd/pelvis showed no acute bleed.  -hemoccult positive.  -hgb on admission 15, at baseline.  -monitor h/h q6h, transfuse if hgb <7 or symptomatic.  -PPI BID.  -NPO.  -GI consulted.   -hold home ASA.     Alcoholic cirrhosis with decompensation/ascites.   Hepatic mass, hypodense, has been stable since 2012.  Transaminitis.   Hyperammoniemia - AMS at home  HE  -has not drank any alcohol in 3 weeks.  -s/p paracentesis (3/17) and EGD (3/18).  -CT with small volume ascites.   -lasix dc during previous admission.    -ammonia 92, started on lactulose  -continue aldactone.  -GI consulted per above.  - paracentesis planned again as outpatient arranged by GI however pt is staying for SNF placement  - paracentesis ordered inpatient-paracentesis completed today 3/24/2025 2.1 L of ascites fluid drained.     Continue weekly paracentesis; lactulose started this admission continue the same on discharge     Thrombocytopenia.  -platelet

## 2025-03-25 NOTE — CARE COORDINATION
DISCHARGE ORDER  Date/Time 3/25/2025 1:24 PM  Completed by: Ariela Negron RN, Case Management    Patient Name: Enoc Mancia    : 1955      Admit order Date and Status:3/20/25 IP  Noted discharge order. (verify MD's last order for status of admission/Traditional Medicare 3 [place] Inpatient qualifying stay required for SNF)    Confirmed discharge plan with:              Patient:  Yes              When pt confirms DC plan does any support person need to be contacted by CM Yes spouse Poonam in room                Discharge to Facility: [facility]   Facility phone number for staff giving report: [phone]   Pre-certification completed: yes. Able to accept today per [facility] Exemption Notification (HENS) completed: yes   Discharge orders and Continuity of Care faxed to facility:  [facility]      Transportation:               Medical Transport explained with choice list offered to pt/family.                Choice:(no preference)  Agency used: [facility]   time:   1530      Pt/family/Nursing/Facility aware of  time: 1530  Yes Names: Enoc Titus, Poonam, [facility], Fran, DON  Ambulance form completed:  yes:      Date Last IMM Given: 3/23    Comments:    Pt is being d/c'd to [facility] today. Pt's O2 sats are 93% on RA.    Discharge timeout done with Pt,RN,CM. All discharge needs and concerns addressed.    Discharging nurse to complete BASIM, reconcile AVS, and place final copy with patient's discharge packet. Discharging RN to ensure that written prescriptions for  Level II medications are sent with patient to the facility as per protocol.      
Chart reviewed. Plan for SNF. Requested [facility]. Referral called to [facility]. Awaiting decision.     1240- [facility] able to accept. No bed available until Wednesday. Precert started.   
[facility] at [facility] approved. Able to DC to facility when medically stable. CM following.  
Order for dc noted. Spoke with pt and pt seems confused. Discussed therapy rec's for rehab and that CM understanding is pt will reteurn home with wife today and [facility]    Received call from a ariadne of the pt's wife stating pt cannot return home. Needs rehab. Discussed with friend that CM will call and discuss with pt wife.      Spoke with pt wife re: therapy rec's and CM understanding that plan is for pt to rturn home with [facility]. Wife mariella did  not know therapy rec's this AM and would like rehab if this is therapy rec's. [facility]. Will call referral on Monday. Pt wife aware will need prior auth completion prior to dc.  
Representative Agree with the Discharge Plan?      Ariela Negron RN  Case Management Department  Ph: [phone] Fax: [phone]    
[phone]

## 2025-03-25 NOTE — PLAN OF CARE
Problem: Chronic Conditions and Co-morbidities  Goal: Patient's chronic conditions and co-morbidity symptoms are monitored and maintained or improved  3/25/2025 1243 by Fran Gilmore RN  Outcome: Completed  3/25/2025 1103 by Fran Gilmore, RN  Outcome: Progressing  3/25/2025 0214 by Quincy Corral RN  Outcome: Progressing     Problem: Pain  Goal: Verbalizes/displays adequate comfort level or baseline comfort level  3/25/2025 1243 by Fran Gilmore, RN  Outcome: Completed  3/25/2025 1103 by Fran Gilmore RN  Outcome: Progressing

## 2025-03-25 NOTE — PROGRESS NOTES
Progress Note    Patient Enoc Mancia  MRN: [id number]  YOB: 1955 Age: 69 y.o. Sex: male  Room: [address] [place] [address]       Admitting Physician: Maria R Benjamin DO   Date of Admission: 3/20/2025 12:20 PM   Primary Care Physician: Sofia Whitt, APRN - CNP     Subjective:  Enoc Mancia was seen and examined. We are following for GI bleed.  -- Hemoglobin has remained stable, no evidence of GI bleeding.  Patient states has had 2-3 bowel movements today.    ROS:  Constitutional: Denies fever, no change in appetite  Respiratory: Denies cough or shortness of breath  Cardiovascular: Denies chest pain or edema    Objective:  Vital Signs:   Vitals:    03/22/25 1253   BP: 123/79   Pulse: 77   Resp:    Temp: 97.7 °F (36.5 °C)   SpO2: 95%         Physical Exam:  Constitutional: Alert and oriented x 4. No acute distress.   HEENT: Sclera anicteric, mucosal membranes moist  Cardiovascular: Regular rate and rhythm.  No murmurs.  Respiratory: Respirations nonlabored, no crepitus  GI: Abdomen nondistended, soft, and nontender.  Normal active bowel sounds.  No masses palpable.   Rectal: Deferred  Musculoskeletal:  No pitting edema of the lower legs.  Neurological: Gross memory appears intact.  No asterixis    Intake/Output:    Intake/Output Summary (Last 24 hours) at 3/22/2025 1659  Last data filed at 3/21/2025 2219  Gross per 24 hour   Intake 300 ml   Output --   Net 300 ml        Current Medications:  Current Facility-Administered Medications   Medication Dose Route Frequency Provider Last Rate Last Admin    lactulose (CHRONULAC) 10 GM/15ML solution 20 g  20 g Oral TID Nataly Smith PA-C   20 g at 03/22/25 1426    [Held by provider] aspirin chewable tablet 81 mg  81 mg Oral Daily Tati Hemphill PA-C        dicyclomine (BENTYL) tablet 20 mg  20 mg Oral 4x Daily Tati Hemphill PA-C   20 mg at 03/22/25 1250    finasteride (PROSCAR) tablet 5 mg  5 mg Oral Daily Tati Hemphill PA-C   5 mg at 
/70   Pulse 79   Temp 97.5 °F (36.4 °C) (Oral)   Resp 16   Ht 1.778 m (5' 10\")   Wt 73.8 kg (162 lb 9.6 oz)   SpO2 97%   BMI 23.33 kg/m²     Assessment complete. Meds passed. Pt denies needs at this time. Up to bathroom with RW, is a bit impulsive. Wife at bedside, pleasant and follows commands. Tolerating diet, no bloody Bms noted. Didn't eat much of breakfast today. Unsure of day of week and date.        Bedside Mobility Assessment Tool (BMAT):     Assessment Level 1- Sit and Shake    1. From a semi-reclined position, ask patient to sit up and rotate to a seated position at the side of the bed. Can use the bedrail.    2. Ask patient to reach out and grab your hand and shake making sure patient reaches across his/her midline.   Pass- Patient is able to come to a seated position, maintain core strength. Maintains seated balance while reaching across midline. Move on to Assessment Level 2.     Assessment Level 2- Stretch and Point   1. With patient in seated position at the side of the bed, have patient place both feet on the floor (or stool) with knees no higher than hips.    2. Ask patient to stretch one leg and straighten the knee, then bend the ankle/flex and point the toes. If appropriate, repeat with the other leg.   Pass- Patient is able to demonstrate appropriate quad strength on intended weight bearing limb(s). Move onto Assessment Level 3.     Assessment Level 3- Stand   1. Ask patient to elevate off the bed or chair (seated to standing) using an assistive device (cane, bedrail).    2. Patient should be able to raise buttocks off be and hold for a count of five. May repeat once.   Pass- Patient maintains standing stability for at least 5 seconds, proceed to assessment level 4.    Assessment Level 4- Walk   1. Ask patient to march in place at bedside.    2. Then ask patient to advance step and return each foot. Some medical conditions may render a patient from stepping backwards, use your best 
/77   Pulse 77   Temp 97.3 °F (36.3 °C) (Oral)   Resp 16   Ht 1.778 m (5' 10\")   Wt 73.8 kg (162 lb 9.6 oz)   SpO2 98%   BMI 23.33 kg/m²     Assessment complete. Meds passed. Pt denies needs at this time. States dizziness while moving today, PT OT shows ortho stasis per their assessment. Notified physician, orders for midodrine added per medicine. Some confusion this morning. Drinking some, however oral food PO intake has been poor. No family present at bedside currently        Bedside Mobility Assessment Tool (BMAT):     Assessment Level 1- Sit and Shake    1. From a semi-reclined position, ask patient to sit up and rotate to a seated position at the side of the bed. Can use the bedrail.    2. Ask patient to reach out and grab your hand and shake making sure patient reaches across his/her midline.   Pass- Patient is able to come to a seated position, maintain core strength. Maintains seated balance while reaching across midline. Move on to Assessment Level 2.     Assessment Level 2- Stretch and Point   1. With patient in seated position at the side of the bed, have patient place both feet on the floor (or stool) with knees no higher than hips.    2. Ask patient to stretch one leg and straighten the knee, then bend the ankle/flex and point the toes. If appropriate, repeat with the other leg.   Pass- Patient is able to demonstrate appropriate quad strength on intended weight bearing limb(s). Move onto Assessment Level 3.     Assessment Level 3- Stand   1. Ask patient to elevate off the bed or chair (seated to standing) using an assistive device (cane, bedrail).    2. Patient should be able to raise buttocks off be and hold for a count of five. May repeat once.   Pass- Patient maintains standing stability for at least 5 seconds, proceed to assessment level 4.    Assessment Level 4- Walk   1. Ask patient to march in place at bedside.    2. Then ask patient to advance step and return each foot. Some medical 
/89   Pulse 74   Temp 97 °F (36.1 °C) (Axillary)   Resp 18   Ht 1.778 m (5' 10\")   Wt 73.8 kg (162 lb 9.6 oz)   SpO2 93%   BMI 23.33 kg/m²     Assessment complete. Meds passed. Pt denies needs at this time. Up to bathroom with a couple of small BMS.  Wife at bedside, updated on POC. D/c'd telesitter to hopefully discharge patient tomorrow to [facility], educated patient and family on using call light, call light in reach,and bed alarm is on. Pleasant, POOR food intake however is starting to use ensure supplements.         Bedside Mobility Assessment Tool (BMAT):     Assessment Level 1- Sit and Shake    1. From a semi-reclined position, ask patient to sit up and rotate to a seated position at the side of the bed. Can use the bedrail.    2. Ask patient to reach out and grab your hand and shake making sure patient reaches across his/her midline.   Pass- Patient is able to come to a seated position, maintain core strength. Maintains seated balance while reaching across midline. Move on to Assessment Level 2.     Assessment Level 2- Stretch and Point   1. With patient in seated position at the side of the bed, have patient place both feet on the floor (or stool) with knees no higher than hips.    2. Ask patient to stretch one leg and straighten the knee, then bend the ankle/flex and point the toes. If appropriate, repeat with the other leg.   Pass- Patient is able to demonstrate appropriate quad strength on intended weight bearing limb(s). Move onto Assessment Level 3.     Assessment Level 3- Stand   1. Ask patient to elevate off the bed or chair (seated to standing) using an assistive device (cane, bedrail).    2. Patient should be able to raise buttocks off be and hold for a count of five. May repeat once.   Pass- Patient maintains standing stability for at least 5 seconds, proceed to assessment level 4.    Assessment Level 4- Walk   1. Ask patient to march in place at bedside.    2. Then ask patient 
4 Eyes Skin Assessment     NAME:  Enoc Mancia  YOB: 1955  MEDICAL RECORD NUMBER:  [id number]    The patient is being assessed for  Admission    I agree that at least one RN has performed a thorough Head to Toe Skin Assessment on the patient. ALL assessment sites listed below have been assessed.      Areas assessed by both nurses:    Head, Face, Ears, Shoulders, Back, Chest, Arms, Elbows, Hands, Sacrum. Buttock, Coccyx, Ischium, Legs. Feet and Heels, and Under Medical Devices         Does the Patient have a Wound? No noted wound(s)       Evan Prevention initiated by RN: No  Wound Care Orders initiated by RN: No    Pressure Injury (Stage 3,4, Unstageable, DTI, NWPT, and Complex wounds) if present, place Wound referral order by RN under : No    New Ostomies, if present place, Ostomy referral order under : No     Nurse 1 eSignature: Electronically signed by Claudio Rhodes RN on 3/21/25 at 2:44 AM EDT    **SHARE this note so that the co-signing nurse can place an eSignature**    Nurse 2 eSignature: Electronically signed by Swetha Marshall RN on 3/21/25 at 3:11 AM EDT   
Called routine consult, Dr. Frye on call.  
[place] [facility]    Daily Progress Note for 3/24/2025 4:13 PM 0229/0229-02  Enoc Mancia : 1955 Age: 69 y.o. Sex: male  Length of Stay:  4    Interval History:      CC: F/U Altered Mental Status (Confused since yesterday afternoon.  Having black stool.  Recent admission this week.  Had egd done that family states was normal.)    Subjective:     3/23  Mr. Mancia says he feels a little better. He was dizzy when he got up in the morning but this resolved later in the day.   3/24  -S/p repeat paracentesis today 2.1 L of ascites fluid drained.  Patient looks ill and fatigued but in no distress no pain.  He is weak-SNF referral in place    Objective:     Vitals:    25 1219 25 1237 25 1312 25 1554   BP: 118/80 112/68 118/73 117/77   Pulse:   74 74   Resp:   16 20   Temp:   97.3 °F (36.3 °C) 97.4 °F (36.3 °C)   TempSrc:   Oral Oral   SpO2:   93% 94%   Weight:       Height:              Intake/Output Summary (Last 24 hours) at 3/24/2025 1613  Last data filed at 3/24/2025 1321  Gross per 24 hour   Intake 420 ml   Output --   Net 420 ml     Body mass index is 23.33 kg/m².    Physical Exam:  General: Cooperative, pleasant/Ill appearing, on  Nasal cannula.  Patient appears ill and fatigued  HEENT:  Head: normocephalic,atraumatic, anicteric sclera, clear conjunctiva  Neck: Normal size, Jugular venous pulsations: normal  Respiratory:unlabored breathing, clear to auscultation with no crackles, wheezes rhonchi  Heart: Regular rate and rhythm, S1, S2-normal, No murmurs  Abdomen: soft, nondistended, nontender, normoactive bowel sounds,  Neurological/Psych: Alert and oriented times three, no focal neurological deficits, Mood and affect appropriate.  Skin: No obvious rashes    Extremities:  no edema, Pedal pulses 2+ bilaterally    Scheduled Medications:  midodrine, 2.5 mg, TID WC  lactulose, 20 g, TID  [Held by provider] aspirin, 81 mg, Daily  dicyclomine, 20 mg, 4x 
Gave report to Claudio Barber RN transport at bedside.   
[place] [facility]    Daily Progress Note for 3/21/2025 8:07 PM 0229/0229-02  Enoc Mancia : 1955 Age: 69 y.o. Sex: male  Length of Stay:  1    Interval History:      CC: F/U Altered Mental Status (Confused since yesterday afternoon.  Having black stool.  Recent admission this week.  Had egd done that family states was normal.)    Subjective:       No bleeding seen. Family reports he has had some confusion.     Objective:     Vitals:    25 2030 25 2355 25 0821 25 1330   BP: 124/83 106/60 114/76 111/69   Pulse: 78 73 75 75   Resp: 16 15 16 16   Temp: 97.3 °F (36.3 °C) 98.2 °F (36.8 °C) 98.5 °F (36.9 °C) 98.6 °F (37 °C)   TempSrc: Oral Oral Oral Axillary   SpO2: 97% 91% 95% 96%   Weight: 73.8 kg (162 lb 9.6 oz)      Height: 1.778 m (5' 10\")           No intake or output data in the 24 hours ending 25  Body mass index is 23.33 kg/m².    Physical Exam:  General: Cooperative, pleasant/Ill appearing, on  Nasal cannula  HEENT:  Head: normocephalic,atraumatic, anicteric sclera, clear conjunctiva  Neck: Normal size, Jugular venous pulsations: normal  Respiratory:unlabored breathing, clear to auscultation with no crackles, wheezes rhonchi  Heart: Regular rate and rhythm, S1, S2-normal, No murmurs  Abdomen: soft, nondistended, nontender, normoactive bowel sounds,  Neurological/Psych: Alert and oriented times three, no focal neurological deficits, Mood and affect appropriate.  Skin: No obvious rashes    Extremities:  no edema, Pedal pulses 2+ bilaterally    Scheduled Medications:  lactulose, 20 g, TID  [Held by provider] aspirin, 81 mg, Daily  dicyclomine, 20 mg, 4x Daily  finasteride, 5 mg, Daily  FLUoxetine, 80 mg, Daily  metoprolol succinate, 25 mg, Nightly  polycarbophil, 625 mg, Daily  spironolactone, 50 mg, Daily  tamsulosin, 0.4 mg, Daily  vitamin B-12, 500 mcg, Daily  sodium chloride flush, 5-40 mL, 2 times per day  pantoprazole (PROTONIX) 40 mg in 
[place] [facility]    Daily Progress Note for 3/22/2025 9:09 PM 0229/0229-02  Enoc Mancia : 1955 Age: 69 y.o. Sex: male  Length of Stay:  2    Interval History:      CC: F/U Altered Mental Status (Confused since yesterday afternoon.  Having black stool.  Recent admission this week.  Had egd done that family states was normal.)    Subjective:       Still seems a little confused but better than yesterday. Having BMs.     Objective:     Vitals:    25 2044 25 0300 25 0815 25 1253   BP: (!) 115/95 114/69 119/70 123/79   Pulse: 78 82 79 77   Resp: 17 17 16    Temp: 98.6 °F (37 °C) 97.5 °F (36.4 °C) 97.5 °F (36.4 °C) 97.7 °F (36.5 °C)   TempSrc: Oral Oral Oral Oral   SpO2: 95% 96% 97% 95%   Weight:       Height:              Intake/Output Summary (Last 24 hours) at 3/22/2025 210  Last data filed at 3/21/2025 2219  Gross per 24 hour   Intake 300 ml   Output --   Net 300 ml     Body mass index is 23.33 kg/m².    Physical Exam:  General: Cooperative, pleasant/Ill appearing, on  Nasal cannula  HEENT:  Head: normocephalic,atraumatic, anicteric sclera, clear conjunctiva  Neck: Normal size, Jugular venous pulsations: normal  Respiratory:unlabored breathing, clear to auscultation with no crackles, wheezes rhonchi  Heart: Regular rate and rhythm, S1, S2-normal, No murmurs  Abdomen: soft, nondistended, nontender, normoactive bowel sounds,  Neurological/Psych: Alert and oriented times three, no focal neurological deficits, Mood and affect appropriate.  Skin: No obvious rashes    Extremities:  no edema, Pedal pulses 2+ bilaterally    Scheduled Medications:  lactulose, 20 g, TID  [Held by provider] aspirin, 81 mg, Daily  dicyclomine, 20 mg, 4x Daily  finasteride, 5 mg, Daily  FLUoxetine, 80 mg, Daily  metoprolol succinate, 25 mg, Nightly  polycarbophil, 625 mg, Daily  spironolactone, 50 mg, Daily  tamsulosin, 0.4 mg, Daily  vitamin B-12, 500 mcg, Daily  sodium chloride flush, 
Inpatient Occupational Therapy Evaluation & Treatment    Unit: [facility]  Date:  3/23/2025  Patient Name:    Enoc Mancia  Admitting diagnosis:  GI bleed [K92.2]  Alcoholic cirrhosis of liver with ascites (HCC) [K70.31]  Gastrointestinal hemorrhage, unspecified gastrointestinal hemorrhage type [K92.2]  Admit Date:  3/20/2025  Precautions/Restrictions/WB Status/ Lines/ Wounds/ Oxygen: Fall risk, Bed/chair alarm, Confusion, Continuous pulse oximetry, and Telesitter    Pt seen for cotreatment this date due to patient safety, patient endurance, acute illness/injury, and limited functional status information    Treatment Time:  0849-0940  Treatment Number:  1  Timed Code Treatment Minutes: 51 minutes  Total Treatment Minutes:  51 minutes    Patient Goals for Therapy: none stated          Discharge Recommendations: SNF  DME needs for discharge: Defer to facility       Therapy recommendations for staff:   Assist of 2 for transfers with use of rolling walker (RW) and gait belt to/from [facility]    History of Present Illness:   Per H&P Tati Hemphill PA-C   Chief complaint: AMS, +black stool  Recent admission this week    69 y.o. male  with PMH GERD, cirrhosis, depression, enlarged prostate, HLD, HTN, RA, seizures who presents to [facility] with black stool.  Wife at bedside states that he has been intermittently confused since yesterday afternoon.  He is currently alert and oriented x 3 on exam.  Patient reports that he has been having abdominal pain, nausea, decreased appetite, and diarrhea.  Patient reports this was dark brown, however, wife reports that it was black.  He denies any vomiting, shortness of breath, chest pain. Had recent EGD 3/18 that showed grade I esophageal varices and portal hypertensive gastropathy.     History obtained from the patient and review of EMR.     Per GI, pt looks good with no further bleeding +stable hemoglobin (3/22/25)    Preadmission Environment:   Pt. Lives     with spouse  Home 
Inpatient Physical Therapy Evaluation & Treatment    Unit: [facility]  Date:  3/23/2025  Patient Name:    Enoc Mancia  Admitting diagnosis:  GI bleed [K92.2]  Alcoholic cirrhosis of liver with ascites (HCC) [K70.31]  Gastrointestinal hemorrhage, unspecified gastrointestinal hemorrhage type [K92.2]  Admit Date:  3/20/2025  Precautions/Restrictions/WB Status/ Lines/ Wounds/ Oxygen: Fall risk, Bed/chair alarm, Lines (IV), Confusion, and Telemetry      Pt seen for cotreatment this date due to patient safety, patient endurance, complexity of condition, acute illness/injury, and need for the assistance of 2 skilled therapists    Treatment Time:  8:49-9:40  Treatment Number:  1   Timed Code Treatment Minutes: 41 minutes  Total Treatment Minutes:  51  minutes    Patient Stated Goals for Therapy: \" I need to use the bathroom \"          Discharge Recommendations: SNF  DME needs for discharge: Defer to facility       Therapy recommendation for EMS Transport: requires transport by cot due to pt needs A x 2 for safe transfers    Therapy recommendations for staff:   Assist of 2 for transfers with use of rolling walker (RW) and gait belt to/from [facility]  to/from chair    History of Present Illness:   The patient is a 69 y.o. male  with PMH GERD, cirrhosis, depression, enlarged prostate, HLD, HTN, RA, seizures who presents to [facility] with black stool.  Wife at bedside states that he has been intermittently confused since yesterday afternoon.  He is currently alert and oriented x 3 on exam.  Patient reports that he has been having abdominal pain, nausea, decreased appetite, and diarrhea.  Patient reports this was dark brown, however, wife reports that it was black.  He denies any vomiting, shortness of breath, chest pain. Had recent EGD 3/18 that showed grade I esophageal varices and portal hypertensive gastropathy.      History obtained from the patient and review of EMR.      Past Medical History:    Past Medical 
Inpatient Physical Therapy Treatment    Unit: [facility] [place]  Date:  3/26/2025  Patient Name:    Enoc Mancia  Admitting diagnosis:  GI bleed [K92.2]  Alcoholic cirrhosis of liver with ascites (HCC) [K70.31]  Gastrointestinal hemorrhage, unspecified gastrointestinal hemorrhage type [K92.2]  Admit Date:  3/20/2025  Precautions/Restrictions/WB Status/ Lines/ Wounds/ Oxygen: Fall risk, Bed/chair alarm, and Telemetry      Treatment Time:  1035- 1105  Treatment Number:  2   Timed Code Treatment Minutes: 30 minutes  Total Treatment Minutes:  30  minutes    Patient Stated Goals for Therapy: none stated          Discharge Recommendations: SNF  DME needs for discharge: Defer to facility       Therapy recommendation for EMS Transport: can transport by wheelchair    Therapy recommendations for staff:   Assist of 1 for ambulation with use of rolling walker (RW) and gait belt to/from chair  to/from bathroom    History of Present Illness:   The patient is a 69 y.o. male  with PMH GERD, cirrhosis, depression, enlarged prostate, HLD, HTN, RA, seizures who presents to [facility] with black stool.  Wife at bedside states that he has been intermittently confused since yesterday afternoon.  He is currently alert and oriented x 3 on exam.  Patient reports that he has been having abdominal pain, nausea, decreased appetite, and diarrhea.  Patient reports this was dark brown, however, wife reports that it was black.  He denies any vomiting, shortness of breath, chest pain. Had recent EGD 3/18 that showed grade I esophageal varices and portal hypertensive gastropathy.      History obtained from the patient and review of EMR.     Preadmission Environment:   Pt. Lives                                              with spouse  Home environment:                            two story home - bedroom & full bath on first floor  Steps to enter first floor:                     \"Just a couple\" steps to enter, no handrails  Steps to second 
Inpatient orders for paracentesis placed by physician  
Messaged on call doctor regarding orders for paracentesis tomorrow and for reconsulting of Dr Rondon per patient's family request  
Ok for clear liquids per dr mckeon  
Patient arrived to unit alert and able to answer all questions. Wife was at bedside. Patient verified his name, , month, year and where he currently was. Skin assessment completed, dressing intact to right side for paracentesis completed Monday. Patient oriented to room, call light and educated on plan of care and calling prior to getting up, he verbalized understanding. Fall precautions in place. Call light and bedside table within reach.   
Pt leaving via ems to [facility]. Discharge instructions given. Pt voiced understanding. Copy of discharge and scripts with patient. Iv removed. CP and PE completed. No further needs at discharge. Pt leaving with all personal belonging.     
Pt sitting in bed this AM during shift assessment. He is alert and oriented, but slow to respond. Pt has poor appetite. Accepting of medications at this time. Pt aware he is to go for paracentesis today. Wife aware as well. Tele and telesitter remain in  place. IV intact. Call light within reach. No new concerns at this time.     1325: Paracentesis completed. Pt back in bed. Awake and alert. Vitals stable. Family at bedside.     Bedside Mobility Assessment Tool (BMAT):     Assessment Level 1- Sit and Shake    1. From a semi-reclined position, ask patient to sit up and rotate to a seated position at the side of the bed. Can use the bedrail.    2. Ask patient to reach out and grab your hand and shake making sure patient reaches across his/her midline.   Pass- Patient is able to come to a seated position, maintain core strength. Maintains seated balance while reaching across midline. Move on to Assessment Level 2.     Assessment Level 2- Stretch and Point   1. With patient in seated position at the side of the bed, have patient place both feet on the floor (or stool) with knees no higher than hips.    2. Ask patient to stretch one leg and straighten the knee, then bend the ankle/flex and point the toes. If appropriate, repeat with the other leg.   Pass- Patient is able to demonstrate appropriate quad strength on intended weight bearing limb(s). Move onto Assessment Level 3.     Assessment Level 3- Stand   1. Ask patient to elevate off the bed or chair (seated to standing) using an assistive device (cane, bedrail).    2. Patient should be able to raise buttocks off be and hold for a count of five. May repeat once.   Pass- Patient maintains standing stability for at least 5 seconds, proceed to assessment level 4.    Assessment Level 4- Walk   1. Ask patient to march in place at bedside.    2. Then ask patient to advance step and return each foot. Some medical conditions may render a patient from stepping backwards, use 
RN and case management had conversation with patient's wife regarding transfer to SNF vs dc home with home health care. Upon further discussion, plan is to keep patient overnight and attempt SNF transfer to [facility] tomorrow or the next couple days. Will dc tele sitter now DT SNFs not accepting patient's with [employer] systems.     Previously, on 3/22 this RN and patient's wife discussed possible need of a SNF for short term rehab, wife was in agree ance, and this RN messaged the on-call physician and orders were obtained for PT and OT.     On 3/23, this RN seen the patient's wife this morning and started to discuss discharge disposition/Plan of care for the patient and the patient's wife stated that she had spoke with her sons and friends, and states that she had a strong support system for the patient at home. Patient's wife at this time wishes to take patient home with home nursing, home PT, and home OT. RN notified  and physician over family's decision. Wife notified of orthostatic hypotension this AM, and that physician started patient on midodrine to assist with orthostatic symptoms including dizziness. Earlier, patient had gotten up to the bathroom to have a BM, and while traveling back to  bed patient denies dizziness symptoms.     As of current, Medicine, case management, and nursing are in agreeance that the current POC is for patient to DC to SNF per PT/OT recommendations. Wife aware of current Plan of care. Discharge order discontinued.   
Report called to ml at [facility]  
Shift report given to Desi at bedside. Patient is stable. All end of shift needs have been met. No further assistance needed at this time.    
Shift report given to Fran at bedside. Patient is stable. All end of shift needs have been met. No further assistance needed at this time.    
Urine and Stool sample collected for C. Diff. And sent to Lab.  
500 mcg, Daily  sodium chloride flush, 5-40 mL, 2 times per day  pantoprazole (PROTONIX) 40 mg in sodium chloride (PF) 0.9 % 10 mL injection, 40 mg, Q12H        PRN Medications:  sodium chloride flush, 5-40 mL, PRN  sodium chloride, , PRN  ondansetron, 4 mg, Q8H PRN   Or  ondansetron, 4 mg, Q6H PRN  acetaminophen, 650 mg, Q6H PRN   Or  acetaminophen, 650 mg, Q6H PRN          Data Review:      Laboratory Data Reviewed:    CBC:  Lab Results   Component Value Date    WBC 8.5 03/23/2025    HGB 14.2 03/23/2025    HCT 41.6 03/23/2025    .8 (H) 03/23/2025    PLT 64 (L) 03/23/2025         Basic Metabolic Panel  Lab Results   Component Value Date/Time     03/23/2025 06:18 AM     03/23/2025 06:18 AM    CO2 19 03/23/2025 06:18 AM    GLUCOSE 75 03/23/2025 06:18 AM    BUN 41 03/23/2025 06:18 AM    CREATININE 1.2 03/23/2025 06:18 AM       HEPATIC PANEL   Lab Results   Component Value Date/Time     03/23/2025 06:18 AM    ALT 94 03/23/2025 06:18 AM       Lab Results   Component Value Date    CKTOTAL 24 (L) 08/08/2012    TROPONINI <0.01 08/21/2021       Lab Results   Component Value Date/Time    INR 1.14 03/16/2025 05:13 PM    INR 1.15 03/14/2025 12:25 PM    INR 1.04 03/06/2025 10:51 PM       Test Review:        Radiology reviewed:     CTA ABDOMEN PELVIS W WO CONTRAST   Final Result   Negative CTA for active GI bleed.      Small volume ascites.         CT HEAD WO CONTRAST   Final Result   No acute intracranial abnormality.           Lab Results   Component Value Date    LABA1C 5.5 06/01/2021      Body mass index is 23.33 kg/m².       Impression/Plan:      ASSESSMENT/PLAN:    GI bleed/melena.  -CT abd/pelvis showed no acute bleed.  -hemoccult positive.  -hgb on admission 15, at baseline.  -monitor h/h q6h, transfuse if hgb <7 or symptomatic.  -PPI BID.  -NPO.  -GI consulted.   -hold home ASA.     Alcoholic cirrhosis with decompensation/ascites.   Hepatic mass, hypodense, has been stable since 
may render a patient from stepping backwards, use your best clinical judgement.   Fail- Patient not able to complete tasks OR requires use of assistive device. Patient is MOBILITY LEVEL 3.       Mobility Level- 3     
Difficile Toxin/Antigen [[id number]] Collected: 03/17/25 1800    Order Status: Completed Specimen: Stool Updated: 03/17/25 1848     C.diff Toxin/Antigen --     Negative for Clostridium difficile antigen and toxin  Normal Range: Negative      Narrative:      ORDER#: [id number]                          ORDERED BY: HEATH GUERRERO  SOURCE: Stool                              COLLECTED:  03/17/25 18:00  ANTIBIOTICS AT BOO.:                      RECEIVED :  03/17/25 18:04  Collect White vial (sterile container)    Culture, Body Fluid (with Gram Stain) [[id number]] Collected: 03/17/25 1000    Order Status: Completed Specimen: Body Fluid from Ascitic Fluid Updated: 03/20/25 0714     Body Fluid Culture, Sterile No growth 60 to 72 hours     Gram Stain Result Cytospin performed,no quantitation  Gram positive cocci present  WBC's present  No Epithelial Cells seen      Narrative:      ORDER#: [id number]                          ORDERED BY: JANET ALEMAN  SOURCE: Ascites                            COLLECTED:  03/17/25 10:00  ANTIBIOTICS AT BOO.:                      RECEIVED :  03/17/25 10:29    COVID-19 & Influenza Combo [[id number]] Collected: 03/16/25 2144    Order Status: Completed Specimen: Nasopharyngeal Swab Updated: 03/16/25 2211     SARS-CoV-2 RNA, RT PCR NOT DETECTED     Comment: Not Detected results do not preclude SARS-CoV-2 infection and  should not be used as the sole basis for patient management  decisions.  Results must be combined with clinical observations,  patient history, and epidemiological information.  Testing was performed using YOGI STEVE SARS-CoV-2, Influenza A/B  and Respiratory Syncytial Virus nucleic acid assay. This  test is a multiplex Real-Time Reverse Transcriptase Polymerase Chain  Reaction (RT-PCR)-based in vitro diagnostic test intended for the  qualitative detection of nucleic acids from SARS-CoV-2,  influenza A,influenza B and Respiratory Syncytial Virus  in nasopharyngeal and nasal 
Good  Strengths for achieving goals include: PLOF and Pt cooperative   Barriers to achieving goals include:  Complexity of condition    Plan:  To be seen 3-5 x/ week while in acute care setting for therapeutic exercises/activities, bed mobility training, functional transfer training, family/patient education, ADL/IADL retraining, energy conservation training, cognitive interventions, and balance training.    Electronically signed by Mirna Em OT on 3/24/2025 at 4:43 PM      If patient discharges from this facility prior to next visit, this note will serve as the Discharge Summary

## 2025-03-25 NOTE — PLAN OF CARE
Problem: Chronic Conditions and Co-morbidities  Goal: Patient's chronic conditions and co-morbidity symptoms are monitored and maintained or improved  3/25/2025 1103 by Fran Gilmore, RN  Outcome: Progressing  3/25/2025 0214 by Quincy Corral, RN  Outcome: Progressing     Problem: Pain  Goal: Verbalizes/displays adequate comfort level or baseline comfort level  Outcome: Progressing

## 2025-03-27 ENCOUNTER — HOSPITAL ENCOUNTER (INPATIENT)
Age: 70
LOS: 2 days | Discharge: HOSPICE | End: 2025-03-29
Attending: EMERGENCY MEDICINE | Admitting: INTERNAL MEDICINE
Payer: MEDICARE

## 2025-03-27 ENCOUNTER — APPOINTMENT (OUTPATIENT)
Dept: CT IMAGING | Age: 70
End: 2025-03-27
Payer: MEDICARE

## 2025-03-27 ENCOUNTER — APPOINTMENT (OUTPATIENT)
Dept: GENERAL RADIOLOGY | Age: 70
End: 2025-03-27
Payer: MEDICARE

## 2025-03-27 DIAGNOSIS — Z51.5 HOSPICE CARE: ICD-10-CM

## 2025-03-27 DIAGNOSIS — R41.82 ALTERED MENTAL STATUS, UNSPECIFIED ALTERED MENTAL STATUS TYPE: Primary | ICD-10-CM

## 2025-03-27 DIAGNOSIS — N17.9 AKI (ACUTE KIDNEY INJURY): ICD-10-CM

## 2025-03-27 PROBLEM — G93.40 ACUTE ENCEPHALOPATHY: Status: ACTIVE | Noted: 2025-03-27

## 2025-03-27 LAB
ALBUMIN SERPL-MCNC: 2.4 G/DL (ref 3.4–5)
ALBUMIN/GLOB SERPL: 0.5 {RATIO} (ref 1.1–2.2)
ALP SERPL-CCNC: 206 U/L (ref 40–129)
ALT SERPL-CCNC: 108 U/L (ref 10–40)
AMMONIA PLAS-SCNC: <10 UMOL/L (ref 16–60)
ANION GAP SERPL CALCULATED.3IONS-SCNC: 11 MMOL/L (ref 3–16)
AST SERPL-CCNC: 213 U/L (ref 15–37)
BACTERIA FLD AEROBE CULT: NORMAL
BACTERIA URNS QL MICRO: ABNORMAL /HPF
BASE EXCESS BLDV CALC-SCNC: -5.5 MMOL/L (ref -3–3)
BASOPHILS # BLD: 0 K/UL (ref 0–0.2)
BASOPHILS NFR BLD: 0.2 %
BILIRUB SERPL-MCNC: 3.4 MG/DL (ref 0–1)
BILIRUB UR QL STRIP.AUTO: ABNORMAL
BUN SERPL-MCNC: 62 MG/DL (ref 7–20)
CALCIUM SERPL-MCNC: 8.2 MG/DL (ref 8.3–10.6)
CHLORIDE SERPL-SCNC: 103 MMOL/L (ref 99–110)
CLARITY UR: CLEAR
CO2 BLDV-SCNC: 20 MMOL/L
CO2 SERPL-SCNC: 18 MMOL/L (ref 21–32)
COHGB MFR BLDV: 1.7 % (ref 0–1.5)
COLOR UR: ABNORMAL
CREAT SERPL-MCNC: 1.7 MG/DL (ref 0.8–1.3)
DEPRECATED RDW RBC AUTO: 16.3 % (ref 12.4–15.4)
EOSINOPHIL # BLD: 0 K/UL (ref 0–0.6)
EOSINOPHIL NFR BLD: 0 %
EPI CELLS #/AREA URNS HPF: ABNORMAL /HPF (ref 0–5)
FLUAV RNA RESP QL NAA+PROBE: NOT DETECTED
FLUBV RNA RESP QL NAA+PROBE: NOT DETECTED
GFR SERPLBLD CREATININE-BSD FMLA CKD-EPI: 43 ML/MIN/{1.73_M2}
GLUCOSE SERPL-MCNC: 105 MG/DL (ref 70–99)
GLUCOSE UR STRIP.AUTO-MCNC: NEGATIVE MG/DL
GRAM STN SPEC: NORMAL
HCO3 BLDV-SCNC: 19 MMOL/L (ref 23–29)
HCT VFR BLD AUTO: 42.9 % (ref 40.5–52.5)
HGB BLD-MCNC: 14.4 G/DL (ref 13.5–17.5)
HGB UR QL STRIP.AUTO: NEGATIVE
HYALINE CASTS #/AREA URNS LPF: ABNORMAL /LPF (ref 0–2)
INR PPP: 1.5 (ref 0.85–1.15)
KETONES UR STRIP.AUTO-MCNC: ABNORMAL MG/DL
LEUKOCYTE ESTERASE UR QL STRIP.AUTO: NEGATIVE
LYMPHOCYTES # BLD: 0.3 K/UL (ref 1–5.1)
LYMPHOCYTES NFR BLD: 1.9 %
MCH RBC QN AUTO: 34.6 PG (ref 26–34)
MCHC RBC AUTO-ENTMCNC: 33.5 G/DL (ref 31–36)
MCV RBC AUTO: 103.2 FL (ref 80–100)
METHGB MFR BLDV: 0.1 %
MONOCYTES # BLD: 0.8 K/UL (ref 0–1.3)
MONOCYTES NFR BLD: 4.7 %
MUCOUS THREADS #/AREA URNS LPF: ABNORMAL /LPF
NEUTROPHILS # BLD: 15.7 K/UL (ref 1.7–7.7)
NEUTROPHILS NFR BLD: 93.2 %
NITRITE UR QL STRIP.AUTO: POSITIVE
O2 CT VFR BLDV CALC: 13 VOL %
O2 THERAPY: ABNORMAL
PCO2 BLDV: 34.8 MMHG (ref 40–50)
PH BLDV: 7.36 [PH] (ref 7.35–7.45)
PH UR STRIP.AUTO: 6 [PH] (ref 5–8)
PLATELET # BLD AUTO: 75 K/UL (ref 135–450)
PLATELET BLD QL SMEAR: ABNORMAL
PMV BLD AUTO: 10.7 FL (ref 5–10.5)
PO2 BLDV: 34.9 MMHG (ref 25–40)
POTASSIUM SERPL-SCNC: 4.7 MMOL/L (ref 3.5–5.1)
PROCALCITONIN SERPL IA-MCNC: 0.75 NG/ML (ref 0–0.15)
PROT SERPL-MCNC: 7 G/DL (ref 6.4–8.2)
PROT UR STRIP.AUTO-MCNC: ABNORMAL MG/DL
PROTHROMBIN TIME: 18.3 SEC (ref 11.9–14.9)
RBC # BLD AUTO: 4.16 M/UL (ref 4.2–5.9)
RBC #/AREA URNS HPF: ABNORMAL /HPF (ref 0–4)
RENAL EPI CELLS #/AREA UR COMP ASSIST: ABNORMAL /HPF (ref 0–1)
SAO2 % BLDV: 65 %
SARS-COV-2 RNA RESP QL NAA+PROBE: NOT DETECTED
SLIDE REVIEW: ABNORMAL
SODIUM SERPL-SCNC: 132 MMOL/L (ref 136–145)
SP GR UR STRIP.AUTO: 1.02 (ref 1–1.03)
UA COMPLETE W REFLEX CULTURE PNL UR: ABNORMAL
UA DIPSTICK W REFLEX MICRO PNL UR: YES
URN SPEC COLLECT METH UR: ABNORMAL
UROBILINOGEN UR STRIP-ACNC: 2 E.U./DL
WBC # BLD AUTO: 16.9 K/UL (ref 4–11)
WBC #/AREA URNS HPF: ABNORMAL /HPF (ref 0–5)

## 2025-03-27 PROCEDURE — 82140 ASSAY OF AMMONIA: CPT

## 2025-03-27 PROCEDURE — 81001 URINALYSIS AUTO W/SCOPE: CPT

## 2025-03-27 PROCEDURE — 80053 COMPREHEN METABOLIC PANEL: CPT

## 2025-03-27 PROCEDURE — 2580000003 HC RX 258: Performed by: NURSE PRACTITIONER

## 2025-03-27 PROCEDURE — 85025 COMPLETE CBC W/AUTO DIFF WBC: CPT

## 2025-03-27 PROCEDURE — 1200000000 HC SEMI PRIVATE

## 2025-03-27 PROCEDURE — 87636 SARSCOV2 & INF A&B AMP PRB: CPT

## 2025-03-27 PROCEDURE — 70450 CT HEAD/BRAIN W/O DYE: CPT

## 2025-03-27 PROCEDURE — 85610 PROTHROMBIN TIME: CPT

## 2025-03-27 PROCEDURE — 93005 ELECTROCARDIOGRAM TRACING: CPT | Performed by: EMERGENCY MEDICINE

## 2025-03-27 PROCEDURE — 84145 PROCALCITONIN (PCT): CPT

## 2025-03-27 PROCEDURE — 82077 ASSAY SPEC XCP UR&BREATH IA: CPT

## 2025-03-27 PROCEDURE — 82803 BLOOD GASES ANY COMBINATION: CPT

## 2025-03-27 PROCEDURE — 99285 EMERGENCY DEPT VISIT HI MDM: CPT

## 2025-03-27 PROCEDURE — 71045 X-RAY EXAM CHEST 1 VIEW: CPT

## 2025-03-27 RX ORDER — PANTOPRAZOLE SODIUM 40 MG/1
40 TABLET, DELAYED RELEASE ORAL
Status: DISCONTINUED | OUTPATIENT
Start: 2025-03-28 | End: 2025-03-29 | Stop reason: HOSPADM

## 2025-03-27 RX ORDER — SODIUM BICARBONATE 650 MG/1
1300 TABLET ORAL ONCE
Status: COMPLETED | OUTPATIENT
Start: 2025-03-27 | End: 2025-03-28

## 2025-03-27 RX ORDER — SODIUM CHLORIDE 0.9 % (FLUSH) 0.9 %
10 SYRINGE (ML) INJECTION EVERY 12 HOURS SCHEDULED
Status: DISCONTINUED | OUTPATIENT
Start: 2025-03-27 | End: 2025-03-29 | Stop reason: HOSPADM

## 2025-03-27 RX ORDER — DICYCLOMINE HCL 20 MG
20 TABLET ORAL
Status: DISCONTINUED | OUTPATIENT
Start: 2025-03-27 | End: 2025-03-29 | Stop reason: HOSPADM

## 2025-03-27 RX ORDER — PROMETHAZINE HYDROCHLORIDE 25 MG/1
12.5 TABLET ORAL EVERY 6 HOURS PRN
Status: DISCONTINUED | OUTPATIENT
Start: 2025-03-27 | End: 2025-03-29 | Stop reason: HOSPADM

## 2025-03-27 RX ORDER — LANOLIN ALCOHOL/MO/W.PET/CERES
100 CREAM (GRAM) TOPICAL DAILY
Status: DISCONTINUED | OUTPATIENT
Start: 2025-03-28 | End: 2025-03-29 | Stop reason: HOSPADM

## 2025-03-27 RX ORDER — ACETAMINOPHEN 325 MG/1
650 TABLET ORAL EVERY 6 HOURS PRN
Status: DISCONTINUED | OUTPATIENT
Start: 2025-03-27 | End: 2025-03-29 | Stop reason: HOSPADM

## 2025-03-27 RX ORDER — FINASTERIDE 5 MG/1
5 TABLET, FILM COATED ORAL DAILY
Status: DISCONTINUED | OUTPATIENT
Start: 2025-03-28 | End: 2025-03-29 | Stop reason: HOSPADM

## 2025-03-27 RX ORDER — SODIUM CHLORIDE 9 MG/ML
INJECTION, SOLUTION INTRAVENOUS PRN
Status: DISCONTINUED | OUTPATIENT
Start: 2025-03-27 | End: 2025-03-29 | Stop reason: HOSPADM

## 2025-03-27 RX ORDER — SODIUM CHLORIDE 0.9 % (FLUSH) 0.9 %
10 SYRINGE (ML) INJECTION PRN
Status: DISCONTINUED | OUTPATIENT
Start: 2025-03-27 | End: 2025-03-29 | Stop reason: HOSPADM

## 2025-03-27 RX ORDER — NICOTINE 21 MG/24HR
1 PATCH, TRANSDERMAL 24 HOURS TRANSDERMAL DAILY PRN
Status: DISCONTINUED | OUTPATIENT
Start: 2025-03-27 | End: 2025-03-29 | Stop reason: HOSPADM

## 2025-03-27 RX ORDER — ONDANSETRON 2 MG/ML
4 INJECTION INTRAMUSCULAR; INTRAVENOUS EVERY 6 HOURS PRN
Status: DISCONTINUED | OUTPATIENT
Start: 2025-03-27 | End: 2025-03-29 | Stop reason: HOSPADM

## 2025-03-27 RX ORDER — ACETAMINOPHEN 650 MG/1
650 SUPPOSITORY RECTAL EVERY 6 HOURS PRN
Status: DISCONTINUED | OUTPATIENT
Start: 2025-03-27 | End: 2025-03-29 | Stop reason: HOSPADM

## 2025-03-27 RX ORDER — MIDODRINE HYDROCHLORIDE 5 MG/1
2.5 TABLET ORAL
Status: DISCONTINUED | OUTPATIENT
Start: 2025-03-28 | End: 2025-03-29 | Stop reason: HOSPADM

## 2025-03-27 RX ORDER — SPIRONOLACTONE 25 MG/1
50 TABLET ORAL DAILY
Status: DISCONTINUED | OUTPATIENT
Start: 2025-03-28 | End: 2025-03-29 | Stop reason: HOSPADM

## 2025-03-27 RX ORDER — LACTULOSE 10 G/15ML
20 SOLUTION ORAL 3 TIMES DAILY
Status: DISCONTINUED | OUTPATIENT
Start: 2025-03-27 | End: 2025-03-29 | Stop reason: HOSPADM

## 2025-03-27 RX ORDER — 0.9 % SODIUM CHLORIDE 0.9 %
1000 INTRAVENOUS SOLUTION INTRAVENOUS ONCE
Status: COMPLETED | OUTPATIENT
Start: 2025-03-27 | End: 2025-03-27

## 2025-03-27 RX ORDER — METOPROLOL SUCCINATE 25 MG/1
25 TABLET, EXTENDED RELEASE ORAL NIGHTLY
Status: DISCONTINUED | OUTPATIENT
Start: 2025-03-27 | End: 2025-03-29 | Stop reason: HOSPADM

## 2025-03-27 RX ADMIN — SODIUM CHLORIDE 1000 ML: 0.9 INJECTION, SOLUTION INTRAVENOUS at 20:30

## 2025-03-27 ASSESSMENT — PAIN DESCRIPTION - FREQUENCY: FREQUENCY: INTERMITTENT

## 2025-03-27 ASSESSMENT — PAIN DESCRIPTION - PAIN TYPE: TYPE: ACUTE PAIN

## 2025-03-27 ASSESSMENT — PAIN DESCRIPTION - DESCRIPTORS: DESCRIPTORS: ACHING

## 2025-03-27 ASSESSMENT — PAIN SCALES - GENERAL
PAINLEVEL_OUTOF10: 2
PAINLEVEL_OUTOF10: 2

## 2025-03-27 ASSESSMENT — PAIN - FUNCTIONAL ASSESSMENT
PAIN_FUNCTIONAL_ASSESSMENT: NONE - DENIES PAIN
PAIN_FUNCTIONAL_ASSESSMENT: ACTIVITIES ARE NOT PREVENTED

## 2025-03-27 ASSESSMENT — PAIN DESCRIPTION - ORIENTATION: ORIENTATION: LOWER;MID

## 2025-03-27 ASSESSMENT — PAIN DESCRIPTION - LOCATION: LOCATION: ABDOMEN

## 2025-03-27 NOTE — ED PROVIDER NOTES
[address] MEDICAL-SURGICAL  EMERGENCY DEPARTMENT ENCOUNTER        Pt Name: Enoc Mancia  MRN: [id number]  Birthdate 1955  Date of evaluation: 3/27/2025  Provider: SATISH Doss CNP  PCP: Sofia Whitt APRN - CNP  Note Started: 6:08 PM EDT 3/27/25      SHELBY. I have evaluated this patient.        CHIEF COMPLAINT       Chief Complaint   Patient presents with    Abnormal Lab     Pt from [facility]; ammonia levels high;        HISTORY OF PRESENT ILLNESS: 1 or more Elements     History From: Patient     Limitations to history : None    Social Determinants Significantly Affecting Health : None    Chief Complaint: Abnormal labs    Enoc Mancia is a 69 y.o. male who presents to the emergency department today with concerns for abnormal lab levels.  Patient has a history of hepatic encephalopathy, elevated ammonia levels, cirrhosis, recent GI bleeding and NASRA.  Patient recently admitted 3- and was released 3 - 25 - 25.  Patient has been receiving lactulose.  He is a resident at [facility] and he was sent into the emerge department for concerning ammonia levels that are high.  He denies any chest pain or abdominal pain.  He is able to answer some questions correctly but others incorrectly.  He seems mildly confused.    Nursing Notes were all reviewed and agreed with or any disagreements were addressed in the HPI.    REVIEW OF SYSTEMS :      Review of Systems    Positives and Pertinent negatives as per HPI.     SURGICAL HISTORY     Past Surgical History:   Procedure Laterality Date    APPENDECTOMY      CARDIAC CATHETERIZATION      COLONOSCOPY  7/25/2013    polyps    COLONOSCOPY  01/18/2021    COLONOSCOPY N/A 1/18/2021    COLON W/ANES. (9:00) **FIBROSCAN TOO** performed by Valeriy Talley MD at [facility] ENDOSCOPY    CYSTOSCOPY      IR INSERT TUNNELED PLEURA CATH W CUFF  3/28/2025    IR INSERT TUNNELED PLEURA CATH W CUFF 3/28/2025 [facility] SPECIAL PROCEDURES    OTHER SURGICAL  tablet by mouth dailyHistorical Med             ALLERGIES     Codeine and Pcn [penicillins]    FAMILYHISTORY       Family History   Problem Relation Age of Onset    Cancer Mother         lung    Stroke Mother         at age 21    Diabetes Mother     Heart Disease Father     High Blood Pressure Father         SOCIAL HISTORY       Social History     Tobacco Use    Smoking status: Former     Current packs/day: 0.00     Average packs/day: 2.0 packs/day for 15.0 years (30.0 ttl pk-yrs)     Types: Cigarettes     Start date: 1978     Quit date: 1993     Years since quittin.7    Smokeless tobacco: Current     Types: Snuff    Tobacco comments:     chews daily   Vaping Use    Vaping status: Never Used   Substance Use Topics    Alcohol use: Not Currently     Alcohol/week: 30.0 - 35.0 standard drinks of alcohol     Types: 30 - 35 Cans of beer per week     Comment: PER WIFE    Drug use: Yes     Types: Marijuana (Weed)     Comment: daily       SCREENINGS     NIHSS: NIH Stroke Scale  NIH Stroke Scale Assessed: No   GCS: [place] Coma Scale  Eye Opening: Spontaneous  Best Verbal Response: Confused  Best Motor Response: Obeys commands  [place] Coma Scale Score: 14    Heart Score      PECARN Last:       CIWA: CIWA Assessment  BP: 115/66  Pulse: 78  COW Score: No data recorded   CURB 65 Last:     PORT Score:     WELL Criteria:     PERC Score:     CURB 65:      PHYSICAL EXAM  1 or more Elements     ED Triage Vitals   BP Systolic BP Percentile Diastolic BP Percentile Temp Temp src Pulse Respirations SpO2   25 1755 -- -- 25 1755 -- 25 1751 25 1751 25 1751   115/70   97 °F (36.1 °C)  88 18 94 %      Height Weight - Scale         25 1751 25 1751         1.778 m (5' 10\") 73.5 kg (162 lb)             Physical Exam  Vitals and nursing note reviewed.   Constitutional:       Appearance: He is ill-appearing. He is not toxic-appearing or diaphoretic.   HENT:      Head: Normocephalic and  components within normal limits   BLOOD GAS, VENOUS - Abnormal; Notable for the following components:    pCO2, Naif 34.8 (*)     HCO3, Venous 19.0 (*)     Base Excess, Naif -5.5 (*)     Carboxyhemoglobin 1.7 (*)     All other components within normal limits   URINALYSIS WITH REFLEX TO CULTURE - Abnormal; Notable for the following components:    Bilirubin, Urine MODERATE (*)     Ketones, Urine TRACE (*)     Protein, UA TRACE (*)     Urobilinogen, Urine 2.0 (*)     Nitrite, Urine POSITIVE (*)     All other components within normal limits   PROTIME-INR - Abnormal; Notable for the following components:    Protime 18.3 (*)     INR 1.50 (*)     All other components within normal limits   AMMONIA - Abnormal; Notable for the following components:    Ammonia <10 (*)     All other components within normal limits   MICROSCOPIC URINALYSIS - Abnormal; Notable for the following components:    Hyaline Casts, UA 21-40 (*)     Mucus, UA 1+ (*)     Renal Epithelial, UA 6-10 (*)     Bacteria, UA 1+ (*)     All other components within normal limits   PROCALCITONIN - Abnormal; Notable for the following components:    Procalcitonin 0.75 (*)     All other components within normal limits   COMPREHENSIVE METABOLIC PANEL W/ REFLEX TO MG FOR LOW K - Abnormal; Notable for the following components:    Sodium 134 (*)     CO2 18 (*)     BUN 69 (*)     Creatinine 1.5 (*)     Est, Glom Filt Rate 50 (*)     Albumin 2.2 (*)     Albumin/Globulin Ratio 0.5 (*)     Total Bilirubin 3.1 (*)     Alkaline Phosphatase 185 (*)     ALT 96 (*)      (*)     All other components within normal limits   CBC WITH AUTO DIFFERENTIAL - Abnormal; Notable for the following components:    WBC 13.7 (*)     RBC 4.06 (*)     .8 (*)     MCH 34.9 (*)     RDW 15.5 (*)     Platelets 69 (*)     MPV 10.7 (*)     Neutrophils Absolute 12.8 (*)     Lymphocytes Absolute 0.3 (*)     All other components within normal limits   COMPREHENSIVE METABOLIC PANEL W/ REFLEX TO MG  medications on file              (Please note that portions of this note were completed with a voice recognition program.  Efforts were made to edit the dictations but occasionally words are mis-transcribed.)    SATISH Doss CNP (electronically signed)

## 2025-03-28 ENCOUNTER — APPOINTMENT (OUTPATIENT)
Dept: INTERVENTIONAL RADIOLOGY/VASCULAR | Age: 70
End: 2025-03-28
Payer: MEDICARE

## 2025-03-28 ENCOUNTER — TELEPHONE (OUTPATIENT)
Dept: FAMILY MEDICINE CLINIC | Age: 70
End: 2025-03-28

## 2025-03-28 PROBLEM — R79.89 ELEVATED SERUM CREATININE: Status: ACTIVE | Noted: 2025-03-28

## 2025-03-28 PROBLEM — K70.31 ASCITES DUE TO ALCOHOLIC CIRRHOSIS (HCC): Status: ACTIVE | Noted: 2025-03-28

## 2025-03-28 LAB
ALBUMIN SERPL-MCNC: 2.2 G/DL (ref 3.4–5)
ALBUMIN/GLOB SERPL: 0.5 {RATIO} (ref 1.1–2.2)
ALP SERPL-CCNC: 185 U/L (ref 40–129)
ALT SERPL-CCNC: 96 U/L (ref 10–40)
ANION GAP SERPL CALCULATED.3IONS-SCNC: 9 MMOL/L (ref 3–16)
AST SERPL-CCNC: 177 U/L (ref 15–37)
BASOPHILS # BLD: 0 K/UL (ref 0–0.2)
BASOPHILS NFR BLD: 0.3 %
BILIRUB SERPL-MCNC: 3.1 MG/DL (ref 0–1)
BUN SERPL-MCNC: 69 MG/DL (ref 7–20)
CALCIUM SERPL-MCNC: 8.8 MG/DL (ref 8.3–10.6)
CHLORIDE SERPL-SCNC: 107 MMOL/L (ref 99–110)
CO2 SERPL-SCNC: 18 MMOL/L (ref 21–32)
CREAT SERPL-MCNC: 1.5 MG/DL (ref 0.8–1.3)
DEPRECATED RDW RBC AUTO: 15.5 % (ref 12.4–15.4)
EKG ATRIAL RATE: 88 BPM
EKG DIAGNOSIS: NORMAL
EKG P AXIS: 24 DEGREES
EKG P-R INTERVAL: 162 MS
EKG Q-T INTERVAL: 384 MS
EKG QRS DURATION: 86 MS
EKG QTC CALCULATION (BAZETT): 464 MS
EKG R AXIS: 27 DEGREES
EKG T AXIS: 47 DEGREES
EKG VENTRICULAR RATE: 88 BPM
EOSINOPHIL # BLD: 0 K/UL (ref 0–0.6)
EOSINOPHIL NFR BLD: 0.1 %
ETHANOLAMINE SERPL-MCNC: NORMAL MG/DL (ref 0–0.08)
GFR SERPLBLD CREATININE-BSD FMLA CKD-EPI: 50 ML/MIN/{1.73_M2}
GLUCOSE BLD-MCNC: 94 MG/DL (ref 70–99)
GLUCOSE SERPL-MCNC: 86 MG/DL (ref 70–99)
HCT VFR BLD AUTO: 41.8 % (ref 40.5–52.5)
HGB BLD-MCNC: 14.2 G/DL (ref 13.5–17.5)
LYMPHOCYTES # BLD: 0.3 K/UL (ref 1–5.1)
LYMPHOCYTES NFR BLD: 2.2 %
MCH RBC QN AUTO: 34.9 PG (ref 26–34)
MCHC RBC AUTO-ENTMCNC: 33.9 G/DL (ref 31–36)
MCV RBC AUTO: 102.8 FL (ref 80–100)
MONOCYTES # BLD: 0.6 K/UL (ref 0–1.3)
MONOCYTES NFR BLD: 4.4 %
NEUTROPHILS # BLD: 12.8 K/UL (ref 1.7–7.7)
NEUTROPHILS NFR BLD: 93 %
PERFORMED ON: NORMAL
PLATELET # BLD AUTO: 69 K/UL (ref 135–450)
PMV BLD AUTO: 10.7 FL (ref 5–10.5)
POTASSIUM SERPL-SCNC: 4.5 MMOL/L (ref 3.5–5.1)
PROT SERPL-MCNC: 6.9 G/DL (ref 6.4–8.2)
RBC # BLD AUTO: 4.06 M/UL (ref 4.2–5.9)
SODIUM SERPL-SCNC: 134 MMOL/L (ref 136–145)
WBC # BLD AUTO: 13.7 K/UL (ref 4–11)

## 2025-03-28 PROCEDURE — 0WHG33Z INSERTION OF INFUSION DEVICE INTO PERITONEAL CAVITY, PERCUTANEOUS APPROACH: ICD-10-PCS | Performed by: STUDENT IN AN ORGANIZED HEALTH CARE EDUCATION/TRAINING PROGRAM

## 2025-03-28 PROCEDURE — 2709999900 IR US GUIDED PARACENTESIS

## 2025-03-28 PROCEDURE — 95816 EEG AWAKE AND DROWSY: CPT | Performed by: PSYCHIATRY & NEUROLOGY

## 2025-03-28 PROCEDURE — 95816 EEG AWAKE AND DROWSY: CPT

## 2025-03-28 PROCEDURE — 80053 COMPREHEN METABOLIC PANEL: CPT

## 2025-03-28 PROCEDURE — 0W9G30Z DRAINAGE OF PERITONEAL CAVITY WITH DRAINAGE DEVICE, PERCUTANEOUS APPROACH: ICD-10-PCS | Performed by: STUDENT IN AN ORGANIZED HEALTH CARE EDUCATION/TRAINING PROGRAM

## 2025-03-28 PROCEDURE — 49083 ABD PARACENTESIS W/IMAGING: CPT

## 2025-03-28 PROCEDURE — 93010 ELECTROCARDIOGRAM REPORT: CPT | Performed by: INTERNAL MEDICINE

## 2025-03-28 PROCEDURE — 6360000002 HC RX W HCPCS: Performed by: INTERNAL MEDICINE

## 2025-03-28 PROCEDURE — 32550 INSERT PLEURAL CATH: CPT

## 2025-03-28 PROCEDURE — 36415 COLL VENOUS BLD VENIPUNCTURE: CPT

## 2025-03-28 PROCEDURE — 6370000000 HC RX 637 (ALT 250 FOR IP): Performed by: INTERNAL MEDICINE

## 2025-03-28 PROCEDURE — C1729 CATH, DRAINAGE: HCPCS

## 2025-03-28 PROCEDURE — 85025 COMPLETE CBC W/AUTO DIFF WBC: CPT

## 2025-03-28 PROCEDURE — 2580000003 HC RX 258: Performed by: INTERNAL MEDICINE

## 2025-03-28 PROCEDURE — 94761 N-INVAS EAR/PLS OXIMETRY MLT: CPT

## 2025-03-28 PROCEDURE — 1200000000 HC SEMI PRIVATE

## 2025-03-28 PROCEDURE — 75989 ABSCESS DRAINAGE UNDER X-RAY: CPT

## 2025-03-28 PROCEDURE — 99152 MOD SED SAME PHYS/QHP 5/>YRS: CPT

## 2025-03-28 PROCEDURE — 2700000000 HC OXYGEN THERAPY PER DAY

## 2025-03-28 PROCEDURE — 99232 SBSQ HOSP IP/OBS MODERATE 35: CPT | Performed by: NURSE PRACTITIONER

## 2025-03-28 PROCEDURE — 2500000003 HC RX 250 WO HCPCS: Performed by: INTERNAL MEDICINE

## 2025-03-28 PROCEDURE — 6360000002 HC RX W HCPCS: Performed by: STUDENT IN AN ORGANIZED HEALTH CARE EDUCATION/TRAINING PROGRAM

## 2025-03-28 PROCEDURE — 99223 1ST HOSP IP/OBS HIGH 75: CPT | Performed by: PSYCHIATRY & NEUROLOGY

## 2025-03-28 RX ORDER — FENTANYL CITRATE 50 UG/ML
INJECTION, SOLUTION INTRAMUSCULAR; INTRAVENOUS PRN
Status: COMPLETED | OUTPATIENT
Start: 2025-03-28 | End: 2025-03-28

## 2025-03-28 RX ORDER — MIDAZOLAM HYDROCHLORIDE 5 MG/ML
INJECTION, SOLUTION INTRAMUSCULAR; INTRAVENOUS PRN
Status: COMPLETED | OUTPATIENT
Start: 2025-03-28 | End: 2025-03-28

## 2025-03-28 RX ADMIN — FENTANYL CITRATE 25 MCG: 50 INJECTION INTRAMUSCULAR; INTRAVENOUS at 14:37

## 2025-03-28 RX ADMIN — METOPROLOL SUCCINATE 25 MG: 25 TABLET, EXTENDED RELEASE ORAL at 00:14

## 2025-03-28 RX ADMIN — Medication 100 MG: at 09:54

## 2025-03-28 RX ADMIN — SPIRONOLACTONE 50 MG: 25 TABLET ORAL at 09:54

## 2025-03-28 RX ADMIN — DICYCLOMINE HYDROCHLORIDE 20 MG: 20 TABLET ORAL at 09:54

## 2025-03-28 RX ADMIN — DICYCLOMINE HYDROCHLORIDE 20 MG: 20 TABLET ORAL at 00:14

## 2025-03-28 RX ADMIN — DICYCLOMINE HYDROCHLORIDE 20 MG: 20 TABLET ORAL at 20:19

## 2025-03-28 RX ADMIN — METOPROLOL SUCCINATE 25 MG: 25 TABLET, EXTENDED RELEASE ORAL at 20:20

## 2025-03-28 RX ADMIN — SODIUM BICARBONATE 1300 MG: 650 TABLET ORAL at 00:15

## 2025-03-28 RX ADMIN — Medication 10 ML: at 00:15

## 2025-03-28 RX ADMIN — MIDODRINE HYDROCHLORIDE 2.5 MG: 5 TABLET ORAL at 09:54

## 2025-03-28 RX ADMIN — MIDAZOLAM HYDROCHLORIDE 1 MG: 5 INJECTION, SOLUTION INTRAMUSCULAR; INTRAVENOUS at 14:27

## 2025-03-28 RX ADMIN — SODIUM CHLORIDE 1000 MG: 9 INJECTION, SOLUTION INTRAVENOUS at 02:05

## 2025-03-28 RX ADMIN — LACTULOSE 20 G: 10 SOLUTION ORAL at 20:19

## 2025-03-28 RX ADMIN — LACTULOSE 20 G: 10 SOLUTION ORAL at 00:14

## 2025-03-28 RX ADMIN — MIDODRINE HYDROCHLORIDE 2.5 MG: 5 TABLET ORAL at 12:49

## 2025-03-28 RX ADMIN — FINASTERIDE 5 MG: 5 TABLET, FILM COATED ORAL at 09:54

## 2025-03-28 RX ADMIN — FENTANYL CITRATE 50 MCG: 50 INJECTION INTRAMUSCULAR; INTRAVENOUS at 14:27

## 2025-03-28 RX ADMIN — Medication 10 ML: at 20:20

## 2025-03-28 RX ADMIN — PANTOPRAZOLE SODIUM 40 MG: 40 TABLET, DELAYED RELEASE ORAL at 06:24

## 2025-03-28 RX ADMIN — MIDAZOLAM HYDROCHLORIDE 0.5 MG: 5 INJECTION, SOLUTION INTRAMUSCULAR; INTRAVENOUS at 14:37

## 2025-03-28 RX ADMIN — DICYCLOMINE HYDROCHLORIDE 20 MG: 20 TABLET ORAL at 16:43

## 2025-03-28 RX ADMIN — Medication 10 ML: at 09:54

## 2025-03-28 RX ADMIN — LACTULOSE 20 G: 10 SOLUTION ORAL at 09:54

## 2025-03-28 RX ADMIN — MIDODRINE HYDROCHLORIDE 2.5 MG: 5 TABLET ORAL at 16:43

## 2025-03-28 ASSESSMENT — PAIN SCALES - PAIN ASSESSMENT IN ADVANCED DEMENTIA (PAINAD)
BODYLANGUAGE: RELAXED
FACIALEXPRESSION: SMILING OR INEXPRESSIVE
BREATHING: NORMAL
BREATHING: NORMAL
CONSOLABILITY: NO NEED TO CONSOLE
TOTALSCORE: 0
FACIALEXPRESSION: SMILING OR INEXPRESSIVE
BODYLANGUAGE: RELAXED
CONSOLABILITY: NO NEED TO CONSOLE
TOTALSCORE: 0

## 2025-03-28 ASSESSMENT — PAIN SCALES - GENERAL
PAINLEVEL_OUTOF10: 0
PAINLEVEL_OUTOF10: 2
PAINLEVEL_OUTOF10: 0

## 2025-03-28 ASSESSMENT — PAIN - FUNCTIONAL ASSESSMENT
PAIN_FUNCTIONAL_ASSESSMENT: ACTIVITIES ARE NOT PREVENTED
PAIN_FUNCTIONAL_ASSESSMENT: ACTIVITIES ARE NOT PREVENTED

## 2025-03-28 ASSESSMENT — PAIN DESCRIPTION - ORIENTATION
ORIENTATION: MID;LOWER
ORIENTATION: MID;LOWER

## 2025-03-28 ASSESSMENT — PAIN DESCRIPTION - LOCATION
LOCATION: ABDOMEN
LOCATION: ABDOMEN

## 2025-03-28 ASSESSMENT — PAIN DESCRIPTION - DESCRIPTORS
DESCRIPTORS: ACHING
DESCRIPTORS: ACHING

## 2025-03-28 NOTE — PLAN OF CARE
Problem: Discharge Planning  Goal: Discharge to home or other facility with appropriate resources  Outcome: Progressing     Problem: Pain  Goal: Verbalizes/displays adequate comfort level or baseline comfort level  Outcome: Progressing  Flowsheets (Taken 3/27/2025 2300)  Verbalizes/displays adequate comfort level or baseline comfort level:   Encourage patient to monitor pain and request assistance   Assess pain using appropriate pain scale   Administer analgesics based on type and severity of pain and evaluate response   Implement non-pharmacological measures as appropriate and evaluate response     Problem: Safety - Adult  Goal: Free from fall injury  Outcome: Progressing

## 2025-03-28 NOTE — H&P
Hospital Medicine History & Physical      PCP: Sofia Whitt, APRN - CNP    Date of Admission: 3/27/2025    Date of Service: Pt seen/examined on 3/27/2025    Pt seen/examined face to face on and admitted as inpatient with expected LOS to be two days but can change depending on diagnostic work up and treatment response.     Chief Complaint:    Chief Complaint   Patient presents with    Abnormal Lab     Pt from [facility]; ammonia levels high;             ASSESSMENT AND PLAN:    Active Hospital Problems    Diagnosis Date Noted    Acute encephalopathy [G93.40] 03/27/2025     Acute encephalopathy,  Spouse at bedside reported that the patient has been becoming confused for the next few weeks but worsened today and that he was seeing his dead sister  No tremor examination  Still disoriented to time  CT head negative  Ammonia negative  Neurology consulted, appreciated    Acute renal failure,  Nephrology consulted, appreciated    Transaminitis:  AST> ALT  Ethanol level pending    Acute cystitis,  Per spouse patient has been complaining of lower abdominal pain and has been noticing been urinary incontinent being in himself  Bacteria  IV ceftriaxone initiated    Cirrhosis:  Continue spironolactone lactulose  Outpatient follow-up    Acute cough:  COVID flu negative  Chest x-ray showing atelectasis  Incentive spirometry ordered    Essential Hypertension: Reviewed patient's medications and plan is to continue home medication  GERD: Continue home medication      .Due to the above diagnosis makes the patient higher risk for morbidity and mortality requiring testing and treatment      Discussion with the primary ER physician in regards to symptoms, history, physical exam, diagnosis and treatment, collaborative decision was to admit the patient.    Diet: NPO except meds ordered    DVT Prophylaxis: lovenox    Dispo:   Expected LOS of two days      History Of Present Illness:      69 y.o. male who presented to [facility]  new rashes or lesions.  Neurologic: Alert and oriented x2, no new focal sensory/motor deficits.   Did not see any asterixis slurred speech but is disoriented to time no tremors spider nevi present bruising present  Labs:     Recent Labs     03/27/25  1809   WBC 16.9*   HGB 14.4   HCT 42.9   PLT 75*     Recent Labs     03/27/25  1809   *   K 4.7      CO2 18*   BUN 62*   CREATININE 1.7*   CALCIUM 8.2*     Recent Labs     03/27/25  1809   *   *   BILITOT 3.4*   ALKPHOS 206*     Recent Labs     03/27/25  1809   INR 1.50*     No results for input(s): \"CKTOTAL\", \"TROPONINI\" in the last 72 hours.    Urinalysis:      Lab Results   Component Value Date/Time    NITRU POSITIVE 03/27/2025 06:09 PM    WBCUA 3-5 03/27/2025 06:09 PM    BACTERIA 1+ 03/27/2025 06:09 PM    RBCUA 3-4 03/27/2025 06:09 PM    BLOODU Negative 03/27/2025 06:09 PM    SPECGRAV 1.020 06/01/2021 12:13 PM    GLUCOSEU Negative 03/27/2025 06:09 PM       Radiology:     CXR: I have reviewed the CXR with the following interpretation:   Atelectasis  EKG:  I have reviewed the EKG with the following interpretation:   Normal sinus rhythm    .  CT HEAD WO CONTRAST   Final Result   No acute intracranial abnormality.         XR CHEST PORTABLE   Final Result   Curvilinear density involving the right lung base, likely atelectasis.                      Dee Mazariegos DO

## 2025-03-28 NOTE — CARE COORDINATION
Case Management Assessment  Initial Evaluation    Date/Time of Evaluation: 3/28/2025 10:28 AM  Assessment Completed by: Ariela Negron RN    If patient is discharged prior to next notation, then this note serves as note for discharge by case management.    Patient Name: Enoc Mancia                   YOB: 1955  Diagnosis: NASRA (acute kidney injury) [N17.9]  Acute encephalopathy [G93.40]  Altered mental status, unspecified altered mental status type [R41.82]                   Date / Time: 3/27/2025  5:47 PM    Patient Admission Status: Inpatient   Readmission Risk (Low < 19, Mod (19-27), High > 27): Readmission Risk Score: 25.2    Current PCP: Sofia Whitt APRN - CNP  PCP verified by CM? Yes    Chart Reviewed: Yes      History Provided by: Spouse  Patient Orientation: Self    Patient Cognition: Alert    Hospitalization in the last 30 days (Readmission):  Yes    If yes, Readmission Assessment in  Navigator will be completed.    Advance Directives:      Code Status: Full Code   Patient's Primary Decision Maker is: Legal Next of Kin    Primary Decision Maker: Dara Mancia - Spouse - [phone]    Discharge Planning:    Patient lives with: Spouse/Significant Other Type of Home: Skilled Nursing Facility  Primary Care Giver: Spouse  Patient Support Systems include: Spouse/Significant Other   Current Financial resources: Medicare  Current community resources: None  Current services prior to admission: None            Current DME:              Type of Home Care services:  None    ADLS  Prior functional level: Assistance with the following:, Bathing, Dressing, Toileting, Cooking, Housework, Shopping, Mobility  Current functional level: Assistance with the following:, Bathing, Dressing, Toileting, Housework, Cooking, Shopping, Mobility    PT AM-PAC:   /24  OT AM-PAC:   /24    Family can provide assistance at DC: No  Would you like Case Management to discuss the discharge plan with any other

## 2025-03-28 NOTE — PROGRESS NOTES
Occupational Therapy/Physical Therapy    Orders received and chart reviewed.  Pt currently meeting with hospice and then going off floor for procedure per RN.  Will reattempt tomorrow as pt tolerates and schedule allows.    Babita Denise, OTR/L #[id number]  Julius Stewart PT, DPT [id number]

## 2025-03-28 NOTE — BRIEF OP NOTE
Interventional Radiology  Brief Postoperative Note    Patient: Enoc Mancia  YOB: 1955  MRN: [id number]      Pre-operative Diagnosis: Ascites    Post-operative Diagnosis: Same    Procedure: Tunneled peritoneal catheter placement    Anesthesia: Local and Moderate Sedation    Surgeons/Assistants: Ash Israel DO    Estimated Blood Loss: less than 50     Complications: None    Infection Present At Time Of Surgery (PATOS) (choose all levels that have infection present):  No infection present    Specimens: Was Obtained: clear yellow ascites    Findings:   Successful placement of a tunneled peritoneal catheter.  Successful paracentesis via the catheter.       Ash Israel DO  3/28/2025, 2:50 PM  Interventional Radiology  [phone] ([phone])

## 2025-03-28 NOTE — PRE SEDATION
Sedation Pre-Procedure Note    Patient Name: Enoc Mancia   YOB: 1955  Room/Bed: 0228/0228-01  Medical Record Number: [id number]  Date: 3/28/2025   Time: 2:20 PM       Indication:  Recurrent ascites. Tunneled peritoneal catheter placement requested.     Consent: I have discussed with the patient and/or the patient representative the indication, alternatives, and the possible risks and/or complications of the planned procedure and the anesthesia methods. The patient and/or patient representative appear to understand and agree to proceed.    Vital Signs:   Vitals:    03/28/25 1245   BP: 92/75   Pulse: 84   Resp: 18   Temp: 97.8 °F (36.6 °C)   SpO2: 90%       Past Medical History:   has a past medical history of Acid reflux, Arthritis, Cirrhosis (HCC), Depression, Enlarged prostate, Hearing decreased, Hyperlipidemia, Hypertension, Liver disease, Muscle pain, Rheumatoid arthritis (HCC), and Seizure (HCC).    Past Surgical History:   has a past surgical history that includes Appendectomy; Wrist surgery; other surgical history (09/27/2012); Cardiac catheterization; Cystocopy; Upper gastrointestinal endoscopy (7/25/2013); Colonoscopy (7/25/2013); Colonoscopy (01/18/2021); Colonoscopy (N/A, 1/18/2021); and Upper gastrointestinal endoscopy (N/A, 3/18/2025).    Medications:   Scheduled Meds:    cefTRIAXone (ROCEPHIN) IV  1,000 mg IntraVENous Q24H    dicyclomine  20 mg Oral 4x Daily WC    finasteride  5 mg Oral Daily    lactulose  20 g Oral TID    midodrine  2.5 mg Oral TID WC    metoprolol succinate  25 mg Oral Nightly    [Held by provider] spironolactone  50 mg Oral Daily    pantoprazole  40 mg Oral QAM AC    thiamine  100 mg Oral Daily    sodium chloride flush  10 mL IntraVENous 2 times per day     Continuous Infusions:    sodium chloride       PRN Meds: sodium chloride flush, sodium chloride, promethazine **OR** ondansetron, melatonin, nicotine, acetaminophen **OR** acetaminophen  Home Meds:   Prior to  Admission medications    Medication Sig Start Date End Date Taking? Authorizing Provider   lactulose (CHRONULAC) 10 GM/15ML solution Take 30 mLs by mouth 3 times daily 3/23/25  Yes Maria R Benjamin DO   spironolactone (ALDACTONE) 50 MG tablet Take 1 tablet by mouth daily 3/8/25  Yes Maria R Benjamin,    Multiple Vitamin (MULTIVITAMIN) TABS tablet Take 1 tablet by mouth daily 3/8/25  Yes Maria R Benjamin DO   thiamine 100 MG tablet Take 1 tablet by mouth daily 3/8/25  Yes Maria R Benjamin DO   metoprolol succinate (TOPROL XL) 25 MG extended release tablet Take 1 tablet by mouth nightly 1/22/25  Yes Sofia Whitt APRN - CNP   finasteride (PROSCAR) 5 MG tablet Take 1 tablet by mouth daily 1/22/25  Yes Sofia Whitt APRN - CNP   FLUoxetine (PROZAC) 40 MG capsule TAKE TWO CAPSULES BY MOUTH EVERY DAY 12/26/24  Yes Sofia Whitt APRN - CNP   omeprazole (PRILOSEC) 20 MG delayed release capsule TAKE 1 CAPSULE BY MOUTH DAILY 12/26/24  Yes Sofia Whitt APRN - CNP   tamsulosin (FLOMAX) 0.4 MG capsule TAKE ONE CAPSULE BY MOUTH EVERY DAY NIGHTLY 12/23/24  Yes Sofia Whitt APRN - CNP   Cholecalciferol (VITAMIN D HIGH POTENCY PO) Take 500 mcg by mouth daily   Yes Kiera Leung MD   polycarbophil (FIBERCON) 625 MG tablet Take 1 tablet by mouth daily   Yes Kiera Leung MD   midodrine (PROAMATINE) 2.5 MG tablet Take 1 tablet by mouth 3 times daily (with meals) Do not give after 1800 or within 4 hrs of bedtime. 3/23/25   Maria R Benjamin DO   dicyclomine (BENTYL) 20 MG tablet Take 1 tablet by mouth 4 times daily 3/19/25 5/18/25  Sofia Whitt APRN - CNP   Probiotic Product (CULTURELLE PROBIOTICS PO) Take by mouth daily    Kiera Leung MD   vitamin B-12 (CYANOCOBALAMIN) 500 MCG tablet Take 1 tablet by mouth daily    Kiera Leung MD     Coumadin Use Last 7 Days:  no  Antiplatelet drug therapy use last 7 days: no  Other anticoagulant use last 7 days: no  Additional Medication  Information:  N/A      Pre-Sedation Documentation and Exam:   I have reviewed the patient's history and review of systems.    Mallampati Airway Assessment:  normal, Mallampati Class II - (soft palate, fauces & uvula are visible)    Prior History of Anesthesia Complications:   none    ASA Classification:  Class 3 - A patient with severe systemic disease that limits activity but is not incapacitating    Sedation/ Anesthesia Plan:   intravenous sedation    Medications Planned:   midazolam (Versed) intravenously and fentanyl intravenously    Patient is an appropriate candidate for plan of sedation: yes    Electronically signed by Ash Israel DO on 3/28/2025 at 2:20 PM     There are no Wet Read(s) to document.

## 2025-03-28 NOTE — PROGRESS NOTES
Shift report given to Saranya at bedside. Patient is stable. All end of shift needs have been met. No further assistance needed at this time.

## 2025-03-28 NOTE — FLOWSHEET NOTE
03/27/25 2300   Vital Signs   Temp 97.9 °F (36.6 °C)   Temp Source Oral   Pulse 80   Heart Rate Source Monitor   Respirations 18   /64   MAP (Calculated) 82   BP Location Right upper arm   BP Method Automatic   Patient Position Semi carmelowlers   Pain Assessment   Pain Assessment 0-10   Pain Level 2   Patient's Stated Pain Goal 0 - No pain   Pain Location Abdomen   Pain Orientation Lower;Mid   Pain Descriptors Aching   Functional Pain Assessment Activities are not prevented   Pain Type Acute pain   Pain Frequency Intermittent   Non-Pharmaceutical Pain Intervention(s) Rest   Care Plan - Pain Goals   Verbalizes/displays adequate comfort level or baseline comfort level Encourage patient to monitor pain and request assistance;Assess pain using appropriate pain scale;Administer analgesics based on type and severity of pain and evaluate response;Implement non-pharmacological measures as appropriate and evaluate response   Opioid-Induced Sedation   POSS Score 1   RASS   Martini Agitation Sedation Scale (RASS) 0   Oxygen Therapy   SpO2 96 %   O2 Device None (Room air)   Height and Weight   Height 1.778 m (5' 10\")   Weight - Scale 69.3 kg (152 lb 11.2 oz)   Weight Method Bed scale   BSA (Calculated - sq m) 1.85 sq meters   BMI (Calculated) 22     Admission assessment complete.  Patient is alert, disoriented to time.  Vital signs are stable.  Respirations are even and easy, no signs or symptoms of distress.  On telemetry.  CHG wipes done.   Instructed to call when needs assistance. Call light within reach.

## 2025-03-28 NOTE — TELEPHONE ENCOUNTER
Pt is being discharged tomorrow with hospice they wanted to know if you will follow his medication or if you want hospice to follow please       Spoke with wesley she informed me that the provider will have hospice follow the pt

## 2025-03-28 NOTE — OR NURSING
Image guided PleurX catheter insertion right completed. Dr. Israel placed 15.5 Bhut[place]  PleurX catheter LOT # [id number] EXP 09/30/26 in the right.  Drain/tube dressing clean, dry, and intact. Pt tolerated procedure without any signs or symptoms of distress. 2.2 liters pinkish fluid removed. Vital signs stable. Report called to Saranya FOUNTAIN. Pt transported back to [facility] in stable condition via bed by transport.     Medications  Versed: 1.5 mg  Fentanyl: 75 mcg    Vital Signs  Vitals:    03/28/25 1450   BP: 123/66   Pulse: 78   Resp: 16   Temp:    SpO2: 98%    (vital signs in table format)    Post Peter  2 - Able to move 4 extremities voluntarily on command  2 - BP+/- 20mmHg of normal  2 - Fully awake  2 - Able to maintain oxygen saturation >92% on room air  2 - Able to breathe deeply and cough freely

## 2025-03-28 NOTE — PROGRESS NOTES
Consult has been called to rosangela on 3/28/25. Spoke with len. 8:25 AM    Saranya Martinez  3/28/2025

## 2025-03-28 NOTE — ACP (ADVANCE CARE PLANNING)
Advance Care Planning     General Advance Care Planning (ACP) Conversation    Date of Conversation: 3/28/2025  Conducted with: Patient with Decision Making Capacity and Healthcare Decision Maker  Other persons present: none    Healthcare Decision Maker:   Primary Decision Maker: Dara Mancia - [facility] - [phone]       Content/Action Overview:  DECLINED ACP Conversation - will revisit periodically  Reviewed DNR/DNI and patient elects Full Code (Attempt Resuscitation)        Length of Voluntary ACP Conversation in minutes:  <16 minutes (Non-Billable)    Ariela Negron RN

## 2025-03-28 NOTE — PROGRESS NOTES
Progress Note    Admit Date:  3/27/2025    Admitted for AMS  Multiple admissions for recurrent decompensated cirrhosis     Subjective:  Mr. Mancia is resting in bed.  Family at bedside.  Discussing hospice and would like a paracentesis today. Possible PleurX catheter placed.    Objective:   Patient Vitals for the past 4 hrs:   BP Temp Temp src Pulse Resp SpO2   03/28/25 0945 121/73 97.7 °F (36.5 °C) Axillary 78 16 96 %          Intake/Output Summary (Last 24 hours) at 3/28/2025 1121  Last data filed at 3/28/2025 0014  Gross per 24 hour   Intake 1150 ml   Output --   Net 1150 ml       Physical Exam:    Gen: No distress. Alert. Appears ill, +thin, older than stated age, pleasant male  Eyes: PERRL. No sclera icterus. No conjunctival injection.   ENT: No discharge. Pharynx clear.   Neck: No JVD.  Trachea midline.  Resp: No accessory muscle use. No crackles. No wheezes. No rhonchi.   CV: Regular rate. Regular rhythm. No murmur.  No rub. No edema.   Capillary Refill: Brisk,< 3 seconds   GI: ++diffuse TTP. ++distended.  Normal bowel sounds.  Skin: Warm and dry. No nodule on exposed extremities. No rash on exposed extremities.   M/S: No cyanosis. No joint deformity. No clubbing.   Neuro: Awake. Grossly nonfocal    Psych: Oriented x 3. No anxiety or agitation.      Scheduled Meds:   cefTRIAXone (ROCEPHIN) IV  1,000 mg IntraVENous Q24H    dicyclomine  20 mg Oral 4x Daily WC    finasteride  5 mg Oral Daily    lactulose  20 g Oral TID    midodrine  2.5 mg Oral TID WC    metoprolol succinate  25 mg Oral Nightly    spironolactone  50 mg Oral Daily    pantoprazole  40 mg Oral QAM AC    thiamine  100 mg Oral Daily    sodium chloride flush  10 mL IntraVENous 2 times per day       Continuous Infusions:   sodium chloride         PRN Meds:  sodium chloride flush, sodium chloride, promethazine **OR** ondansetron, melatonin, nicotine, acetaminophen **OR** acetaminophen      Data:  CBC:   Recent Labs     03/27/25  1809 03/28/25  0618

## 2025-03-28 NOTE — PLAN OF CARE
Problem: Discharge Planning  Goal: Discharge to home or other facility with appropriate resources  3/28/2025 1109 by Saranya Daigle RN  Outcome: Progressing  3/28/2025 0155 by Enoch Oliveira RN  Outcome: Progressing  Flowsheets (Taken 3/27/2025 2300)  Discharge to home or other facility with appropriate resources: Identify barriers to discharge with patient and caregiver     Problem: Pain  Goal: Verbalizes/displays adequate comfort level or baseline comfort level  3/28/2025 1109 by Saranya Daigle RN  Outcome: Progressing  3/28/2025 0155 by Enoch Oliveira RN  Outcome: Progressing  Flowsheets (Taken 3/27/2025 2300)  Verbalizes/displays adequate comfort level or baseline comfort level:   Encourage patient to monitor pain and request assistance   Assess pain using appropriate pain scale   Administer analgesics based on type and severity of pain and evaluate response   Implement non-pharmacological measures as appropriate and evaluate response     Problem: Safety - Adult  Goal: Free from fall injury  3/28/2025 1109 by Saranya Daigle RN  Outcome: Progressing  3/28/2025 0155 by Enoch Oliveira RN  Outcome: Progressing     Problem: Confusion  Goal: Confusion, delirium, dementia, or psychosis is improved or at baseline  Description: INTERVENTIONS:  1. Assess for possible contributors to thought disturbance, including medications, impaired vision or hearing, underlying metabolic abnormalities, dehydration, psychiatric diagnoses, and notify attending LIP  2. [place] high risk fall precautions, as indicated  3. Provide frequent short contacts to provide reality reorientation, refocusing and direction  4. Decrease environmental stimuli, including noise as appropriate  5. Monitor and intervene to maintain adequate nutrition, hydration, elimination, sleep and activity  6. If unable to ensure safety without constant attention obtain sitter and review sitter guidelines with assigned personnel  7. Initiate  Psychosocial CNS and Spiritual Care consult, as indicated  3/28/2025 1109 by Saranya Daigle, RN  Outcome: Progressing  3/28/2025 0424 by Enoch Oliveira, RN  Outcome: Progressing

## 2025-03-28 NOTE — CONSULTS
Neurology consultation note    Patient name: Enoc Mancia      Chief Complaint:  Encephalopathy.    History of present illness:  This is a 69 years old male who was brought to the hospital yesterday after the patient developed encephalopathy.  The onset was about 2 weeks ago.  It has been slowly worse.  The patient is able to only follow simple commands during my assessment.  There is no clear unilateral weakness.  The patient also exhibits negative myoclonus.  The patient denies significant headache.    Past medical history:    Past Medical History:   Diagnosis Date    Acid reflux     Arthritis     rheumatoid    Cirrhosis (HCC)     Depression     Enlarged prostate     Hearing decreased     does not wear his hearing aid    Hyperlipidemia     no meds    Hypertension     Liver disease     Stage 2 per pt     Muscle pain     Rheumatoid arthritis (HCC)     Seizure (HCC) 01/22/2025       Past surgical history:    Past Surgical History:   Procedure Laterality Date    APPENDECTOMY      CARDIAC CATHETERIZATION      COLONOSCOPY  7/25/2013    polyps    COLONOSCOPY  01/18/2021    COLONOSCOPY N/A 1/18/2021    COLON W/ANES. (9:00) **FIBROSCAN TOO** performed by Valeriy Talley MD at [facility] ENDOSCOPY    CYSTOSCOPY      OTHER SURGICAL HISTORY  09/27/2012    muscle biopsy left shoulder    UPPER GASTROINTESTINAL ENDOSCOPY  7/25/2013    dysphagia    UPPER GASTROINTESTINAL ENDOSCOPY N/A 3/18/2025    ESOPHAGOGASTRODUODENOSCOPY BIOPSY performed by Blaze Rondon DO at [facility] ENDOSCOPY    WRIST SURGERY      left        Medication:    Current Facility-Administered Medications   Medication Dose Route Frequency Provider Last Rate Last Admin    cefTRIAXone (ROCEPHIN) 1,000 mg in sodium chloride 0.9 % 50 mL IVPB (addEASE)  1,000 mg IntraVENous Q24H Dee Mazariegos DO   Stopped at 03/28/25 0242    dicyclomine (BENTYL) tablet 20 mg  20 mg Oral 4x Daily  Dee Mazariegos DO   20 mg at 03/28/25 0954    finasteride (PROSCAR)  tablet 5 mg  5 mg Oral Daily Delilah, Ahmad A, DO   5 mg at 03/28/25 0954    lactulose (CHRONULAC) 10 GM/15ML solution 20 g  20 g Oral TID Delilah, Ahmad A, DO   20 g at 03/28/25 0954    midodrine (PROAMATINE) tablet 2.5 mg  2.5 mg Oral TID WC Delilah, Ahmad A, DO   2.5 mg at 03/28/25 0954    metoprolol succinate (TOPROL XL) extended release tablet 25 mg  25 mg Oral Nightly Delilah, Ahmad A, DO   25 mg at 03/28/25 0014    [Held by provider] spironolactone (ALDACTONE) tablet 50 mg  50 mg Oral Daily Delilah, Ahmad A, DO   50 mg at 03/28/25 0954    pantoprazole (PROTONIX) tablet 40 mg  40 mg Oral QAM AC Delilah, Ahmad A, DO   40 mg at 03/28/25 0624    thiamine tablet 100 mg  100 mg Oral Daily Delilah, Ahmad A, DO   100 mg at 03/28/25 0954    sodium chloride flush 0.9 % injection 10 mL  10 mL IntraVENous 2 times per day Delilah, Ahmad A, DO   10 mL at 03/28/25 0954    sodium chloride flush 0.9 % injection 10 mL  10 mL IntraVENous PRN Delilah, Ahmad A, DO        0.9 % sodium chloride infusion   IntraVENous PRN Delilah, Ahmad A, DO        promethazine (PHENERGAN) tablet 12.5 mg  12.5 mg Oral Q6H PRN Delilah, Ahmad A, DO        Or    ondansetron (ZOFRAN) injection 4 mg  4 mg IntraVENous Q6H PRN Delilah, Ahmad A, DO        melatonin tablet 3 mg  3 mg Oral Nightly PRN Delilah, Ahmad A, DO        nicotine (NICODERM CQ) 21 MG/24HR 1 patch  1 patch TransDERmal Daily PRN Delilah, Ahmad A, DO        acetaminophen (TYLENOL) tablet 650 mg  650 mg Oral Q6H PRN Delilah, Ahmad A, DO        Or    acetaminophen (TYLENOL) suppository 650 mg  650 mg Rectal Q6H PRN Delilah, Ahmad A, DO           Allergies:    Allergies as of 03/27/2025 - Fully Reviewed 03/27/2025   Allergen Reaction Noted    Codeine  09/24/2012    Pcn [penicillins]  07/12/2012        Social history:     reports that he quit smoking about 31 years ago. His smoking use included cigarettes. He started smoking about 46 years ago. He has a 30 pack-year smoking history. His smokeless tobacco

## 2025-03-28 NOTE — PROGRESS NOTES
4 Eyes Skin Assessment     NAME:  Enoc Mancia  YOB: 1955  MEDICAL RECORD NUMBER:  [id number]    The patient is being assessed for  Admission    I agree that at least one RN has performed a thorough Head to Toe Skin Assessment on the patient. ALL assessment sites listed below have been assessed.      Areas assessed by both nurses:    Head, Face, Ears, Shoulders, Back, Chest, Arms, Elbows, Hands, Sacrum. Buttock, Coccyx, Ischium, and Legs. Feet and Heels  Sacral blanchable redness        Does the Patient have a Wound? No noted wound(s)       Evan Prevention initiated by RN: No  Wound Care Orders initiated by RN: No    Pressure Injury (Stage 3,4, Unstageable, DTI, NWPT, and Complex wounds) if present, place Wound referral order by RN under : No    New Ostomies, if present place, Ostomy referral order under : No     Nurse 1 eSignature: Electronically signed by Enoch Oliveira RN on 3/28/25 at 6:36 AM EDT    **SHARE this note so that the co-signing nurse can place an eSignature**    Nurse 2 eSignature: Electronically signed by Clemencia Nova RN on 3/28/25 at 7:25 AM EDT

## 2025-03-28 NOTE — PROGRESS NOTES
Per case management:     Pt to DC home with [facility] 3/29. Transportation set up thru [employer].  time 1200 on 3/29. Earline will be covering the weekend with [facility]. Pt will need 3 day scripts for pain, anxiety, nausea, secretion meds.

## 2025-03-28 NOTE — ACP (ADVANCE CARE PLANNING)
[facility]  Palliative Medicine Consultation Note      Date Of Admission:3/27/2025  Date of consult: 03/28/25  Seen by PC in the past:  No    Recommendations:      Introduced palliative care and had a voluntary discussion about advance care planning. Palliative care consultation ordered for a goals of care discussion. Patient is confused and not able to participate in this discussion. Telephone call placed to patient's spouse, Dara. Discussed the patient's diagnosis and prognosis. Dara states that she was at [facility] with the patient and saw his decline there. She feels as though it is time to elect Hospice services and would like a referral sent to [facility].     1. Goals of Care/Advanced Care planning/Code status: Full code  2. Pain: UTO, no objective signs  3. SOB: No objective signs  4. Disposition: Home with [facility]    Reason for Consult:         [x]  Goals of Care  []  Code Status Discussion/Advanced Care Planning   []  Psychosocial/Family Support  []  Symptom Management  []  Other (Specify)    Requesting Physician: Dr. Mazariegos    CHIEF COMPLAINT:  Abnormal labs-elevated ammonia levels    History Obtained From:   spouse, EMR    History of Present Illness:         Enoc Mancia is a 69 y.o. male with PMH of cirrhosis, depression, hypertension, hyperlipidemia  who presented to [facility] ED with confusion and elevated ammonia levels. Patient was recently discharged from the hospital reported after his discharge had some improvement but has been having some confusion and that he has been going on for weeks. The spouse reported today he started talking about his dead sister and then she came here for concern of worsening confusion. Patient knows his name where he is but cannot tell the date otherwise has no current complaints other than lower abdominal pain that is intermittent. No fever chills chest pain shortness of breath. The spouse does report that he has been having  NIGHTLY  Cholecalciferol (VITAMIN D HIGH POTENCY PO), Take 500 mcg by mouth daily  polycarbophil (FIBERCON) 625 MG tablet, Take 1 tablet by mouth daily  midodrine (PROAMATINE) 2.5 MG tablet, Take 1 tablet by mouth 3 times daily (with meals) Do not give after 1800 or within 4 hrs of bedtime.  dicyclomine (BENTYL) 20 MG tablet, Take 1 tablet by mouth 4 times daily  Probiotic Product (CULTURELLE PROBIOTICS PO), Take by mouth daily  vitamin B-12 (CYANOCOBALAMIN) 500 MCG tablet, Take 1 tablet by mouth daily    Allergies:  Codeine and Pcn [penicillins]    Social History:    TOBACCO: reports that he quit smoking about 31 years ago. His smoking use included cigarettes. He started smoking about 46 years ago. He has a 30 pack-year smoking history. His smokeless tobacco use includes snuff.  ETOH:   reports that he does not currently use alcohol after a past usage of about 30.0 - 35.0 standard drinks of alcohol per week.  Patient currently lives with family, recently in SNF for rehab    Review of Systems -   Review of Systems: A 10 point review of systems was conducted, significant findings as notedin HPI. UTO, patient not able to answer questions    Objective:          Physical Exam  Constitutional:       Appearance: He is ill-appearing.   HENT:      Head: Normocephalic and atraumatic.      Nose: Nose normal.      Mouth/Throat:      Mouth: Mucous membranes are moist.      Pharynx: Oropharynx is clear.   Eyes:      Conjunctiva/sclera: Conjunctivae normal.   Cardiovascular:      Rate and Rhythm: Normal rate and regular rhythm.      Pulses: Normal pulses.      Heart sounds: No murmur heard.     No friction rub. No gallop.   Pulmonary:      Effort: Pulmonary effort is normal.      Breath sounds: Normal breath sounds. No wheezing.   Abdominal:      General: Bowel sounds are normal.      Palpations: Abdomen is soft.      Tenderness: There is no guarding.   Musculoskeletal:      Cervical back: Neck supple.      Right lower leg: Edema  present.      Left lower leg: Edema present.   Skin:     General: Skin is warm and dry.      Capillary Refill: Capillary refill takes 2 to 3 seconds.      Coloration: Skin is jaundiced.   Neurological:      Mental Status: He is disoriented.      Motor: Weakness present.          Palliative Performance Scale:  [] 60% Ambulation reduced; Significant disease; Can't do hobbies/housework; intake normal or reduced; occasional assist; LOC full/confusion  [] 50% Mainly sit/lie; Extensive disease; Can't do any work; Considerable assist; intake normal  Or reduced; LOC full/confusion  [] 40% Mainly in bed; Extensive disease; Mainly assist; intake normal or reduced; occasional assist; LOC full/confusion  [x] 30% Bed Bound; Extensive disease; Total care; intake reduced; LOC full/confusion  [] 20% Bed Bound; Extensive disease; Total care; intake minimal; Drowsy/coma  [] 10% Bed Bound; Extensive disease; Total care; Mouth care only; Drowsy/coma  [] 0% Death    PPS:     Vitals:    /73   Pulse 78   Temp 97.7 °F (36.5 °C) (Axillary)   Resp 16   Ht 1.778 m (5' 10\")   Wt 69.3 kg (152 lb 11.2 oz)   SpO2 90%   BMI 21.91 kg/m²     Labs:    BMP:   Recent Labs     03/27/25  1809 03/28/25  0618   * 134*   K 4.7 4.5    107   CO2 18* 18*   BUN 62* 69*   CREATININE 1.7* 1.5*   GLUCOSE 105* 86     CBC:   Recent Labs     03/27/25  1809 03/28/25  0618   WBC 16.9* 13.7*   HGB 14.4 14.2   HCT 42.9 41.8   PLT 75* 69*       LFT's:   Recent Labs     03/27/25  1809 03/28/25  0618   * 177*   * 96*   BILITOT 3.4* 3.1*   ALKPHOS 206* 185*     Troponin: No results for input(s): \"TROPONINI\" in the last 72 hours.  BNP: No results for input(s): \"BNP\" in the last 72 hours.  ABGs: No results for input(s): \"PHART\", \"WSQ6KFU\", \"PO2ART\" in the last 72 hours.  INR:   Recent Labs     03/27/25 1809   INR 1.50*       U/A:  Recent Labs     03/27/25 1809   COLORU DK YELLOW   PHUR 6.0   WBCUA 3-5   RBCUA 3-4   MUCUS 1+*   BACTERIA

## 2025-03-28 NOTE — FLOWSHEET NOTE
03/28/25 0945   Vital Signs   Temp 97.7 °F (36.5 °C)   Temp Source Axillary   Pulse 78   Heart Rate Source Monitor   Respirations 16   /73   MAP (Calculated) 89   BP Location Right upper arm   BP Method Automatic   Patient Position Lying left side   Pain Assessment   Pain Assessment 0-10   Pain Level 0   Pain Assessment in Advanced Dementia (PAINAD)   Breathing Independent of Vocalization 0   Negative Vocalization 0   Facial Expression 0   Body Language 0   Consolability 0   PAINAD Score 0   Opioid-Induced Sedation   POSS Score 1   RASS   Martini Agitation Sedation Scale (RASS) 0   Oxygen Therapy   SpO2 96 %   O2 Device None (Room air)     Alert and oriented to self only.  Skin w/d. Resp ee unlabored. Mepelix to sacrum.  Assisted patient to BSC x2 assist.  Incontinent and continent of B/B.  Brief changed. Tuyet care provided. Bed alarm on.  Meds given. Nrsg asmt completed. See flow sheet and Mar. Call light in easy reach.

## 2025-03-28 NOTE — OR NURSING
Pt arrived for image guided PleurX catheter insertion right . Procedure explained including the risk and benefits of the procedure. All questions answered. Pt verbalizes understanding of the procedure and states no more questions. Consent confirmed. Vital signs stable. Labs, allergies, medications, and code status reviewed. No contraindications noted.     Vital Signs  Vitals:    03/28/25 1245   BP: 92/75   Pulse: 84   Resp: 18   Temp: 97.8 °F (36.6 °C)   SpO2: 90%    (vital signs in table format)    Pre Peter Score  2 - Able to move 4 extremities voluntarily on command  2 - BP+/- 20mmHg of normal  2 - Fully awake  2 - Able to maintain oxygen saturation >92% on room air  2 - Able to breathe deeply and cough freely    Allergies  Codeine and Pcn [penicillins] (allergies)    Labs  Lab Results   Component Value Date    INR 1.50 (H) 03/27/2025    PROTIME 18.3 (H) 03/27/2025     Lab Results   Component Value Date    CREATININE 1.5 (H) 03/28/2025    BUN 69 (H) 03/28/2025     (L) 03/28/2025    K 4.5 03/28/2025     03/28/2025    CO2 18 (L) 03/28/2025     Lab Results   Component Value Date    WBC 13.7 (H) 03/28/2025    HGB 14.2 03/28/2025    HCT 41.8 03/28/2025    .8 (H) 03/28/2025    PLT 69 (L) 03/28/2025

## 2025-03-28 NOTE — PLAN OF CARE
Problem: Discharge Planning  Goal: Discharge to home or other facility with appropriate resources  Outcome: Progressing  Flowsheets (Taken 3/27/2025 2300)  Discharge to home or other facility with appropriate resources: Identify barriers to discharge with patient and caregiver     Problem: Pain  Goal: Verbalizes/displays adequate comfort level or baseline comfort level  Outcome: Progressing  Flowsheets (Taken 3/27/2025 2300)  Verbalizes/displays adequate comfort level or baseline comfort level:   Encourage patient to monitor pain and request assistance   Assess pain using appropriate pain scale   Administer analgesics based on type and severity of pain and evaluate response   Implement non-pharmacological measures as appropriate and evaluate response     Problem: Safety - Adult  Goal: Free from fall injury  Outcome: Progressing     Problem: Confusion  Goal: Confusion, delirium, dementia, or psychosis is improved or at baseline  Description: INTERVENTIONS:  1. Assess for possible contributors to thought disturbance, including medications, impaired vision or hearing, underlying metabolic abnormalities, dehydration, psychiatric diagnoses, and notify attending LIP  2. [place] high risk fall precautions, as indicated  3. Provide frequent short contacts to provide reality reorientation, refocusing and direction  4. Decrease environmental stimuli, including noise as appropriate  5. Monitor and intervene to maintain adequate nutrition, hydration, elimination, sleep and activity  6. If unable to ensure safety without constant attention obtain sitter and review sitter guidelines with assigned personnel  7. Initiate Psychosocial CNS and Spiritual Care consult, as indicated  Outcome: Progressing

## 2025-03-29 VITALS
BODY MASS INDEX: 21.62 KG/M2 | HEIGHT: 70 IN | TEMPERATURE: 97.5 F | HEART RATE: 78 BPM | OXYGEN SATURATION: 100 % | WEIGHT: 151 LBS | DIASTOLIC BLOOD PRESSURE: 66 MMHG | RESPIRATION RATE: 18 BRPM | SYSTOLIC BLOOD PRESSURE: 115 MMHG

## 2025-03-29 LAB
ALBUMIN SERPL-MCNC: 2.1 G/DL (ref 3.4–5)
ALBUMIN/GLOB SERPL: 0.4 {RATIO} (ref 1.1–2.2)
ALP SERPL-CCNC: 183 U/L (ref 40–129)
ALT SERPL-CCNC: 87 U/L (ref 10–40)
ANION GAP SERPL CALCULATED.3IONS-SCNC: 10 MMOL/L (ref 3–16)
AST SERPL-CCNC: 163 U/L (ref 15–37)
BASOPHILS # BLD: 0 K/UL (ref 0–0.2)
BASOPHILS NFR BLD: 0.3 %
BILIRUB SERPL-MCNC: 3.1 MG/DL (ref 0–1)
BUN SERPL-MCNC: 73 MG/DL (ref 7–20)
CALCIUM SERPL-MCNC: 8.8 MG/DL (ref 8.3–10.6)
CHLORIDE SERPL-SCNC: 107 MMOL/L (ref 99–110)
CO2 SERPL-SCNC: 15 MMOL/L (ref 21–32)
CREAT SERPL-MCNC: 1.4 MG/DL (ref 0.8–1.3)
DEPRECATED RDW RBC AUTO: 16.3 % (ref 12.4–15.4)
EOSINOPHIL # BLD: 0 K/UL (ref 0–0.6)
EOSINOPHIL NFR BLD: 0.2 %
GFR SERPLBLD CREATININE-BSD FMLA CKD-EPI: 55 ML/MIN/{1.73_M2}
GLUCOSE SERPL-MCNC: 77 MG/DL (ref 70–99)
HCT VFR BLD AUTO: 45.4 % (ref 40.5–52.5)
HGB BLD-MCNC: 15.1 G/DL (ref 13.5–17.5)
LYMPHOCYTES # BLD: 0.2 K/UL (ref 1–5.1)
LYMPHOCYTES NFR BLD: 2.2 %
MCH RBC QN AUTO: 35 PG (ref 26–34)
MCHC RBC AUTO-ENTMCNC: 33.3 G/DL (ref 31–36)
MCV RBC AUTO: 105.2 FL (ref 80–100)
MONOCYTES # BLD: 0.5 K/UL (ref 0–1.3)
MONOCYTES NFR BLD: 4.9 %
NEUTROPHILS # BLD: 10.2 K/UL (ref 1.7–7.7)
NEUTROPHILS NFR BLD: 92.4 %
PLATELET # BLD AUTO: 59 K/UL (ref 135–450)
PMV BLD AUTO: 10.7 FL (ref 5–10.5)
POTASSIUM SERPL-SCNC: 4.3 MMOL/L (ref 3.5–5.1)
PROT SERPL-MCNC: 7 G/DL (ref 6.4–8.2)
RBC # BLD AUTO: 4.32 M/UL (ref 4.2–5.9)
SODIUM SERPL-SCNC: 132 MMOL/L (ref 136–145)
WBC # BLD AUTO: 11 K/UL (ref 4–11)

## 2025-03-29 PROCEDURE — 94761 N-INVAS EAR/PLS OXIMETRY MLT: CPT

## 2025-03-29 PROCEDURE — 2500000003 HC RX 250 WO HCPCS: Performed by: INTERNAL MEDICINE

## 2025-03-29 PROCEDURE — 6370000000 HC RX 637 (ALT 250 FOR IP): Performed by: INTERNAL MEDICINE

## 2025-03-29 PROCEDURE — 2700000000 HC OXYGEN THERAPY PER DAY

## 2025-03-29 PROCEDURE — 36415 COLL VENOUS BLD VENIPUNCTURE: CPT

## 2025-03-29 PROCEDURE — 6360000002 HC RX W HCPCS: Performed by: INTERNAL MEDICINE

## 2025-03-29 PROCEDURE — 99233 SBSQ HOSP IP/OBS HIGH 50: CPT | Performed by: PSYCHIATRY & NEUROLOGY

## 2025-03-29 PROCEDURE — 2580000003 HC RX 258: Performed by: INTERNAL MEDICINE

## 2025-03-29 PROCEDURE — 85025 COMPLETE CBC W/AUTO DIFF WBC: CPT

## 2025-03-29 PROCEDURE — 80053 COMPREHEN METABOLIC PANEL: CPT

## 2025-03-29 RX ORDER — LORAZEPAM 0.5 MG/1
0.5 TABLET ORAL EVERY 6 HOURS PRN
Qty: 12 TABLET | Refills: 0 | Status: SHIPPED | OUTPATIENT
Start: 2025-03-29 | End: 2025-04-01

## 2025-03-29 RX ORDER — PROMETHAZINE HYDROCHLORIDE 12.5 MG/1
12.5 TABLET ORAL EVERY 6 HOURS PRN
Qty: 12 TABLET | Refills: 0 | Status: SHIPPED | OUTPATIENT
Start: 2025-03-29 | End: 2025-04-01

## 2025-03-29 RX ORDER — ATROPINE SULFATE 0.025 %
DROPS OPHTHALMIC (EYE)
Qty: 5 ML | Refills: 0 | Status: SHIPPED | OUTPATIENT
Start: 2025-03-29 | End: 2025-04-04

## 2025-03-29 RX ORDER — MORPHINE SULFATE 100 MG/5ML
10 SOLUTION ORAL EVERY 4 HOURS PRN
Qty: 30 ML | Refills: 0 | Status: SHIPPED | OUTPATIENT
Start: 2025-03-29 | End: 2025-04-04

## 2025-03-29 RX ADMIN — FINASTERIDE 5 MG: 5 TABLET, FILM COATED ORAL at 10:06

## 2025-03-29 RX ADMIN — MIDODRINE HYDROCHLORIDE 2.5 MG: 5 TABLET ORAL at 10:06

## 2025-03-29 RX ADMIN — SODIUM CHLORIDE 1000 MG: 9 INJECTION, SOLUTION INTRAVENOUS at 01:48

## 2025-03-29 RX ADMIN — LACTULOSE 20 G: 10 SOLUTION ORAL at 10:05

## 2025-03-29 RX ADMIN — PANTOPRAZOLE SODIUM 40 MG: 40 TABLET, DELAYED RELEASE ORAL at 10:06

## 2025-03-29 RX ADMIN — Medication 10 ML: at 10:06

## 2025-03-29 RX ADMIN — Medication 100 MG: at 10:06

## 2025-03-29 RX ADMIN — DICYCLOMINE HYDROCHLORIDE 20 MG: 20 TABLET ORAL at 10:06

## 2025-03-29 NOTE — PLAN OF CARE
Problem: Discharge Planning  Goal: Discharge to home or other facility with appropriate resources  3/28/2025 2307 by Miya Mcbride RN  Outcome: Progressing  3/28/2025 1109 by Saranya Daigle RN  Outcome: Progressing     Problem: Pain  Goal: Verbalizes/displays adequate comfort level or baseline comfort level  3/28/2025 2307 by Miya Mcbride RN  Outcome: Progressing  3/28/2025 1109 by Saranya Daigle RN  Outcome: Progressing     Problem: Safety - Adult  Goal: Free from fall injury  3/28/2025 2307 by Miya Mcbride RN  Outcome: Progressing  3/28/2025 1109 by Saranya Daigle RN  Outcome: Progressing

## 2025-03-29 NOTE — DISCHARGE INSTR - COC
Continuity of Care Form    Patient Name: Enoc Mancia   :  1955  MRN:  [id number]    Admit date:  3/27/2025  Discharge date:  25      Code Status Order: DNR-CC   Advance Directives:     Admitting Physician:  Dee Mazariegos DO  PCP: Sofia Whitt, APRN - CNP    Discharging Nurse: Eric JONES  Discharging Hospital Unit/Room#: 0228/0228-01  Discharging Unit Phone Number: [phone]    Emergency Contact:   Extended Emergency Contact Information  Primary Emergency Contact: Dara Mancia  Address: [address] [place] [address] of [place]  Home Phone: [phone]  Work Phone: [phone]  Mobile Phone: [phone]  Relation: Spouse   needed? No    Past Surgical History:  Past Surgical History:   Procedure Laterality Date    APPENDECTOMY      CARDIAC CATHETERIZATION      COLONOSCOPY  2013    polyps    COLONOSCOPY  2021    COLONOSCOPY N/A 2021    COLON W/ANES. (9:00) **FIBROSCAN TOO** [facility]    CYSTOSCOPY      IR INSERT TUNNELED PLEURA CATH W CUFF  3/28/2025    IR INSERT TUNNELED PLEURA CATH W CUFF 3/28/2025 [facility] SPECIAL PROCEDURES    OTHER SURGICAL HISTORY  2012    muscle biopsy left shoulder    UPPER GASTROINTESTINAL ENDOSCOPY  2013    dysphagia    UPPER GASTROINTESTINAL ENDOSCOPY N/A 3/18/2025    ESOPHAGOGASTRODUODENOSCOPY BIOPSY performed by Blaze Rondon DO at [facility]COPY    WRIST SURGERY      left       Immunization History:   Immunization History   Administered Date(s) Administered    COVID-19, PFIZER GRAY top, DO NOT Dilute, (age 12 y+), IM, 30 mcg/0.3 mL 2022    COVID-19, PFIZER PURPLE top, DILUTE for use, (age 12 y+), 30mcg/0.3mL 2021, 2021    Influenza Virus Vaccine 10/25/2013, 2014, 2015, 2016, 2016, 2017, 2020    Influenza, AFLURIA (age 3 y+), FLUZONE, (age 6 mo+), Quadv MDV, 0.5mL  08/30/2017, 12/01/2020    Influenza, FLUAD, (age 65 y+), IM, Quadv, 0.5mL 11/08/2022, 01/23/2024    Influenza, FLUAD, (age 65 y+), IM, Trivalent PF, 0.5mL 10/29/2024    Influenza, FLUARIX, FLULAVAL, FLUZONE (age 6 mo+) and AFLURIA, (age 3 y+), Quadv PF, 0.5mL 11/30/2018    Influenza, FLUCELVAX, (age 6 mo+), MDCK, Quadv PF, 0.5mL 10/01/2019, 12/06/2021    PPD Test 10/10/2012, 09/09/2014, 01/22/2025    Pneumococcal, PCV20, PREVNAR 20, (age 6w+), IM, 0.5mL 01/16/2023    TDaP, ADACEL (age 10y-64y), BOOSTRIX (age 10y+), IM, 0.5mL 04/18/2018    Tetanus 11/24/2015    Tetanus Toxoid, absorbed 11/24/2015       Active Problems:  Patient Active Problem List   Diagnosis Code    Muscular weakness M62.81    Depression F32.A    Acid reflux K21.9    Hyperlipidemia E78.5    Hypertension I10    Muscle pain M79.10    Hearing decreased H91.90    Arthritis M19.90    Benign prostatic hyperplasia without lower urinary tract symptoms N40.0    Chronic fatigue R53.82    Dizziness R42    Palpitations R00.2    Rheumatoid arthritis (HCC) M06.9    History of tobacco abuse Z87.891    Hx of colonic polyps Z86.0100    Fatty liver K76.0    Chronic liver disease due to alcohol K70.9    Recurrent major depressive disorder, in full remission F33.42    Addiction, marijuana (HCC) F12.20    Alcoholic cirrhosis of liver with ascites (HCC) K70.31    Alcohol abuse F10.10    Pericardial effusion I31.39    NASRA (acute kidney injury) N17.9    Pancytopenia (HCC) D61.818    Hyponatremia E87.1    GI bleed K92.2    Hepatic encephalopathy (HCC) K76.82    Orthostatic hypotension I95.1    Acute encephalopathy G93.40    Elevated serum creatinine R79.89    Ascites due to alcoholic cirrhosis (HCC) K70.31       Isolation/Infection:   Isolation            No Isolation          Patient Infection Status    None to display              Nurse Assessment:  Last Vital Signs: /70   Pulse 77   Temp 97.5 °F (36.4 °C) (Axillary)   Resp 18   Ht 1.778 m (5' 10\")   Wt 68.5 kg

## 2025-03-29 NOTE — FLOWSHEET NOTE
03/28/25 2014   Vital Signs   Temp 97.5 °F (36.4 °C)   Temp Source Axillary   Pulse 78   Heart Rate Source Monitor   Respirations 18   /72   MAP (Calculated) 89   BP Location Left upper arm   Pain Assessment   Pain Assessment None - Denies Pain   Opioid-Induced Sedation   POSS Score 1   Oxygen Therapy   SpO2 95 %   O2 Device Nasal cannula   O2 Flow Rate (L/min) 2 L/min     Pt given evening meds crushed in applesauce. Pt tolerated well. Bed alarm on. No needs voiced. Miya Mcbride RN

## 2025-03-29 NOTE — FLOWSHEET NOTE
03/29/25 0145   Vital Signs   Temp 97.5 °F (36.4 °C)   Temp Source Axillary   Pulse 74   Heart Rate Source Monitor   Respirations 18   /75   MAP (Calculated) 88   BP Location Right upper arm   Pain Assessment   Pain Assessment None - Denies Pain   Oxygen Therapy   SpO2 95 %   O2 Device Nasal cannula   O2 Flow Rate (L/min) 3 L/min     Pt resting in bed, no acute distress. Awake/alert, confused to place/time/situation. Pt keeps removing cont pulse ox sensor and playing with cords. Miya Mcbride, RN

## 2025-03-29 NOTE — PROGRESS NOTES
catheter-peritoneal Pleurx catheter placed by IR on 3/28/2025.  To help with home paracentesis.   This was drainage of ascites fluid band catheter was placed on 3/28/2025  - plan is to discharge home with hospice-to hospice IPU today     -Patient was seen by neurology since admission and EEG showed moderate cerebral dysfunction    Acute Cystitis without hematuria   - urinalysis as above  - urine culture ordered and pending  - started on Rocephin     Elevated Creatinine   -Cr 1.7--.5, baseline 1.2-1.3.  -avoid nephrotoxic agents.  -daily BMP.  - holding aldactone     Hypertension with hypotension  -continue Toprol XL  - holding aldactone  - continue Midodrine   - monitor blood pressure      Hyperlipidemia  -continue statin.      Bilateral hip avascular necrosis  -follow up with PCP      Rheumatoid arthritis.  -follows with rheumatology      BPH.  -on proscar     Hx of Cannabis use.      Plan of care discussed with patient, wife, family at bedside and hospice.  Will order PleurX. Plan is to go home with hospice.  Will await hospice consult for further recommendations.        Note above makes patient higher risk for morbidity and mortality requiring testing and treatment.      DVT Prophylaxis: Lovenox   Diet: ADULT DIET; Regular; 3 carb choices (45 gm/meal); Low Fat/Low Chol/High Fiber/2 gm Na; Low Sodium (2 gm)  Code Status: DNR-CC    Plan of care was discussed with patient and patient's wife at bedside discharge orders in place discharged to hospice inpatient unit today      Total time 40 minutes. > 50%  dominated by counseling and coordination of care.   Signed DNR CC  on chart     Gloria Wilson MD

## 2025-03-29 NOTE — DISCHARGE INSTRUCTIONS
Your information:  Name: Enoc Mancia  : 1955      What to do after you leave the hospital:    Recommended diet: regular diet    Recommended activity: activity as tolerated        The following personal items were collected during your admission and were returned to you:    Belongings  Dental Appliances: None  Vision - Corrective Lenses: Eyeglasses, Sent home  Hearing Aid: Left hearing aid, Right hearing aid, Sent home  Clothing: Socks, Slippers, At bedside  Jewelry: Ring, Other (Comment) (wedding ring)  Electronic Devices: None  Weapons (Notify Protective Services/Security): None  Home Medications: None  Valuables Given To: Family (Comment) (spouse)  Provide Name(s) of Who Valuable(s) Were Given To: Dara    Information obtained by:  By signing below, I understand that if any problems occur once I leave the hospital I am to contact MD.  I understand and acknowledge receipt of the instructions indicated above.

## 2025-03-29 NOTE — PROGRESS NOTES
Pt a/o. Am assessment completed see flow sheet. Pt denies any pain/ needs at this time. Call light within reach. Bed locked in lowest position.

## 2025-03-29 NOTE — PROGRESS NOTES
Neurology Progress Note    ID: Enoc Mancia is a 69 y.o. male    : 1955     LOS: 2 days     ASSESSMENT    Principal Problem:    Acute encephalopathy  Active Problems:    Chronic liver disease due to alcohol    Hepatic encephalopathy (HCC)    Orthostatic hypotension    Elevated serum creatinine    Ascites due to alcoholic cirrhosis (HCC)  Resolved Problems:    * No resolved hospital problems. *    The patient remains encephalopathy with asterixis.  CT brain is unremarkable.  Elevated ammonia level prior to this admission.  Transaminitis and NASRA.     From neurology perspective, this is likely metabolic encephalopathy in the context of NASRA and chronic liver disease.  The patient is low suspicious for CNS infection based on examination and CT brain.    25: The patient remains somnolent.  The EEG showed an moderate encephalopathy.  There is no new focal weakness or numbness today.     Plan:     1.  Continue thiamine.  2.  Maintain bowel movement.  3.  Minimize sedation and anticoagulant medication.  4.  Treatment of infection per primary team.    Neurology will defer MRI brain at this time as there is no clinical suspicion for CVA or CNS infection.  Neurology will follow up the patient again on Monday if the patient remains in the hospital.    Medications:  Scheduled Meds:    cefTRIAXone (ROCEPHIN) IV  1,000 mg IntraVENous Q24H    dicyclomine  20 mg Oral 4x Daily WC    finasteride  5 mg Oral Daily    lactulose  20 g Oral TID    midodrine  2.5 mg Oral TID WC    metoprolol succinate  25 mg Oral Nightly    [Held by provider] spironolactone  50 mg Oral Daily    pantoprazole  40 mg Oral QAM AC    thiamine  100 mg Oral Daily    sodium chloride flush  10 mL IntraVENous 2 times per day     Continuous Infusions:    sodium chloride       PRN Meds: sodium chloride flush, sodium chloride, promethazine **OR** ondansetron, melatonin, nicotine, acetaminophen **OR** acetaminophen    OBJECTIVE  Vital signs in the last 24  hours:  Vitals:    03/29/25 1004   BP: 117/70   Pulse: 77   Resp: 18   Temp: 97.5 °F (36.4 °C)   SpO2: 100%       Intake/Output last 3 shifts:  I/O last 3 completed shifts:  In: 1910 [P.O.:810; IV Piggyback:1100]  Out: -     Intake/Output this shift:  No intake/output data recorded.    Yesterday's Weight:  Wt Readings from Last 1 Encounters:   03/29/25 68.5 kg (151 lb)       SUBJECTIVE  There was no acute event overnight.    ROS:  Negative except in subjective.    Neurological examination:  MENTAL STATUS: Somnolence.  LANG/SPEECH: No dysarthria.  CRANIAL NERVES: No facial asymmetry.  MOTOR: No pronator drift or leg drift.  REFLEXES: Generalized 2+.  SENSORY: Grossly intact.  COORD: No tremor.    Labs and Imaging:  I reviewed labs and brain imaging.    51 minutes were spent with the patient today.    Sparkle Johnson MD

## 2025-03-29 NOTE — CARE COORDINATION
CASE MANAGEMENT DISCHARGE SUMMARY      Discharge to: home with [facility]    IMM given: 3/27/2025      Transportation:      Medical Transport explained to pt/family. Pt/family voice no agency preference.      Agency used: [facility]     time: 1200     Ambulance form completed: Yes    Confirmed discharge plan with:     Patient: yes     Family:  yes     Facility/Agency, name: [facility]     Phone number for report to facility: [phone]      RN name: Eric FOUNTAIN    Note: Discharging nurse to complete BASIM, reconcile AVS, and place final copy with patient's discharge packet. RN to ensure that written prescriptions for  Level II medications are sent with patient to the facility as per protocol.

## 2025-04-03 ENCOUNTER — TELEPHONE (OUTPATIENT)
Dept: FAMILY MEDICINE CLINIC | Age: 70
End: 2025-04-03

## 2025-04-03 NOTE — TELEPHONE ENCOUNTER
Per wife, Enoc passed away last night around 930 pm in his home under hospice.  They are having a celebration of life for him next week.  She will let us know.  Gave her our condolences.

## 2025-04-07 NOTE — DISCHARGE SUMMARY
Name:  Enoc Mancia  Room:  0228/0228-01  MRN:    [id number]    Discharge Summary      This discharge summary is in conjunction with a complete physical exam done on the day of discharge.    Discharging Physician: Dr. Wilson      Admit: 3/27/2025  Discharge:  3/29/2025    HPI:    69 y.o. male who presented to [facility] with past medical history of cirrhosis, depression, hypertension, hyperlipidemia presented to the ED with chief complaint of confusion        Patient was recently discharged from the hospital reported after his discharge had some improvement but has been having some confusion and that he has been going on for weeks.  The spouse reported today he started talking about his dead sister and then she came here for concern of worsening confusion.  Patient knows his name where he is but cannot tell the date otherwise has no current complaints other than lower abdominal pain that is intermittent.  No fever chills chest pain shortness of breath.  The spouse does report that he has been having intermittent cough as well     Diagnoses this Admission and Hospital Course   Acute Metabolic Encephalopathy    Alcoholic cirrhosis with decompensation/ascites.   Hepatic mass, hypodense, has been stable since 2012  Transaminitis  Hx of Hyperammoniemia  Thrombocytopenia   - CT of head showed no acute abnormalities   - ethanol level negative on admission   -s/p paracentesis (3/17) and EGD (3/18).  -ammonia normal  - holding aldactone   - continue lactulose TID  - weekly paracentesis and previous para was 3/24  -  - discussed with family, hospice at bedside as well  - -peritoneal Pleurx catheter placed by IR on 3/28/2025 to help with home paracentesis.   - plan is to discharge to hospice IPU today      -Patient was seen by neurology since admission and EEG showed moderate cerebral dysfunction     Acute Cystitis without hematuria   - urinalysis as above  - urine culture ordered and pending  - started on Rocephin     nonfocal    Psych: Oriented x 3. No anxiety or agitation.              Data:  CBC:         Recent Labs     03/27/25 1809 03/28/25 0618 03/29/25 0727   WBC 16.9* 13.7* 11.0   HGB 14.4 14.2 15.1   HCT 42.9 41.8 45.4   .2* 102.8* 105.2*   PLT 75* 69* 59*      BMP:         Recent Labs     03/27/25 1809 03/28/25 0618 03/29/25 0727   * 134* 132*   K 4.7 4.5 4.3    107 107   CO2 18* 18* 15*   BUN 62* 69* 73*   CREATININE 1.7* 1.5* 1.4*      LIVER PROFILE:         Recent Labs     03/27/25 1809 03/28/25 0618 03/29/25 0727   * 177* 163*   * 96* 87*   BILITOT 3.4* 3.1* 3.1*   ALKPHOS 206* 185* 183*      PT/INR:       Recent Labs     03/27/25 1809   PROTIME 18.3*   INR 1.50*           CULTURES  Results       Procedure Component Value Units Date/Time    COVID-19 & Influenza Combo [[id number]] Collected: 03/27/25 1809    Order Status: Completed Specimen: Nasopharyngeal Swab Updated: 03/27/25 1859     SARS-CoV-2 RNA, RT PCR NOT DETECTED     Comment: Not Detected results do not preclude SARS-CoV-2 infection and  should not be used as the sole basis for patient management  decisions.  Results must be combined with clinical observations,  patient history, and epidemiological information.  Testing was performed using YOGI STEVE SARS-CoV-2, Influenza A/B  and Respiratory Syncytial Virus nucleic acid assay. This  test is a multiplex Real-Time Reverse Transcriptase Polymerase Chain  Reaction (RT-PCR)-based in vitro diagnostic test intended for the  qualitative detection of nucleic acids from SARS-CoV-2,  influenza A,influenza B and Respiratory Syncytial Virus  in nasopharyngeal and nasal swab specimens.    Patient Fact Sheet:  https://www.fda.gov/media/640648/download  Provider Fact Sheet: https://www.fda.gov/media/548804/download  EUA: https://www.fda.gov/media/425612/download  IFU: https://www.fda.gov/media/185263/download    Methodology:  RT-PCR          Influenza A NOT DETECTED     Influenza

## 2025-04-16 NOTE — PROGRESS NOTES
the last 24 hours. He has a history of alcoholism and end-stage   liver disease. History of cirrhosis elevated ammonia levels. (ED Provider   Notes 03/27)  -Acute encephalopathy. CT head negative. (H&P 03/27/2025)  -From neurology perspective, this is likely metabolic encephalopathy in the   context of NASRA and chronic liver disease. (Consults 03/28/2025)  -Alcoholic cirrhosis with decompensation/ascites. (PN 03/28)  -Acute Metabolic Encephalopathy, alcoholic cirrhosis with   decompensation/ascites. (DS 03/29)  -peritoneal Pleurx catheter placed by IR on 3/28/2025 to help with home   paracentesis. (DS 03/29)  Options provided:  -- Related to or associated with each other  -- Unrelated to each other  -- Other - I will add my own diagnosis  -- Disagree - Not applicable / Not valid  -- Disagree - Clinically unable to determine / Unknown  -- Refer to Clinical Documentation Reviewer    PROVIDER RESPONSE TEXT:    The conditions noted are related to or associated with each other.    Query created by: Casio, Ulysses on 4/15/2025 2:53 PM      Electronically signed by:  Gloria Wilson MD 4/15/2025 8:13 PM
